# Patient Record
Sex: FEMALE | Race: WHITE | NOT HISPANIC OR LATINO | Employment: FULL TIME | URBAN - METROPOLITAN AREA
[De-identification: names, ages, dates, MRNs, and addresses within clinical notes are randomized per-mention and may not be internally consistent; named-entity substitution may affect disease eponyms.]

---

## 2017-01-16 ENCOUNTER — APPOINTMENT (OUTPATIENT)
Dept: GENERAL RADIOLOGY | Facility: CLINIC | Age: 47
End: 2017-01-16
Attending: NURSE PRACTITIONER
Payer: COMMERCIAL

## 2017-01-16 ENCOUNTER — HOSPITAL ENCOUNTER (EMERGENCY)
Facility: CLINIC | Age: 47
Discharge: HOME | End: 2017-01-16
Attending: NURSE PRACTITIONER | Admitting: NURSE PRACTITIONER
Payer: COMMERCIAL

## 2017-01-16 VITALS — DIASTOLIC BLOOD PRESSURE: 109 MMHG | SYSTOLIC BLOOD PRESSURE: 173 MMHG | TEMPERATURE: 98.7 F | OXYGEN SATURATION: 99 %

## 2017-01-16 DIAGNOSIS — J20.9 ACUTE BRONCHITIS WITH SYMPTOMS > 10 DAYS: ICD-10-CM

## 2017-01-16 PROCEDURE — 71020 XR CHEST 2 VW: CPT

## 2017-01-16 PROCEDURE — 99213 OFFICE O/P EST LOW 20 MIN: CPT | Performed by: NURSE PRACTITIONER

## 2017-01-16 PROCEDURE — 99213 OFFICE O/P EST LOW 20 MIN: CPT

## 2017-01-16 RX ORDER — PREDNISONE 20 MG/1
TABLET ORAL
Qty: 10 TABLET | Refills: 0 | Status: SHIPPED | OUTPATIENT
Start: 2017-01-16 | End: 2017-01-24

## 2017-01-16 RX ORDER — BENZONATATE 200 MG/1
200 CAPSULE ORAL 3 TIMES DAILY PRN
Qty: 21 CAPSULE | Refills: 0 | Status: SHIPPED | OUTPATIENT
Start: 2017-01-16 | End: 2017-03-24

## 2017-01-16 RX ORDER — AZITHROMYCIN 250 MG/1
TABLET, FILM COATED ORAL
Qty: 6 TABLET | Refills: 0 | Status: SHIPPED | OUTPATIENT
Start: 2017-01-16 | End: 2017-01-21

## 2017-01-16 RX ORDER — ALBUTEROL SULFATE 90 UG/1
2 AEROSOL, METERED RESPIRATORY (INHALATION) EVERY 6 HOURS PRN
Qty: 1 INHALER | Refills: 0 | Status: SHIPPED | OUTPATIENT
Start: 2017-01-16 | End: 2018-04-24

## 2017-01-16 NOTE — DISCHARGE INSTRUCTIONS
Bronchitis, Antibiotic Treatment (Adult)    Bronchitis is an infection of the air passages (bronchial tubes) in your lungs. It often occurs when you have a cold. This illness is contagious during the first few days and is spread through the air by coughing and sneezing, or by direct contact (touching the sick person and then touching your own eyes, nose, or mouth).  Symptoms of bronchitis include cough with mucus (phlegm) and low-grade fever. Bronchitis usually lasts 7 to 14 days. Mild cases can be treated with simple home remedies. More severe infection is treated with an antibiotic.  Home care  Follow these guidelines when caring for yourself at home:    If your symptoms are severe, rest at home for the first 2 to 3 days. When you go back to your usual activities, don't let yourself get too tired.    Do not smoke. Also avoid being exposed to secondhand smoke.    You may use over-the-counter medicines to control fever or pain, unless another medicine was prescribed. (Note: If you have chronic liver or kidney disease or have ever had a stomach ulcer or gastrointestinal bleeding, talk with your healthcare provider before using these medicines. Also talk to your provider if you are taking medicine to prevent blood clots.) Aspirin should never be given to anyone younger than 18 years of age who is ill with a viral infection or fever. It may cause severe liver or brain damage.    Your appetite may be poor, so a light diet is fine. Avoid dehydration by drinking 6 to 8 glasses of fluids per day (such as water, soft drinks, sports drinks, juices, tea, or soup). Extra fluids will help loosen secretions in the nose and lungs.    Over-the-counter cough, cold, and sore-throat medicines will not shorten the length of the illness, but they may be helpful to reduce symptoms. (Note: Do not use decongestants if you have high blood pressure.)    Finish all antibiotic medicine. Do this even if you are feeling better after only a  few days.  Follow-up care  Follow up with your healthcare provider, or as advised. If you had an X-ray or ECG (electrocardiogram), a specialist will review it. You will be notified of any new findings that may affect your care.  Note: If you are age 65 or older, or if you have a chronic lung disease or condition that affects your immune system, or you smoke, talk to your healthcare provider about having pneumococcal vaccinations and a yearly influenza vaccination (flu shot).  When to seek medical advice  Call your healthcare provider right away if any of these occur:    Fever of 100.4 F (38 C) or higher    Coughing up increased amounts of colored sputum    Weakness, drowsiness, headache, facial pain, ear pain, or a stiff neck   Call 911, or get immediate medical care  Contact emergency services right away if any of these occur.    Coughing up blood    Worsening weakness, drowsiness, headache, or stiff neck    Trouble breathing, wheezing, or pain with breathing    4891-4308 [employer]. [address] [place] [address], [place] [address]. All rights reserved. This information is not intended as a substitute for professional medical care. Always follow your healthcare professional's instructions.

## 2017-01-16 NOTE — ED PROVIDER NOTES
History     Chief Complaint   Patient presents with     Cough     has had for 2wks, not getting better, gettign worse today      HPI  Nilsa Vazquez is a 46 year old female with history of htn who presents to urgent care for evaluation of cough and shortness of breath. Cough is non-productive and dry. Symptoms started 2 weeks ago.  No improvement. She has been trying OTC medications. She is a non-smoker.   Patient Active Problem List   Diagnosis     GLOSSOPHAR NERVE DYSFUNCTION     Essential hypertension, benign     Persistent disorder of initiating or maintaining sleep     CARDIOVASCULAR SCREENING; LDL GOAL LESS THAN 160       No current facility-administered medications on file prior to encounter.  Current Outpatient Prescriptions on File Prior to Encounter:  lisinopril (PRINIVIL,ZESTRIL) 20 MG tablet Take 1 tablet (20 mg) by mouth daily   zolpidem (AMBIEN) 5 MG tablet Take 1 tablet (5 mg) by mouth nightly as needed for sleep at bedtime.   diphenhydrAMINE (BENADRYL) 25 MG capsule Take 25 mg by mouth every 6 hours as needed for itching or allergies   IBUPROFEN 200 MG OR TABS 2 tablets twice daily for back pain         I have reviewed the Medications, Allergies, Past Medical and Surgical History, and Social History in the Epic system.    Review of Systems  As mentioned above in the history present illness. All other systems were reviewed and are negative.    Physical Exam   BP: (!) 173/109 mmHg  Heart Rate: 74  Temp: 98.7  F (37.1  C)  SpO2: 99 %  Physical Exam    GENERAL APPEARANCE: healthy, alert and no distress  EYES: EOMI,  PERRL, conjunctiva clear  HENT: ear canals and TM's normal.  Nose and mouth without ulcers, erythema or lesions  NECK: supple, nontender, no lymphadenopathy  RESP: expiratory wheezing on the right, clear on left - no rales, rhonchi or wheezes. Frequent cough noted.   CV: regular rates and rhythm, normal S1 S2, no murmur noted    ED Course   Procedures           Results for orders placed  or performed during the hospital encounter of 01/16/17 (from the past 24 hour(s))   XR Chest 2 Views    Narrative    CHEST TWO VIEWS  1/16/2017 5:43 PM     HISTORY: Cough.    COMPARISON: 3/19/2007      Impression    IMPRESSION: Normal. No change.    TONA CONDON MD       Labs Ordered and Resulted from Time of ED Arrival Up to the Time of Departure from the ED - No data to display    Assessments & Plan (with Medical Decision Making)   DDx:  bronchitis, pneumonia, strep pharyngitis, viral pharyngitis, URI, influenza, sinusitis, otitis media      Nilsa Vazquez is a 46 year old female with history of htn who presents to urgent care for evaluation of cough and shortness of breath. Cough is non-productive and dry. Symptoms started 2 weeks ago.  No improvement. She has been trying OTC medications. She is a non-smoker. Elevated BP-taking meds as prescribed for htn. On exam she has expiratory wheezing on right. Chest xray negative for pneumonia. Patient likely has an acute bronchitis. She will be initiated on antibiotics and steroids. Prescription for prednisone, z-stephenie, albuterol and Tessalon sent to pharmacy.  Worrisome reasons to return discussed.    I have reviewed the nursing notes.    I have reviewed the findings, diagnosis, plan and need for follow up with the patient.    Discharge Medication List as of 1/16/2017  5:56 PM      START taking these medications    Details   predniSONE (DELTASONE) 20 MG tablet Take two tablets (= 40mg) each day for 5 (five) days, Disp-10 tablet, R-0, E-Prescribe      albuterol (PROAIR HFA/PROVENTIL HFA/VENTOLIN HFA) 108 (90 BASE) MCG/ACT Inhaler Inhale 2 puffs into the lungs every 6 hours as needed for shortness of breath / dyspnea or wheezing, Disp-1 Inhaler, R-0, E-Prescribe      azithromycin (ZITHROMAX Z-STEPHENIE) 250 MG tablet Two tablets on the first day, then one tablet daily for the next 4 days, Disp-6 tablet, R-0, E-Prescribe      benzonatate (TESSALON) 200 MG capsule Take 1  capsule (200 mg) by mouth 3 times daily as needed for cough, Disp-21 capsule, R-0, E-Prescribe             Final diagnoses:   Acute bronchitis with symptoms > 10 days       1/16/2017   [facility] EMERGENCY DEPARTMENT      Ivy Gifford APRN CNP  01/16/17 [id number]

## 2017-01-16 NOTE — ED AVS SNAPSHOT
[facility] Emergency Department    [address] [address]    Phone:  [phone]    Fax:  [phone]                                       Nilsa Vazquez   MRN: [id number]    Department:  [facility] Emergency Department   Date of Visit:  1/16/2017           Patient Information     Date Of Birth          1970        Your diagnoses for this visit were:     Acute bronchitis with symptoms > 10 days        You were seen by Ivy Gifford APRN CNP.      Follow-up Information     Follow up with Claudia Mitchell MD.    Specialty:  Family Practice    Why:  As needed    Contact information:    [facility]  [address] [facility] [address]  [phone]          Discharge Instructions         Bronchitis, Antibiotic Treatment (Adult)    Bronchitis is an infection of the air passages (bronchial tubes) in your lungs. It often occurs when you have a cold. This illness is contagious during the first few days and is spread through the air by coughing and sneezing, or by direct contact (touching the sick person and then touching your own eyes, nose, or mouth).  Symptoms of bronchitis include cough with mucus (phlegm) and low-grade fever. Bronchitis usually lasts 7 to 14 days. Mild cases can be treated with simple home remedies. More severe infection is treated with an antibiotic.  Home care  Follow these guidelines when caring for yourself at home:    If your symptoms are severe, rest at home for the first 2 to 3 days. When you go back to your usual activities, don't let yourself get too tired.    Do not smoke. Also avoid being exposed to secondhand smoke.    You may use over-the-counter medicines to control fever or pain, unless another medicine was prescribed. (Note: If you have chronic liver or kidney disease or have ever had a stomach ulcer or gastrointestinal bleeding, talk with your healthcare provider before using these medicines. Also talk to your  provider if you are taking medicine to prevent blood clots.) Aspirin should never be given to anyone younger than 18 years of age who is ill with a viral infection or fever. It may cause severe liver or brain damage.    Your appetite may be poor, so a light diet is fine. Avoid dehydration by drinking 6 to 8 glasses of fluids per day (such as water, soft drinks, sports drinks, juices, tea, or soup). Extra fluids will help loosen secretions in the nose and lungs.    Over-the-counter cough, cold, and sore-throat medicines will not shorten the length of the illness, but they may be helpful to reduce symptoms. (Note: Do not use decongestants if you have high blood pressure.)    Finish all antibiotic medicine. Do this even if you are feeling better after only a few days.  Follow-up care  Follow up with your healthcare provider, or as advised. If you had an X-ray or ECG (electrocardiogram), a specialist will review it. You will be notified of any new findings that may affect your care.  Note: If you are age 65 or older, or if you have a chronic lung disease or condition that affects your immune system, or you smoke, talk to your healthcare provider about having pneumococcal vaccinations and a yearly influenza vaccination (flu shot).  When to seek medical advice  Call your healthcare provider right away if any of these occur:    Fever of 100.4 F (38 C) or higher    Coughing up increased amounts of colored sputum    Weakness, drowsiness, headache, facial pain, ear pain, or a stiff neck   Call 911, or get immediate medical care  Contact emergency services right away if any of these occur.    Coughing up blood    Worsening weakness, drowsiness, headache, or stiff neck    Trouble breathing, wheezing, or pain with breathing    0265-5711 [employer]. [address] [place] [address], [place] [address]. All rights reserved. This information is not intended as a substitute for professional medical care. Always follow your  healthcare professional's instructions.          24 Hour Appointment Hotline       To make an appointment at any [facility] ([facility]3], call [phone] ([phone]). If you don't have a family doctor or clinic, we will help you find one. [facility] are conveniently located to serve the needs of you and your family.             Review of your medicines      START taking        Dose / Directions Last dose taken    albuterol 108 (90 BASE) MCG/ACT Inhaler   Commonly known as:  PROAIR HFA/PROVENTIL HFA/VENTOLIN HFA   Dose:  2 puff   Quantity:  1 Inhaler        Inhale 2 puffs into the lungs every 6 hours as needed for shortness of breath / dyspnea or wheezing   Refills:  0        azithromycin 250 MG tablet   Commonly known as:  ZITHROMAX Z-SONJA   Quantity:  6 tablet        Two tablets on the first day, then one tablet daily for the next 4 days   Refills:  0        benzonatate 200 MG capsule   Commonly known as:  TESSALON   Dose:  200 mg   Quantity:  21 capsule        Take 1 capsule (200 mg) by mouth 3 times daily as needed for cough   Refills:  0        predniSONE 20 MG tablet   Commonly known as:  DELTASONE   Quantity:  10 tablet        Take two tablets (= 40mg) each day for 5 (five) days   Refills:  0          Our records show that you are taking the medicines listed below. If these are incorrect, please call your family doctor or clinic.        Dose / Directions Last dose taken    BENADRYL 25 MG capsule   Dose:  25 mg   Generic drug:  diphenhydrAMINE        Take 25 mg by mouth every 6 hours as needed for itching or allergies   Refills:  0        ibuprofen 200 MG tablet   Commonly known as:  ADVIL/MOTRIN        2 tablets twice daily for back pain   Refills:  0        lisinopril 20 MG tablet   Commonly known as:  PRINIVIL/ZESTRIL   Dose:  20 mg   Quantity:  30 tablet        Take 1 tablet (20 mg) by mouth daily   Refills:  5        zolpidem 5 MG tablet   Commonly known as:  AMBIEN   Dose:  5 mg   Quantity:  30  "tablet        Take 1 tablet (5 mg) by mouth nightly as needed for sleep at bedtime.   Refills:  5                Prescriptions were sent or printed at these locations (4 Prescriptions)                   [facility] - [place], [place] - [address]   [address] [facility] [address]    Telephone:  [phone]   Fax:  [phone]   Hours:                  E-Prescribed (4 of 4)         predniSONE (DELTASONE) 20 MG tablet               albuterol (PROAIR HFA/PROVENTIL HFA/VENTOLIN HFA) 108 (90 BASE) MCG/ACT Inhaler               azithromycin (ZITHROMAX Z-SONJA) 250 MG tablet               benzonatate (TESSALON) 200 MG capsule                Procedures and tests performed during your visit     XR Chest 2 Views      Orders Needing Specimen Collection     None      Pending Results     No orders found from 1/15/2017 to 1/17/2017.            Pending Culture Results     No orders found from 1/15/2017 to 1/17/2017.       Test Results from your hospital stay           1/16/2017  5:48 PM - Interface, Radiant Ib      Narrative     CHEST TWO VIEWS  1/16/2017 5:43 PM     HISTORY: Cough.    COMPARISON: 3/19/2007        Impression     IMPRESSION: Normal. No change.    TONA CONDON MD                Thank you for choosing [facility]       Thank you for choosing [facility] for your care. Our goal is always to provide you with excellent care. Hearing back from our patients is one way we can continue to improve our services. Please take a few minutes to complete the written survey that you may receive in the mail after you visit with us. Thank you!        [employer] Information     [employer] lets you send messages to your doctor, view your test results, renew your prescriptions, schedule appointments and more. To sign up, go to [email or web address] . Click on \"Log in\" on the left side of the screen, which will take you to the Welcome page. Then click on \"Sign up Now\" on the right side of the page.     You will be asked " to enter the access code listed below, as well as some personal information. Please follow the directions to create your username and password.     Your access code is: [email or web address]  Expires: 2017  5:56 PM     Your access code will  in 90 days. If you need help or a new code, please call your [facility] clinic or [phone].        Care EveryWhere ID     This is your Care EveryWhere ID. This could be used by other organizations to access your [facility] medical records  [id number]        After Visit Summary       This is your record. Keep this with you and show to your community pharmacist(s) and doctor(s) at your next visit.

## 2017-01-16 NOTE — ED AVS SNAPSHOT
[facility] Emergency Department    [address] [facility] [address]    Phone:  [phone]    Fax:  [phone]                                       Nilsa Vazquez   MRN: [id number]    Department:  [facility] Emergency Department   Date of Visit:  1/16/2017           After Visit Summary Signature Page     I have received my discharge instructions, and my questions have been answered. I have discussed any challenges I see with this plan with the nurse or doctor.    ..........................................................................................................................................  Patient/Patient Representative Signature      ..........................................................................................................................................  Patient Representative Print Name and Relationship to Patient    ..................................................               ................................................  Date                                            Time    ..........................................................................................................................................  Reviewed by Signature/Title    ...................................................              ..............................................  Date                                                            Time

## 2017-01-24 ENCOUNTER — OFFICE VISIT (OUTPATIENT)
Dept: FAMILY MEDICINE | Facility: CLINIC | Age: 47
End: 2017-01-24
Payer: COMMERCIAL

## 2017-01-24 VITALS
HEART RATE: 100 BPM | SYSTOLIC BLOOD PRESSURE: 164 MMHG | BODY MASS INDEX: 38.41 KG/M2 | HEIGHT: 66 IN | DIASTOLIC BLOOD PRESSURE: 103 MMHG | OXYGEN SATURATION: 98 % | TEMPERATURE: 98.5 F | WEIGHT: 239 LBS

## 2017-01-24 DIAGNOSIS — J20.9 ACUTE BRONCHITIS WITH SYMPTOMS > 10 DAYS: Primary | ICD-10-CM

## 2017-01-24 PROCEDURE — 99213 OFFICE O/P EST LOW 20 MIN: CPT | Performed by: PHYSICIAN ASSISTANT

## 2017-01-24 RX ORDER — CODEINE PHOSPHATE AND GUAIFENESIN 10; 100 MG/5ML; MG/5ML
1-2 SOLUTION ORAL EVERY 4 HOURS PRN
Qty: 180 ML | Refills: 0 | Status: SHIPPED | OUTPATIENT
Start: 2017-01-24 | End: 2017-03-24

## 2017-01-24 ASSESSMENT — ENCOUNTER SYMPTOMS
BLOOD IN STOOL: 0
NAUSEA: 0
NEUROLOGICAL NEGATIVE: 1
NERVOUS/ANXIOUS: 0
DOUBLE VISION: 0
DEPRESSION: 0
PALPITATIONS: 0
SEIZURES: 0
COUGH: 1
DIARRHEA: 0
HALLUCINATIONS: 0
DIZZINESS: 0
SHORTNESS OF BREATH: 0
PHOTOPHOBIA: 0
NECK PAIN: 0
BACK PAIN: 0
WHEEZING: 0
LOSS OF CONSCIOUSNESS: 0
FOCAL WEAKNESS: 0
FEVER: 0
MYALGIAS: 0
SPUTUM PRODUCTION: 0
ORTHOPNEA: 0
HEMOPTYSIS: 0
SENSORY CHANGE: 0
INSOMNIA: 0
EYE PAIN: 0
WEAKNESS: 0
EYE REDNESS: 0
TINGLING: 0
ABDOMINAL PAIN: 0
DIAPHORESIS: 0
CONSTIPATION: 0
VOMITING: 0
DYSURIA: 0
WEIGHT LOSS: 0
SORE THROAT: 0
HEADACHES: 0
FREQUENCY: 0
BLURRED VISION: 0
HEARTBURN: 0
EYE DISCHARGE: 0

## 2017-01-24 ASSESSMENT — LIFESTYLE VARIABLES: SUBSTANCE_ABUSE: 0

## 2017-01-24 NOTE — NURSING NOTE
"Chief Complaint   Patient presents with     URI       Initial /103 mmHg  Pulse 100  Temp(Src) 98.5  F (36.9  C) (Tympanic)  Ht 5' 5.5\" (1.664 m)  Wt 239 lb (108.41 kg)  BMI 39.15 kg/m2  SpO2 98% Estimated body mass index is 39.15 kg/(m^2) as calculated from the following:    Height as of this encounter: 5' 5.5\" (1.664 m).    Weight as of this encounter: 239 lb (108.41 kg).  BP completed using cuff size: leila BENNETT MA    "

## 2017-01-24 NOTE — PROGRESS NOTES
HPI    SUBJECTIVE:                                                    Nilsa Vazquez is a 46 year old female who presents to clinic today for follow-up after being fitted for a bronchitis last week with Zithromax, prednisone, Tessalon Perles and an inhaler. She states her symptoms did not improve and her cough persists. It has been a total of 3+ weeks since her cough started. She has not had a fever.      ED/UC Followup:    Facility:  [facility]  Date of visit: 1/16/2017  Reason for visit: Acute bronchitis with symptoms > 10 days   Current Status: Was given an antibiotic and prednisone.  Didn't help  Is still coughing, inhaler doesn't help.  Cough seems to only happen on exhale.         Problem list and histories reviewed & adjusted, as indicated.  Additional history: as documented  *    Insulin. Right nowThis note consists of symbols derived from keyboarding, and/or voice recognition software. As a result, there may be errors in the diction that have gone undetected. Please consider this when interpreting information found in this chart.    Patient Active Problem List   Diagnosis     GLOSSOPHAR NERVE DYSFUNCTION     Essential hypertension, benign     Persistent disorder of initiating or maintaining sleep     CARDIOVASCULAR SCREENING; LDL GOAL LESS THAN 160     Past Surgical History   Procedure Laterality Date     Surgical history of -   1992 approx     Bilateral feet repair, with pins in place       Social History   Substance Use Topics     Smoking status: Former Smoker -- 1.00 packs/day for 25 years     Types: Cigarettes     Quit date: 08/01/2005     Smokeless tobacco: Never Used     Alcohol Use: Yes      Comment: occ     Family History   Problem Relation Age of Onset     DIABETES Mother      type 2     Hypertension Mother      Heart Failure Mother 75     DIABETES Father      type 2     Hypertension Father      Genitourinary Problems Father      kidney stones     CANCER Father 75     bladder cancer     Breast Cancer  Maternal Grandmother      DIABETES Maternal Grandmother      Hypertension Maternal Grandmother      CEREBROVASCULAR DISEASE Maternal Grandmother      Hypertension Brother      Blood Disease Brother      blood clot in leg         Current Outpatient Prescriptions   Medication Sig Dispense Refill     amoxicillin-clavulanate (AUGMENTIN) 875-125 MG per tablet Take 1 tablet by mouth 2 times daily 20 tablet 0     guaiFENesin-codeine (ROBITUSSIN AC) 100-10 MG/5ML SOLN solution Take 5-10 mLs by mouth every 4 hours as needed for cough 180 mL 0     albuterol (PROAIR HFA/PROVENTIL HFA/VENTOLIN HFA) 108 (90 BASE) MCG/ACT Inhaler Inhale 2 puffs into the lungs every 6 hours as needed for shortness of breath / dyspnea or wheezing 1 Inhaler 0     benzonatate (TESSALON) 200 MG capsule Take 1 capsule (200 mg) by mouth 3 times daily as needed for cough 21 capsule 0     lisinopril (PRINIVIL,ZESTRIL) 20 MG tablet Take 1 tablet (20 mg) by mouth daily 30 tablet 5     zolpidem (AMBIEN) 5 MG tablet Take 1 tablet (5 mg) by mouth nightly as needed for sleep at bedtime. 30 tablet 5     diphenhydrAMINE (BENADRYL) 25 MG capsule Take 25 mg by mouth every 6 hours as needed for itching or allergies       IBUPROFEN 200 MG OR TABS 2 tablets twice daily for back pain       No Known Allergies  Problem list, Medication list, Allergies, and Medical/Social/Surgical histories reviewed in [facility] and updated as appropriate.          Review of Systems   Constitutional: Negative for fever, weight loss, malaise/fatigue and diaphoresis.   HENT: Negative for congestion, ear discharge, ear pain, hearing loss, nosebleeds and sore throat.    Eyes: Negative for blurred vision, double vision, photophobia, pain, discharge and redness.   Respiratory: Positive for cough. Negative for hemoptysis, sputum production, shortness of breath and wheezing.    Cardiovascular: Negative for chest pain, palpitations, orthopnea and leg swelling.   Gastrointestinal: Negative for  heartburn, nausea, vomiting, abdominal pain, diarrhea, constipation, blood in stool and melena.   Genitourinary: Negative.  Negative for dysuria, urgency and frequency.   Musculoskeletal: Negative for myalgias, back pain, joint pain and neck pain.   Skin: Negative for itching and rash.   Neurological: Negative.  Negative for dizziness, tingling, sensory change, focal weakness, seizures, loss of consciousness, weakness and headaches.   Endo/Heme/Allergies: Negative.    Psychiatric/Behavioral: Negative for depression, suicidal ideas, hallucinations and substance abuse. The patient is not nervous/anxious and does not have insomnia.          Physical Exam   Constitutional: She is oriented to person, place, and time and well-developed, well-nourished, and in no distress. No distress.   HENT:   Head: Normocephalic and atraumatic.   Right Ear: External ear normal.   Left Ear: External ear normal.   Nose: Nose normal.   Eyes: Conjunctivae and EOM are normal. Pupils are equal, round, and reactive to light. Right eye exhibits no discharge. Left eye exhibits no discharge. No scleral icterus.   Neck: Normal range of motion. Neck supple. No JVD present. No tracheal deviation present. No thyromegaly present.   Cardiovascular: Normal rate, regular rhythm, normal heart sounds and intact distal pulses.  Exam reveals no gallop and no friction rub.    No murmur heard.  Pulmonary/Chest: Effort normal. No stridor. No respiratory distress. She has wheezes. She has no rales. She exhibits no tenderness.   Abdominal: Soft. Bowel sounds are normal. She exhibits no distension and no mass. There is no tenderness. There is no rebound and no guarding.   Musculoskeletal: Normal range of motion. She exhibits no edema or tenderness.   Lymphadenopathy:     She has no cervical adenopathy.   Neurological: She is alert and oriented to person, place, and time. She has normal reflexes. No cranial nerve deficit. She exhibits normal muscle tone. Gait  normal.   Skin: Skin is warm and dry. No rash noted. She is not diaphoretic. No erythema. No pallor.   Psychiatric: Mood, memory, affect and judgment normal.         (J20.9) Acute bronchitis with symptoms > 10 days  (primary encounter diagnosis)  Comment:   Plan: amoxicillin-clavulanate (AUGMENTIN) 875-125 MG         per tablet, guaiFENesin-codeine (ROBITUSSIN AC)        100-10 MG/5ML SOLN solution         Follow-up if this does not resolve symptoms.

## 2017-02-01 DIAGNOSIS — I10 ESSENTIAL HYPERTENSION, BENIGN: Primary | ICD-10-CM

## 2017-02-02 ENCOUNTER — RADIANT APPOINTMENT (OUTPATIENT)
Dept: GENERAL RADIOLOGY | Facility: CLINIC | Age: 47
End: 2017-02-02
Attending: NURSE PRACTITIONER
Payer: COMMERCIAL

## 2017-02-02 ENCOUNTER — OFFICE VISIT (OUTPATIENT)
Dept: FAMILY MEDICINE | Facility: CLINIC | Age: 47
End: 2017-02-02
Payer: COMMERCIAL

## 2017-02-02 VITALS
HEIGHT: 66 IN | BODY MASS INDEX: 38.41 KG/M2 | OXYGEN SATURATION: 98 % | WEIGHT: 239 LBS | RESPIRATION RATE: 16 BRPM | TEMPERATURE: 98.6 F | HEART RATE: 107 BPM | SYSTOLIC BLOOD PRESSURE: 158 MMHG | DIASTOLIC BLOOD PRESSURE: 82 MMHG

## 2017-02-02 DIAGNOSIS — R05.9 COUGH: ICD-10-CM

## 2017-02-02 DIAGNOSIS — R06.00 DYSPNEA, UNSPECIFIED TYPE: ICD-10-CM

## 2017-02-02 DIAGNOSIS — R05.3 CHRONIC COUGH: Primary | ICD-10-CM

## 2017-02-02 DIAGNOSIS — N89.8 VAGINAL ITCHING: ICD-10-CM

## 2017-02-02 PROCEDURE — 99213 OFFICE O/P EST LOW 20 MIN: CPT | Performed by: NURSE PRACTITIONER

## 2017-02-02 PROCEDURE — 71020 XR CHEST 2 VW: CPT

## 2017-02-02 RX ORDER — FLUTICASONE PROPIONATE 50 MCG
1-2 SPRAY, SUSPENSION (ML) NASAL DAILY
Qty: 1 BOTTLE | Refills: 11 | Status: SHIPPED | OUTPATIENT
Start: 2017-02-02 | End: 2018-04-24

## 2017-02-02 RX ORDER — FLUCONAZOLE 150 MG/1
150 TABLET ORAL ONCE
Qty: 1 TABLET | Refills: 0 | Status: SHIPPED | OUTPATIENT
Start: 2017-02-02 | End: 2017-02-02

## 2017-02-02 RX ORDER — LISINOPRIL 20 MG/1
20 TABLET ORAL DAILY
Qty: 30 TABLET | Refills: 0 | Status: SHIPPED | OUTPATIENT
Start: 2017-02-02 | End: 2017-03-06

## 2017-02-02 RX ORDER — FLUTICASONE PROPIONATE 220 UG/1
2 AEROSOL, METERED RESPIRATORY (INHALATION) 2 TIMES DAILY
Qty: 1 INHALER | Refills: 1 | Status: SHIPPED | OUTPATIENT
Start: 2017-02-02 | End: 2018-04-24

## 2017-02-02 NOTE — NURSING NOTE
"Chief Complaint   Patient presents with     URI       Initial /82 mmHg  Pulse 107  Temp(Src) 98.6  F (37  C) (Tympanic)  Resp 16  Ht 5' 5.5\" (1.664 m)  Wt 239 lb (108.41 kg)  BMI 39.15 kg/m2  SpO2 98% Estimated body mass index is 39.15 kg/(m^2) as calculated from the following:    Height as of this encounter: 5' 5.5\" (1.664 m).    Weight as of this encounter: 239 lb (108.41 kg).  BP completed using cuff size: large  "

## 2017-02-02 NOTE — TELEPHONE ENCOUNTER
lisinopril (PRINIVIL,ZESTRIL) 20 MG tablet      Last Written Prescription Date: 8/1/16  Last Fill Quantity: 30, # refills: 5  Last Office Visit with G, P or [facility] prescribing provider: 1/24/17(ANDRIA)       POTASSIUM   Date Value Ref Range Status   01/19/2016 4.2 3.4 - 5.3 mmol/L Final     CREATININE   Date Value Ref Range Status   01/19/2016 0.66 0.52 - 1.04 mg/dL Final     BP Readings from Last 3 Encounters:   01/24/17 164/103   01/16/17 173/109   01/19/16 132/80

## 2017-02-02 NOTE — PROGRESS NOTES
SUBJECTIVE:                                                    Nilsa Vazquez is a 46 year old female who presents to clinic today for the following health issues:      Acute Illness   Acute illness concerns: cough  Onset: 1 month     Fever: no     Chills/Sweats: YES- both- Sweats with coughing attacks and then chills afterward.     Headache (location?): no     Sinus Pressure:no    Conjunctivitis:  no    Ear Pain: no    Rhinorrhea: no     Congestion: no    Sore Throat: no     On Lisinopril, has been for years, no recent change in dose. Denies GERD/heartburn symptoms, no acid taste in mouth. Sx are not worse at night or with lying down.      Cough: YES-non-productive, shortness of breath, lightheadedness, after coughing feels like she can't breath, cough is worse with exertion.    Wheeze: no     Decreased Appetite: YES    Nausea: no     Vomiting: no     Diarrhea:  no     Dysuria/Freq.: YES- is having vaginal symptoms she believes to be due to the antibiotic she is currently taking. Having some itching and thick discharge, also on period right now.     Fatigue/Achiness: YES- fatigue    Sick/Strep Exposure: no     Therapies Tried and outcome: augmentin, tried a zpack, prednisone, inhaler, tessalon capsules-nothing seems to be working. Menthol cough drops suppress the tickle in her throat mildly.    Problem list and histories reviewed & adjusted, as indicated.  Additional history: as documented    Patient Active Problem List   Diagnosis     GLOSSOPHAR NERVE DYSFUNCTION     Essential hypertension, benign     Persistent disorder of initiating or maintaining sleep     CARDIOVASCULAR SCREENING; LDL GOAL LESS THAN 160     Past Surgical History   Procedure Laterality Date     Surgical history of -   1992 approx     Bilateral feet repair, with pins in place       Social History   Substance Use Topics     Smoking status: Former Smoker -- 1.00 packs/day for 25 years     Types: Cigarettes     Quit date: 08/01/2005      "Smokeless tobacco: Never Used     Alcohol Use: Yes      Comment: occ     Family History   Problem Relation Age of Onset     DIABETES Mother      type 2     Hypertension Mother      Heart Failure Mother 75     DIABETES Father      type 2     Hypertension Father      Genitourinary Problems Father      kidney stones     CANCER Father 75     bladder cancer     Breast Cancer Maternal Grandmother      DIABETES Maternal Grandmother      Hypertension Maternal Grandmother      CEREBROVASCULAR DISEASE Maternal Grandmother      Hypertension Brother      Blood Disease Brother      blood clot in leg           ROS:  Constitutional, HEENT, cardiovascular, pulmonary, gi and gu systems are negative, except as otherwise noted.    OBJECTIVE:                                                    /82 mmHg  Pulse 107  Temp(Src) 98.6  F (37  C) (Tympanic)  Resp 16  Ht 5' 5.5\" (1.664 m)  Wt 239 lb (108.41 kg)  BMI 39.15 kg/m2  SpO2 98%  Body mass index is 39.15 kg/(m^2).  GENERAL: alert, no distress and diaphoretic  EYES: Eyes grossly normal to inspection, PERRL and conjunctivae and sclerae normal  HENT: normal cephalic/atraumatic, ear canals and TM's normal, nose and mouth without ulcers or lesions, nasal mucosa edematous , rhinorrhea clear, oropharynx clear and oral mucous membranes moist, sinuses non-tender.  NECK: no adenopathy, no asymmetry, masses, or scars and thyroid normal to palpation  RESP: lungs clear to auscultation - no rales, rhonchi or wheezes  CV: regular rates and rhythm, normal S1 S2, no S3 or S4 and no murmur, click or rub  SKIN: no suspicious lesions or rashes  NEURO: Normal strength and tone, mentation intact and speech normal    Diagnostic Test Results:  CXR - unremarkable     ASSESSMENT/PLAN:                                                        ICD-10-CM    1. Chronic cough R05 XR Chest 2 Views     fluticasone (FLOVENT HFA) 220 MCG/ACT Inhaler     fluticasone (FLONASE) 50 MCG/ACT spray   2. Dyspnea, " unspecified type R06.00 fluticasone (FLOVENT HFA) 220 MCG/ACT Inhaler   3. Vaginal itching L29.8 fluconazole (DIFLUCAN) 150 MG tablet     Differential Dx: Asthma, Post nasal drainage, Allergies, tracheal irritation - hx of tumor affecting the glossopharyngeal nerve, was told at that time she also tested positive for scleroderma. Encouraged her to f/u with ENT as she is supposed to see them annually and is long over due.       Patient Instructions     Cough, Chronic, Uncertain Cause (Adult)    Everyone has had a cough as part of the common cold, flu, or bronchitis. This kind of cough occurs along with an achy feeling, low-grade fever, nasal and sinus congestion, and a scratchy or sore throat. This usually gets better in 2 to 3 weeks. A cough that lasts longer than 3 weeks may be due to other causes.  If your cough does not improve over the next 2 weeks, further testing may be needed. Follow up with your healthcare provider as advised. Cough suppressants may be recommended. Based on your exam today, the exact cause of your cough is not certain. Below are some common causes for persistent cough.  Smokers cough   Smoker s cough  doesn t go away. If you continue to smoke, it only gets worse. The cough is from irritation in the air passages. Talk to your healthcare provider about quitting. Medicines or nicotine-replacement products, like gum or the patch, may make quitting easier.  Postnasal drip  A cough that is worse at night may be due to postnasal drip. Excess mucus in the nose drains from the back of your nose to your throat. This triggers the cough reflex. Postnasal drip may be due to a sinus infection or allergy. Common allergens include dust, tobacco smoke (both inhaled and secondhand smoke), environmental pollutants, pollen, mold, pets, cleaning agents, room deodorizers, and chemical fumes. Over-the-counter antihistamines or decongestants may be helpful for allergies. A sinus infection may requires antibiotic  treatment. See your healthcare provider if symptoms continue.  Medicines  Certain prescribed medicines can cause a chronic cough in some people:    ACE inhibitors for high blood pressure. These include benazepril, captopril, enalapril, fosinopril, lisinopril, quinapril, ramipril, and others.    Beta-blockers for high blood pressure and other conditions. These include propranolol, atenolol, metoprolol, nadolol, and others.  Let your healthcare provider know if you are taking any of these.  Asthma  Cough may be the only sign of mild asthma. You may have tests to find out if asthma is causing your cough. You may also take asthma medicine on a trial basis.  Acid reflux (heartburn, GERD)   The esophagus is the tube that carries food from the mouth to the stomach. A valve at its lower end prevents stomach acids from flowing upward. If this valve does not work properly, acid from the stomach enters the esophagus. This may cause a burning pain in the upper abdomen or lower chest, belching, or cough. Symptoms are often worse when lying flat. Avoid eating or drinking before bedtime. Try using extra pillows to raise your upper body, or place 4-inch blocks under the head of your bed. You may try an over-the-counter antacid or an acid-blocking medicine such as famotidine, cimetidine, ranitidine, esomeprazole, lansoprazole, or omeprazole. Stronger medicines for this condition can be prescribed by your healthcare provider.  Follow-up care  Follow up with your healthcare provider, or as advised, if your cough does not improve. Further testing may be needed.  (Note: If an X-ray was taken, a specialist will review it. You will be notified of any new findings that may affect your care.)  When to seek medical advice  Call your healthcare provider right away if any of these occur:    Mild wheezing or difficulty breathing    Fever of 100.4 F (38 C) or higher, or as directed by your healthcare provider    Unexpected weight  loss    Coughing up large amounts of colored sputum    Night sweats (sheets and pajamas get soaking wet)  Call 911, or get immediate medical care  Contact emergency services right away if any of these occur:    Coughing up blood    Moderate to severe trouble breathing or wheezing    [id number] [employer]. [address] [place] [id number]. All rights reserved. This information is not intended as a substitute for professional medical care. Always follow your healthcare professional's instructions.              DIMAS Chen [facility]

## 2017-02-02 NOTE — PATIENT INSTRUCTIONS
Cough, Chronic, Uncertain Cause (Adult)    Everyone has had a cough as part of the common cold, flu, or bronchitis. This kind of cough occurs along with an achy feeling, low-grade fever, nasal and sinus congestion, and a scratchy or sore throat. This usually gets better in 2 to 3 weeks. A cough that lasts longer than 3 weeks may be due to other causes.  If your cough does not improve over the next 2 weeks, further testing may be needed. Follow up with your healthcare provider as advised. Cough suppressants may be recommended. Based on your exam today, the exact cause of your cough is not certain. Below are some common causes for persistent cough.  Smokers cough   Smoker s cough  doesn t go away. If you continue to smoke, it only gets worse. The cough is from irritation in the air passages. Talk to your healthcare provider about quitting. Medicines or nicotine-replacement products, like gum or the patch, may make quitting easier.  Postnasal drip  A cough that is worse at night may be due to postnasal drip. Excess mucus in the nose drains from the back of your nose to your throat. This triggers the cough reflex. Postnasal drip may be due to a sinus infection or allergy. Common allergens include dust, tobacco smoke (both inhaled and secondhand smoke), environmental pollutants, pollen, mold, pets, cleaning agents, room deodorizers, and chemical fumes. Over-the-counter antihistamines or decongestants may be helpful for allergies. A sinus infection may requires antibiotic treatment. See your healthcare provider if symptoms continue.  Medicines  Certain prescribed medicines can cause a chronic cough in some people:    ACE inhibitors for high blood pressure. These include benazepril, captopril, enalapril, fosinopril, lisinopril, quinapril, ramipril, and others.    Beta-blockers for high blood pressure and other conditions. These include propranolol, atenolol, metoprolol, nadolol, and others.  Let your healthcare provider  know if you are taking any of these.  Asthma  Cough may be the only sign of mild asthma. You may have tests to find out if asthma is causing your cough. You may also take asthma medicine on a trial basis.  Acid reflux (heartburn, GERD)   The esophagus is the tube that carries food from the mouth to the stomach. A valve at its lower end prevents stomach acids from flowing upward. If this valve does not work properly, acid from the stomach enters the esophagus. This may cause a burning pain in the upper abdomen or lower chest, belching, or cough. Symptoms are often worse when lying flat. Avoid eating or drinking before bedtime. Try using extra pillows to raise your upper body, or place 4-inch blocks under the head of your bed. You may try an over-the-counter antacid or an acid-blocking medicine such as famotidine, cimetidine, ranitidine, esomeprazole, lansoprazole, or omeprazole. Stronger medicines for this condition can be prescribed by your healthcare provider.  Follow-up care  Follow up with your healthcare provider, or as advised, if your cough does not improve. Further testing may be needed.  (Note: If an X-ray was taken, a specialist will review it. You will be notified of any new findings that may affect your care.)  When to seek medical advice  Call your healthcare provider right away if any of these occur:    Mild wheezing or difficulty breathing    Fever of 100.4 F (38 C) or higher, or as directed by your healthcare provider    Unexpected weight loss    Coughing up large amounts of colored sputum    Night sweats (sheets and pajamas get soaking wet)  Call 911, or get immediate medical care  Contact emergency services right away if any of these occur:    Coughing up blood    Moderate to severe trouble breathing or wheezing    [phone] [employer]. [address] [place] [address], [place] [address]. All rights reserved. This information is not intended as a substitute for professional medical care. Always  follow your healthcare professional's instructions.

## 2017-02-02 NOTE — MR AVS SNAPSHOT
After Visit Summary   2/2/2017    Nilsa Vazquez    MRN: [id number]           Patient Information     Date Of Birth          1970        Visit Information        Provider Department      2/2/2017 11:00 AM Meme Moreno APRN CNP [facility]        Today's Diagnoses     Cough    -  1     Dyspnea, unspecified type         Vaginal itching           Care Instructions      Cough, Chronic, Uncertain Cause (Adult)    Everyone has had a cough as part of the common cold, flu, or bronchitis. This kind of cough occurs along with an achy feeling, low-grade fever, nasal and sinus congestion, and a scratchy or sore throat. This usually gets better in 2 to 3 weeks. A cough that lasts longer than 3 weeks may be due to other causes.  If your cough does not improve over the next 2 weeks, further testing may be needed. Follow up with your healthcare provider as advised. Cough suppressants may be recommended. Based on your exam today, the exact cause of your cough is not certain. Below are some common causes for persistent cough.  Smokers cough   Smoker s cough  doesn t go away. If you continue to smoke, it only gets worse. The cough is from irritation in the air passages. Talk to your healthcare provider about quitting. Medicines or nicotine-replacement products, like gum or the patch, may make quitting easier.  Postnasal drip  A cough that is worse at night may be due to postnasal drip. Excess mucus in the nose drains from the back of your nose to your throat. This triggers the cough reflex. Postnasal drip may be due to a sinus infection or allergy. Common allergens include dust, tobacco smoke (both inhaled and secondhand smoke), environmental pollutants, pollen, mold, pets, cleaning agents, room deodorizers, and chemical fumes. Over-the-counter antihistamines or decongestants may be helpful for allergies. A sinus infection may requires antibiotic treatment. See your healthcare provider  if symptoms continue.  Medicines  Certain prescribed medicines can cause a chronic cough in some people:    ACE inhibitors for high blood pressure. These include benazepril, captopril, enalapril, fosinopril, lisinopril, quinapril, ramipril, and others.    Beta-blockers for high blood pressure and other conditions. These include propranolol, atenolol, metoprolol, nadolol, and others.  Let your healthcare provider know if you are taking any of these.  Asthma  Cough may be the only sign of mild asthma. You may have tests to find out if asthma is causing your cough. You may also take asthma medicine on a trial basis.  Acid reflux (heartburn, GERD)   The esophagus is the tube that carries food from the mouth to the stomach. A valve at its lower end prevents stomach acids from flowing upward. If this valve does not work properly, acid from the stomach enters the esophagus. This may cause a burning pain in the upper abdomen or lower chest, belching, or cough. Symptoms are often worse when lying flat. Avoid eating or drinking before bedtime. Try using extra pillows to raise your upper body, or place 4-inch blocks under the head of your bed. You may try an over-the-counter antacid or an acid-blocking medicine such as famotidine, cimetidine, ranitidine, esomeprazole, lansoprazole, or omeprazole. Stronger medicines for this condition can be prescribed by your healthcare provider.  Follow-up care  Follow up with your healthcare provider, or as advised, if your cough does not improve. Further testing may be needed.  (Note: If an X-ray was taken, a specialist will review it. You will be notified of any new findings that may affect your care.)  When to seek medical advice  Call your healthcare provider right away if any of these occur:    Mild wheezing or difficulty breathing    Fever of 100.4 F (38 C) or higher, or as directed by your healthcare provider    Unexpected weight loss    Coughing up large amounts of colored  "sputum    Night sweats (sheets and pajamas get soaking wet)  Call 911, or get immediate medical care  Contact emergency services right away if any of these occur:    Coughing up blood    Moderate to severe trouble breathing or wheezing    2908-0989 [employer]. [address] [place] [address]. All rights reserved. This information is not intended as a substitute for professional medical care. Always follow your healthcare professional's instructions.              Follow-ups after your visit        Who to contact     If you have questions or need follow up information about today's clinic visit or your schedule please contact [facility] directly at [phone].  Normal or non-critical lab and imaging results will be communicated to you by [email or web address], letter or phone within 4 business days after the clinic has received the results. If you do not hear from us within 7 days, please contact the clinic through [email or web address] or phone. If you have a critical or abnormal lab result, we will notify you by phone as soon as possible.  Submit refill requests through [employer] or call your pharmacy and they will forward the refill request to us. Please allow 3 business days for your refill to be completed.          Additional Information About Your Visit        MyChart Information     [employer] lets you send messages to your doctor, view your test results, renew your prescriptions, schedule appointments and more. To sign up, go to [email or web address] . Click on \"Log in\" on the left side of the screen, which will take you to the Welcome page. Then click on \"Sign up Now\" on the right side of the page.     You will be asked to enter the access code listed below, as well as some personal information. Please follow the directions to create your username and password.     Your access code is: [email or web address]  Expires: 2017  5:56 PM     Your access code will  in 90 days. If you need help or a " "new code, please call your [facility] or [phone].        Care EveryWhere ID     This is your Care EveryWhere ID. This could be used by other organizations to access your [place] medical records  [id number]        Your Vitals Were     Pulse Temperature Respirations Height BMI (Body Mass Index) Pulse Oximetry    107 98.6  F (37  C) (Tympanic) 16 5' 5.5\" (1.664 m) 39.15 kg/m2 98%       Blood Pressure from Last 3 Encounters:   02/02/17 158/82   01/24/17 164/103   01/16/17 173/109    Weight from Last 3 Encounters:   02/02/17 239 lb (108.41 kg)   01/24/17 239 lb (108.41 kg)   01/19/16 237 lb (107.502 kg)                 Today's Medication Changes          These changes are accurate as of: 2/2/17 11:51 AM.  If you have any questions, ask your nurse or doctor.               Start taking these medicines.        Dose/Directions    fluconazole 150 MG tablet   Commonly known as:  DIFLUCAN   Used for:  Vaginal itching   Started by:  Meme Moreno APRN CNP        Dose:  150 mg   Take 1 tablet (150 mg) by mouth once for 1 dose   Quantity:  1 tablet   Refills:  0       fluticasone 220 MCG/ACT Inhaler   Commonly known as:  FLOVENT HFA   Used for:  Dyspnea, unspecified type   Started by:  Meme Moreno APRN CNP        Dose:  2 puff   Inhale 2 puffs into the lungs 2 times daily   Quantity:  1 Inhaler   Refills:  1       fluticasone 50 MCG/ACT spray   Commonly known as:  FLONASE   Used for:  Cough   Started by:  Meme Moreno APRN CNP        Dose:  1-2 spray   Spray 1-2 sprays into both nostrils daily   Quantity:  1 Bottle   Refills:  11            Where to get your medicines      These medications were sent to [facility] - [place], [place] - [address]  [address] [facility] [address]     Phone:  [phone]    - fluconazole 150 MG tablet  - fluticasone 220 MCG/ACT Inhaler  - fluticasone 50 MCG/ACT spray             Primary Care Provider Office Phone # Fax #    Claudia " Dani Mitchell [phone]       [facility] [address] [facility] [address]        Thank you!     Thank you for choosing [facility]  for your care. Our goal is always to provide you with excellent care. Hearing back from our patients is one way we can continue to improve our services. Please take a few minutes to complete the written survey that you may receive in the mail after your visit with us. Thank you!             Your Updated Medication List - Protect others around you: Learn how to safely use, store and throw away your medicines at www.disposemymeds.org.          This list is accurate as of: 2/2/17 11:51 AM.  Always use your most recent med list.                   Brand Name Dispense Instructions for use    albuterol 108 (90 BASE) MCG/ACT Inhaler    PROAIR HFA/PROVENTIL HFA/VENTOLIN HFA    1 Inhaler    Inhale 2 puffs into the lungs every 6 hours as needed for shortness of breath / dyspnea or wheezing       amoxicillin-clavulanate 875-125 MG per tablet    AUGMENTIN    20 tablet    Take 1 tablet by mouth 2 times daily       BENADRYL 25 MG capsule   Generic drug:  diphenhydrAMINE      Take 25 mg by mouth every 6 hours as needed for itching or allergies       benzonatate 200 MG capsule    TESSALON    21 capsule    Take 1 capsule (200 mg) by mouth 3 times daily as needed for cough       fluconazole 150 MG tablet    DIFLUCAN    1 tablet    Take 1 tablet (150 mg) by mouth once for 1 dose       fluticasone 220 MCG/ACT Inhaler    FLOVENT HFA    1 Inhaler    Inhale 2 puffs into the lungs 2 times daily       fluticasone 50 MCG/ACT spray    FLONASE    1 Bottle    Spray 1-2 sprays into both nostrils daily       guaiFENesin-codeine 100-10 MG/5ML Soln solution    ROBITUSSIN AC    180 mL    Take 5-10 mLs by mouth every 4 hours as needed for cough       ibuprofen 200 MG tablet    ADVIL/MOTRIN     2 tablets twice daily for back pain       lisinopril 20 MG tablet     PRINIVIL/ZESTRIL    30 tablet    Take 1 tablet (20 mg) by mouth daily       zolpidem 5 MG tablet    AMBIEN    30 tablet    Take 1 tablet (5 mg) by mouth nightly as needed for sleep at bedtime.

## 2017-02-07 DIAGNOSIS — G47.00 PERSISTENT DISORDER OF INITIATING OR MAINTAINING SLEEP: Primary | ICD-10-CM

## 2017-02-07 RX ORDER — ZOLPIDEM TARTRATE 5 MG/1
5 TABLET ORAL
Qty: 30 TABLET | Refills: 0 | Status: SHIPPED | OUTPATIENT
Start: 2017-02-07 | End: 2017-03-06

## 2017-02-07 NOTE — TELEPHONE ENCOUNTER
Ambien 5 mg tab      Last Written Prescription Date:  8/1/16  Last Fill Quantity: 30,   # refills: 5  Last Office Visit with [facility] or  Health prescribing provider: 2/2/17  Future Office visit:       Routing refill request to provider for review/approval because:  Drug not on the [facility], [facility] or  "[employer] refill protocol or controlled substance

## 2017-03-06 DIAGNOSIS — I10 ESSENTIAL HYPERTENSION, BENIGN: ICD-10-CM

## 2017-03-06 DIAGNOSIS — G47.00 PERSISTENT DISORDER OF INITIATING OR MAINTAINING SLEEP: ICD-10-CM

## 2017-03-06 RX ORDER — LISINOPRIL 20 MG/1
20 TABLET ORAL DAILY
Qty: 30 TABLET | Refills: 0 | Status: SHIPPED | OUTPATIENT
Start: 2017-03-06 | End: 2017-04-07

## 2017-03-06 RX ORDER — ZOLPIDEM TARTRATE 5 MG/1
5 TABLET ORAL
Qty: 30 TABLET | Refills: 0 | Status: SHIPPED | OUTPATIENT
Start: 2017-03-06 | End: 2017-03-24

## 2017-03-06 NOTE — TELEPHONE ENCOUNTER
Ambien 5 mg tab      Last Written Prescription Date:  2/7/17  Last Fill Quantity: 30,   # refills: 0  Last Office Visit with [facility] prescribing provider: 2/2/17  Future Office visit:    Next 5 appointments (look out 90 days)     Mar 24, 2017  1:40 PM CDT   [facility]    [address] [place] [address]   [phone]                   Routing refill request to provider for review/approval because:  Drug not on the [facility], [facility] or [facility] refill protocol or controlled substance    Prinivil 20 mg tab      Last Written Prescription Date: 2/2/17  Last Fill Quantity: 30, # refills: 0  Last Office Visit with [facility] or [facility] prescribing provider: 2/2/17  Next 5 appointments (look out 90 days)     Mar 24, 2017  1:40 PM CDT   [facility]    [address] [place] [id number]   [phone]                   Potassium   Date Value Ref Range Status   01/19/2016 4.2 3.4 - 5.3 mmol/L Final     Creatinine   Date Value Ref Range Status   01/19/2016 0.66 0.52 - 1.04 mg/dL Final     BP Readings from Last 3 Encounters:   02/02/17 158/82   01/24/17 (!) 164/103   01/16/17 (!) 173/109

## 2017-03-24 ENCOUNTER — OFFICE VISIT (OUTPATIENT)
Dept: FAMILY MEDICINE | Facility: CLINIC | Age: 47
End: 2017-03-24
Payer: COMMERCIAL

## 2017-03-24 VITALS
HEIGHT: 66 IN | SYSTOLIC BLOOD PRESSURE: 134 MMHG | WEIGHT: 242 LBS | OXYGEN SATURATION: 97 % | HEART RATE: 87 BPM | TEMPERATURE: 98.9 F | BODY MASS INDEX: 38.89 KG/M2 | DIASTOLIC BLOOD PRESSURE: 80 MMHG

## 2017-03-24 DIAGNOSIS — G47.00 PERSISTENT DISORDER OF INITIATING OR MAINTAINING SLEEP: ICD-10-CM

## 2017-03-24 DIAGNOSIS — R05.3 CHRONIC COUGH: ICD-10-CM

## 2017-03-24 DIAGNOSIS — I10 ESSENTIAL HYPERTENSION, BENIGN: ICD-10-CM

## 2017-03-24 LAB
BASOPHILS # BLD AUTO: 0 10E9/L (ref 0–0.2)
BASOPHILS NFR BLD AUTO: 0.3 %
DIFFERENTIAL METHOD BLD: ABNORMAL
EOSINOPHIL # BLD AUTO: 0.2 10E9/L (ref 0–0.7)
EOSINOPHIL NFR BLD AUTO: 1.1 %
ERYTHROCYTE [DISTWIDTH] IN BLOOD BY AUTOMATED COUNT: 12.8 % (ref 10–15)
HCT VFR BLD AUTO: 42.2 % (ref 35–47)
HGB BLD-MCNC: 14.4 G/DL (ref 11.7–15.7)
LYMPHOCYTES # BLD AUTO: 2.5 10E9/L (ref 0.8–5.3)
LYMPHOCYTES NFR BLD AUTO: 19.1 %
MCH RBC QN AUTO: 29.3 PG (ref 26.5–33)
MCHC RBC AUTO-ENTMCNC: 34.1 G/DL (ref 31.5–36.5)
MCV RBC AUTO: 86 FL (ref 78–100)
MONOCYTES # BLD AUTO: 0.9 10E9/L (ref 0–1.3)
MONOCYTES NFR BLD AUTO: 6.7 %
NEUTROPHILS # BLD AUTO: 9.7 10E9/L (ref 1.6–8.3)
NEUTROPHILS NFR BLD AUTO: 72.8 %
PLATELET # BLD AUTO: 366 10E9/L (ref 150–450)
RBC # BLD AUTO: 4.91 10E12/L (ref 3.8–5.2)
WBC # BLD AUTO: 13.3 10E9/L (ref 4–11)

## 2017-03-24 PROCEDURE — 80061 LIPID PANEL: CPT | Performed by: FAMILY MEDICINE

## 2017-03-24 PROCEDURE — 99214 OFFICE O/P EST MOD 30 MIN: CPT | Performed by: FAMILY MEDICINE

## 2017-03-24 PROCEDURE — 36415 COLL VENOUS BLD VENIPUNCTURE: CPT | Performed by: FAMILY MEDICINE

## 2017-03-24 PROCEDURE — 84443 ASSAY THYROID STIM HORMONE: CPT | Performed by: FAMILY MEDICINE

## 2017-03-24 PROCEDURE — 80048 BASIC METABOLIC PNL TOTAL CA: CPT | Performed by: FAMILY MEDICINE

## 2017-03-24 PROCEDURE — 85025 COMPLETE CBC W/AUTO DIFF WBC: CPT | Performed by: FAMILY MEDICINE

## 2017-03-24 PROCEDURE — 80076 HEPATIC FUNCTION PANEL: CPT | Performed by: FAMILY MEDICINE

## 2017-03-24 RX ORDER — LISINOPRIL 20 MG/1
20 TABLET ORAL DAILY
Qty: 30 TABLET | Refills: 0 | Status: CANCELLED | OUTPATIENT
Start: 2017-03-24

## 2017-03-24 RX ORDER — FLUTICASONE PROPIONATE 220 UG/1
2 AEROSOL, METERED RESPIRATORY (INHALATION) 2 TIMES DAILY
Qty: 1 INHALER | Refills: 1 | Status: CANCELLED | OUTPATIENT
Start: 2017-03-24

## 2017-03-24 RX ORDER — ALBUTEROL SULFATE 90 UG/1
2 AEROSOL, METERED RESPIRATORY (INHALATION) EVERY 6 HOURS PRN
Qty: 1 INHALER | Refills: 0 | Status: CANCELLED | OUTPATIENT
Start: 2017-03-24

## 2017-03-24 RX ORDER — ZOLPIDEM TARTRATE 5 MG/1
5 TABLET ORAL
Qty: 30 TABLET | Refills: 5 | Status: SHIPPED | OUTPATIENT
Start: 2017-03-24 | End: 2017-09-28

## 2017-03-24 NOTE — MR AVS SNAPSHOT
"              After Visit Summary   3/24/2017    Nilsa Vazquez    MRN: [id number]           Patient Information     Date Of Birth          1970        Visit Information        Provider Department      3/24/2017 1:40 PM Claudia Mitchell MD [facility]        Today's Diagnoses     Persistent disorder of initiating or maintaining sleep        BENIGN HYPERTENSION        Chronic cough          Care Instructions    Stop lisinopril     Labs today     ambien refilled     See me back in 2 weeks         Follow-ups after your visit        Who to contact     If you have questions or need follow up information about today's clinic visit or your schedule please contact [facility] directly at [phone].  Normal or non-critical lab and imaging results will be communicated to you by MyChart, letter or phone within 4 business days after the clinic has received the results. If you do not hear from us within 7 days, please contact the clinic through MyChart or phone. If you have a critical or abnormal lab result, we will notify you by phone as soon as possible.  Submit refill requests through [employer] or call your pharmacy and they will forward the refill request to us. Please allow 3 business days for your refill to be completed.          Additional Information About Your Visit        MyChart Information     [employer] lets you send messages to your doctor, view your test results, renew your prescriptions, schedule appointments and more. To sign up, go to [email or web address] . Click on \"Log in\" on the left side of the screen, which will take you to the Welcome page. Then click on \"Sign up Now\" on the right side of the page.     You will be asked to enter the access code listed below, as well as some personal information. Please follow the directions to create your username and password.     Your access code is: [email or web address]  Expires: 4/16/2017  6:56 PM     Your access code will " " in 90 days. If you need help or a new code, please call your [place] clinic or [phone].        Care EveryWhere ID     This is your Care EveryWhere ID. This could be used by other organizations to access your [place] medical records  [id number]        Your Vitals Were     Pulse Temperature Height Pulse Oximetry Breastfeeding? BMI (Body Mass Index)    87 98.9  F (37.2  C) (Tympanic) 5' 5.5\" (1.664 m) 97% No 39.66 kg/m2       Blood Pressure from Last 3 Encounters:   17 134/80   17 158/82   17 (!) 164/103    Weight from Last 3 Encounters:   17 242 lb (109.8 kg)   17 239 lb (108.4 kg)   17 239 lb (108.4 kg)              We Performed the Following     Basic metabolic panel     CBC with platelets differential     Hepatic panel     Lipid panel reflex to direct LDL     TSH with free T4 reflex          Where to get your medicines      Some of these will need a paper prescription and others can be bought over the counter.  Ask your nurse if you have questions.     Bring a paper prescription for each of these medications     zolpidem 5 MG tablet          Primary Care Provider Office Phone # Fax #    Claudia Mitchell [phone]       [facility] [address] [place] [id number]        Thank you!     Thank you for choosing [facility]  for your care. Our goal is always to provide you with excellent care. Hearing back from our patients is one way we can continue to improve our services. Please take a few minutes to complete the written survey that you may receive in the mail after your visit with us. Thank you!             Your Updated Medication List - Protect others around you: Learn how to safely use, store and throw away your medicines at www.disposemymeds.org.          This list is accurate as of: 3/24/17  2:12 PM.  Always use your most recent med list.                   Brand Name Dispense Instructions for use    " albuterol 108 (90 BASE) MCG/ACT Inhaler    PROAIR HFA/PROVENTIL HFA/VENTOLIN HFA    1 Inhaler    Inhale 2 puffs into the lungs every 6 hours as needed for shortness of breath / dyspnea or wheezing       BENADRYL 25 MG capsule   Generic drug:  diphenhydrAMINE      Take 25 mg by mouth every 6 hours as needed for itching or allergies       fluticasone 220 MCG/ACT Inhaler    FLOVENT HFA    1 Inhaler    Inhale 2 puffs into the lungs 2 times daily       fluticasone 50 MCG/ACT spray    FLONASE    1 Bottle    Spray 1-2 sprays into both nostrils daily       ibuprofen 200 MG tablet    ADVIL/MOTRIN     2 tablets twice daily for back pain       lisinopril 20 MG tablet    PRINIVIL/ZESTRIL    30 tablet    Take 1 tablet (20 mg) by mouth daily       zolpidem 5 MG tablet    AMBIEN    30 tablet    Take 1 tablet (5 mg) by mouth nightly as needed for sleep at bedtime.

## 2017-03-24 NOTE — NURSING NOTE
"Chief Complaint   Patient presents with     Hypertension     Sleep Problem       Initial /80 (BP Location: Right arm, Patient Position: Chair, Cuff Size: Adult Large)  Pulse 87  Temp 98.9  F (37.2  C) (Tympanic)  Ht 5' 5.5\" (1.664 m)  Wt 242 lb (109.8 kg)  SpO2 97%  Breastfeeding? No  BMI 39.66 kg/m2 Estimated body mass index is 39.66 kg/(m^2) as calculated from the following:    Height as of this encounter: 5' 5.5\" (1.664 m).    Weight as of this encounter: 242 lb (109.8 kg).  Medication Reconciliation: complete        "

## 2017-03-24 NOTE — LETTER
[facility]  [address] [place] [id number]  Phone: [phone]  Fax: [phone]    March 27, 2017      Nilsa Vazquez  [address] [place] [id number]          Dear Ms. Vazquez,    The results of your recent lab tests were within normal limits. Enclosed is a copy of these results.  If you have any further questions or problems, please contact our office.    Sincerely,      Claudia Mitchell MD/ isabell

## 2017-03-24 NOTE — PROGRESS NOTES
"  SUBJECTIVE:                                                    Nilsa Vazquez is a 46 year old female who presents to clinic today for the following health issues:      Hypertension Follow-up      Outpatient blood pressures are not being checked.    Low Salt Diet: not monitoring salt    Patient presents for evaluation of hypertension.  She indicates that she is feeling well and denies any symptoms referable to her elevated blood pressure. Specifically denies chest pain, palpitations, dyspnea, orthopnea, PND or peripheral edema. Current medication regimen is as listed below. Patient denies any side effects of medication.      Pt has had a cough for the past 3-4 months that is a tickle and then gets going and she coughs at times so hard she loses her breath  She has no toher sxs with this and it seemed to start with a URI and then the cough just never went away    She has no GERD sxs no PND sxs she is feeling run down and tired but otherwise is ok just tired of coughing          Amount of exercise or physical activity: 2-3 days/week for an average of 15-30 minutes    Problems taking medications regularly: No    Medication side effects: cough???    Diet: regular (no restrictions)    Sleep Issues    medication refill        Problem list and histories reviewed & adjusted, as indicated.  Additional history: as documented    Labs reviewed in EPIC    Reviewed and updated as needed this visit by clinical staff  Tobacco  Allergies  Meds  Problems  Med Hx  Surg Hx  Fam Hx  Soc Hx        Reviewed and updated as needed this visit by Provider  Allergies  Meds  Problems         ROS:  Constitutional, HEENT, cardiovascular, pulmonary, gi and gu systems are negative, except as otherwise noted.    OBJECTIVE:                                                    /80 (BP Location: Right arm, Patient Position: Chair, Cuff Size: Adult Large)  Pulse 87  Temp 98.9  F (37.2  C) (Tympanic)  Ht 5' 5.5\" (1.664 m)  Wt 242 " lb (109.8 kg)  SpO2 97%  Breastfeeding? No  BMI 39.66 kg/m2  Body mass index is 39.66 kg/(m^2).  GENERAL APPEARANCE: healthy, alert and no distress  RESP: lungs clear to auscultation - no rales, rhonchi or wheezes  CV: regular rates and rhythm, normal S1 S2, no S3 or S4 and no murmur, click or rub  ABDOMEN: soft, nontender, without hepatosplenomegaly or masses and bowel sounds normal  MS: extremities normal- no gross deformities noted  PSYCH: mentation appears normal and affect normal/bright         ASSESSMENT/PLAN:                                                    1. Persistent disorder of initiating or maintaining sleep  Stable no change in treatment plan.   - zolpidem (AMBIEN) 5 MG tablet; Take 1 tablet (5 mg) by mouth nightly as needed for sleep at bedtime.  Dispense: 30 tablet; Refill: 5    2. BENIGN HYPERTENSION  BP well controlled but ? Cough from ace  - TSH with free T4 reflex  - Lipid panel reflex to direct LDL  - CBC with platelets differential  - Basic metabolic panel  - Hepatic panel    3. Chronic cough  ?due to lisinopril       Patient Instructions   Stop lisinopril     Labs today     ambien refilled     See me back in 2 weeks     Risks, benefits, side effects and rationale for treatment plan fully discussed with the patient and understanding expressed.   Claudia Mitchell MD  [facility]

## 2017-03-25 LAB
ALBUMIN SERPL-MCNC: 3.4 G/DL (ref 3.4–5)
ALP SERPL-CCNC: 113 U/L (ref 40–150)
ALT SERPL W P-5'-P-CCNC: 23 U/L (ref 0–50)
ANION GAP SERPL CALCULATED.3IONS-SCNC: 9 MMOL/L (ref 3–14)
AST SERPL W P-5'-P-CCNC: 12 U/L (ref 0–45)
BILIRUB DIRECT SERPL-MCNC: <0.1 MG/DL (ref 0–0.2)
BILIRUB SERPL-MCNC: 0.4 MG/DL (ref 0.2–1.3)
BUN SERPL-MCNC: 11 MG/DL (ref 7–30)
CALCIUM SERPL-MCNC: 8.4 MG/DL (ref 8.5–10.1)
CHLORIDE SERPL-SCNC: 107 MMOL/L (ref 94–109)
CHOLEST SERPL-MCNC: 173 MG/DL
CO2 SERPL-SCNC: 23 MMOL/L (ref 20–32)
CREAT SERPL-MCNC: 0.7 MG/DL (ref 0.52–1.04)
GFR SERPL CREATININE-BSD FRML MDRD: 90 ML/MIN/1.7M2
GLUCOSE SERPL-MCNC: 113 MG/DL (ref 70–99)
HDLC SERPL-MCNC: 36 MG/DL
LDLC SERPL CALC-MCNC: 104 MG/DL
NONHDLC SERPL-MCNC: 137 MG/DL
POTASSIUM SERPL-SCNC: 4 MMOL/L (ref 3.4–5.3)
PROT SERPL-MCNC: 7.4 G/DL (ref 6.8–8.8)
SODIUM SERPL-SCNC: 139 MMOL/L (ref 133–144)
TRIGL SERPL-MCNC: 165 MG/DL
TSH SERPL DL<=0.005 MIU/L-ACNC: 1.38 MU/L (ref 0.4–4)

## 2017-04-07 ENCOUNTER — OFFICE VISIT (OUTPATIENT)
Dept: FAMILY MEDICINE | Facility: CLINIC | Age: 47
End: 2017-04-07
Payer: COMMERCIAL

## 2017-04-07 VITALS
DIASTOLIC BLOOD PRESSURE: 92 MMHG | SYSTOLIC BLOOD PRESSURE: 158 MMHG | TEMPERATURE: 99.1 F | BODY MASS INDEX: 40.48 KG/M2 | WEIGHT: 247 LBS | HEART RATE: 80 BPM

## 2017-04-07 DIAGNOSIS — D44.7 GLOMUS JUGULARE TUMOR (H): ICD-10-CM

## 2017-04-07 DIAGNOSIS — I10 ESSENTIAL HYPERTENSION, BENIGN: Primary | ICD-10-CM

## 2017-04-07 PROCEDURE — 99214 OFFICE O/P EST MOD 30 MIN: CPT | Performed by: FAMILY MEDICINE

## 2017-04-07 RX ORDER — LOSARTAN POTASSIUM 50 MG/1
50 TABLET ORAL DAILY
Qty: 90 TABLET | Refills: 1 | Status: SHIPPED | OUTPATIENT
Start: 2017-04-07 | End: 2017-09-28

## 2017-04-07 NOTE — MR AVS SNAPSHOT
After Visit Summary   4/7/2017    Nilsa Vazquez    MRN: [id number]           Patient Information     Date Of Birth          1970        Visit Information        Provider Department      4/7/2017 9:20 AM Claudia Mitchell MD [facility]        Today's Diagnoses     Essential hypertension, benign    -  1    Glomus jugulare tumor (H)          Care Instructions    Set up ENT appt for follow up     New BP med losartan start this daily     Come in for nurse only visit for BP recheck in 1-2 weeks         Follow-ups after your visit        Additional Services     OTOLARYNGOLOGY REFERRAL       Your provider has referred you to: [facility]: Adult [facility] (Otolaryngology) - [place] [phone]  [email or web address]    Please be aware that coverage of these services is subject to the terms and limitations of your health insurance plan.  Call member services at your health plan with any benefit or coverage questions.      Please bring the following with you to your appointment:    (1) Any X-Rays, CTs or MRIs which have been performed.  Contact the facility where they were done to arrange for  prior to your scheduled appointment.   (2) List of current medications  (3) This referral request   (4) Any documents/labs given to you for this referral                  Who to contact     If you have questions or need follow up information about today's clinic visit or your schedule please contact [facility] directly at [phone].  Normal or non-critical lab and imaging results will be communicated to you by MyChart, letter or phone within 4 business days after the clinic has received the results. If you do not hear from us within 7 days, please contact the clinic through MyChart or phone. If you have a critical or abnormal lab result, we will notify you by phone as soon as possible.  Submit refill  "requests through FX Bridge or call your pharmacy and they will forward the refill request to us. Please allow 3 business days for your refill to be completed.          Additional Information About Your Visit        FX Bridge Information     FX Bridge lets you send messages to your doctor, view your test results, renew your prescriptions, schedule appointments and more. To sign up, go to [email or web address] . Click on \"Log in\" on the left side of the screen, which will take you to the Welcome page. Then click on \"Sign up Now\" on the right side of the page.     You will be asked to enter the access code listed below, as well as some personal information. Please follow the directions to create your username and password.     Your access code is: [email or web address]  Expires: 2017  6:56 PM     Your access code will  in 90 days. If you need help or a new code, please call your [place] clinic or [phone].        Care EveryWhere ID     This is your Care EveryWhere ID. This could be used by other organizations to access your [place] medical records  [id number]        Your Vitals Were     Pulse Temperature BMI (Body Mass Index)             80 99.1  F (37.3  C) (Tympanic) 40.48 kg/m2          Blood Pressure from Last 3 Encounters:   17 (!) 158/92   17 134/80   17 158/82    Weight from Last 3 Encounters:   17 247 lb (112 kg)   17 242 lb (109.8 kg)   17 239 lb (108.4 kg)              We Performed the Following     OTOLARYNGOLOGY REFERRAL          Today's Medication Changes          These changes are accurate as of: 17  9:43 AM.  If you have any questions, ask your nurse or doctor.               Start taking these medicines.        Dose/Directions    losartan 50 MG tablet   Commonly known as:  COZAAR   Used for:  Essential hypertension, benign   Started by:  Claudia Mitchell MD        Dose:  50 mg   Take 1 tablet (50 mg) by mouth daily   Quantity:  90 tablet   Refills:  1 "            Where to get your medicines      These medications were sent to [id number] - [place], [place] - [address]  [address], [facility] [address]     Phone:  [phone]     losartan 50 MG tablet                Primary Care Provider Office Phone # Fax #    Claudia Mitchell [phone]       [facility] [address] [facility] [address]        Thank you!     Thank you for choosing [facility]  for your care. Our goal is always to provide you with excellent care. Hearing back from our patients is one way we can continue to improve our services. Please take a few minutes to complete the written survey that you may receive in the mail after your visit with us. Thank you!             Your Updated Medication List - Protect others around you: Learn how to safely use, store and throw away your medicines at www.disposemymeds.org.          This list is accurate as of: 4/7/17  9:43 AM.  Always use your most recent med list.                   Brand Name Dispense Instructions for use    albuterol 108 (90 BASE) MCG/ACT Inhaler    PROAIR HFA/PROVENTIL HFA/VENTOLIN HFA    1 Inhaler    Inhale 2 puffs into the lungs every 6 hours as needed for shortness of breath / dyspnea or wheezing       BENADRYL 25 MG capsule   Generic drug:  diphenhydrAMINE      Take 25 mg by mouth every 6 hours as needed for itching or allergies       fluticasone 220 MCG/ACT Inhaler    FLOVENT HFA    1 Inhaler    Inhale 2 puffs into the lungs 2 times daily       fluticasone 50 MCG/ACT spray    FLONASE    1 Bottle    Spray 1-2 sprays into both nostrils daily       ibuprofen 200 MG tablet    ADVIL/MOTRIN     2 tablets twice daily for back pain       losartan 50 MG tablet    COZAAR    90 tablet    Take 1 tablet (50 mg) by mouth daily       zolpidem 5 MG tablet    AMBIEN    30 tablet    Take 1 tablet (5 mg) by mouth nightly as needed for sleep at bedtime.

## 2017-04-07 NOTE — PROGRESS NOTES
SUBJECTIVE:                                                    Nilsa Vazquez is a 46 year old female who presents to clinic today for the following health issues:      Hypertension Follow-up      Outpatient blood pressures are not being checked.    Low Salt Diet: not monitoring salt  Patient presents for evaluation of hypertension.  She indicates that she is feeling well and denies any symptoms referable to her elevated blood pressure. Specifically denies chest pain, palpitations, dyspnea, orthopnea, PND or peripheral edema. Current medication regimen is as listed below. Patient denies any side effects of medication.      She did stop the lisinopril and the cough is 70% better but her BP is not well controlled            Amount of exercise or physical activity: 4-5 days/week for an average of 30-45 minutes    Problems taking medications regularly: No    Medication side effects: none    Diet: regular (no restrictions)      GLomus Tumor   She has not been back for follow up in years due to insurance issues  She would like to recheck with ENT/neuro     Problem list and histories reviewed & adjusted, as indicated.  Additional history: as documented    Labs reviewed in EPIC    Reviewed and updated as needed this visit by clinical staff  Tobacco  Allergies  Meds  Problems  Med Hx  Surg Hx  Fam Hx  Soc Hx        Reviewed and updated as needed this visit by Provider  Allergies  Meds  Problems         ROS:  Constitutional, HEENT, cardiovascular, pulmonary, gi and gu systems are negative, except as otherwise noted.    OBJECTIVE:                                                    BP (!) 158/92 (BP Location: Right arm, Patient Position: Chair, Cuff Size: Adult Large)  Pulse 80  Temp 99.1  F (37.3  C) (Tympanic)  Wt 247 lb (112 kg)  BMI 40.48 kg/m2  Body mass index is 40.48 kg/(m^2).  GENERAL APPEARANCE: healthy, alert and no distress  CV: regular rates and rhythm, normal S1 S2, no S3 or S4 and no murmur,  click or rub  PSYCH: mentation appears normal and affect normal/bright         ASSESSMENT/PLAN:                                                    1. Essential hypertension, benign  Not well controlled off meds but cough better off the lisinopril   - losartan (COZAAR) 50 MG tablet; Take 1 tablet (50 mg) by mouth daily  Dispense: 90 tablet; Refill: 1    2. Glomus jugulare tumor (H)  Needs routine follow up   - OTOLARYNGOLOGY REFERRAL      Patient Instructions   Set up ENT appt for follow up     New BP med losartan start this daily     Come in for nurse only visit for BP recheck in 1-2 weeks     Risks, benefits, side effects and rationale for treatment plan fully discussed with the patient and understanding expressed.   Claudia Mitchell MD  [facility]

## 2017-04-07 NOTE — PATIENT INSTRUCTIONS
Set up ENT appt for follow up     New BP med losartan start this daily     Come in for nurse only visit for BP recheck in 1-2 weeks

## 2017-04-07 NOTE — NURSING NOTE
"Chief Complaint   Patient presents with     Hypertension       Initial BP (!) 158/92 (BP Location: Right arm, Patient Position: Chair, Cuff Size: Adult Large)  Pulse 80  Temp 99.1  F (37.3  C) (Tympanic)  Wt 247 lb (112 kg)  BMI 40.48 kg/m2 Estimated body mass index is 40.48 kg/(m^2) as calculated from the following:    Height as of 3/24/17: 5' 5.5\" (1.664 m).    Weight as of this encounter: 247 lb (112 kg).  Medication Reconciliation: complete        "

## 2017-05-04 ENCOUNTER — ALLIED HEALTH/NURSE VISIT (OUTPATIENT)
Dept: FAMILY MEDICINE | Facility: CLINIC | Age: 47
End: 2017-05-04
Payer: COMMERCIAL

## 2017-05-04 VITALS — HEART RATE: 80 BPM | SYSTOLIC BLOOD PRESSURE: 136 MMHG | DIASTOLIC BLOOD PRESSURE: 88 MMHG

## 2017-05-04 DIAGNOSIS — Z01.30 BLOOD PRESSURE CHECK: Primary | ICD-10-CM

## 2017-05-04 PROCEDURE — 99207 ZZC NO CHARGE NURSE ONLY: CPT

## 2017-05-04 NOTE — MR AVS SNAPSHOT
"              After Visit Summary   2017    Nilsa Vazquez    MRN: [id number]           Patient Information     Date Of Birth          1970        Visit Information        Provider Department      2017 1:45 PM FL NB RN [facility]        Today's Diagnoses     Blood pressure check    -  1       Follow-ups after your visit        Who to contact     If you have questions or need follow up information about today's clinic visit or your schedule please contact [facility] directly at [phone].  Normal or non-critical lab and imaging results will be communicated to you by MyChart, letter or phone within 4 business days after the clinic has received the results. If you do not hear from us within 7 days, please contact the clinic through Efieldhart or phone. If you have a critical or abnormal lab result, we will notify you by phone as soon as possible.  Submit refill requests through Arachnys or call your pharmacy and they will forward the refill request to us. Please allow 3 business days for your refill to be completed.          Additional Information About Your Visit        MyChart Information     Arachnys lets you send messages to your doctor, view your test results, renew your prescriptions, schedule appointments and more. To sign up, go to [email or web address] . Click on \"Log in\" on the left side of the screen, which will take you to the Welcome page. Then click on \"Sign up Now\" on the right side of the page.     You will be asked to enter the access code listed below, as well as some personal information. Please follow the directions to create your username and password.     Your access code is: [email or web address]  Expires: 2017  9:54 AM     Your access code will  in 90 days. If you need help or a new code, please call your [facility] or [phone].        Care EveryWhere ID     This is your Care EveryWhere ID. This could be used by other organizations " to access your [place] medical records  [id number]        Your Vitals Were     Pulse                   80            Blood Pressure from Last 3 Encounters:   05/04/17 136/88   04/07/17 (!) 158/92   03/24/17 134/80    Weight from Last 3 Encounters:   04/07/17 247 lb (112 kg)   03/24/17 242 lb (109.8 kg)   02/02/17 239 lb (108.4 kg)              Today, you had the following     No orders found for display       Primary Care Provider Office Phone # Fax #    Claudia Mitchell [phone]       [facility] [address] [facility] [address]        Thank you!     Thank you for choosing [facility]  for your care. Our goal is always to provide you with excellent care. Hearing back from our patients is one way we can continue to improve our services. Please take a few minutes to complete the written survey that you may receive in the mail after your visit with us. Thank you!             Your Updated Medication List - Protect others around you: Learn how to safely use, store and throw away your medicines at www.disposemymeds.org.          This list is accurate as of: 5/4/17 11:59 PM.  Always use your most recent med list.                   Brand Name Dispense Instructions for use    albuterol 108 (90 BASE) MCG/ACT Inhaler    PROAIR HFA/PROVENTIL HFA/VENTOLIN HFA    1 Inhaler    Inhale 2 puffs into the lungs every 6 hours as needed for shortness of breath / dyspnea or wheezing       BENADRYL 25 MG capsule   Generic drug:  diphenhydrAMINE      Take 25 mg by mouth every 6 hours as needed for itching or allergies       fluticasone 220 MCG/ACT Inhaler    FLOVENT HFA    1 Inhaler    Inhale 2 puffs into the lungs 2 times daily       fluticasone 50 MCG/ACT spray    FLONASE    1 Bottle    Spray 1-2 sprays into both nostrils daily       ibuprofen 200 MG tablet    ADVIL/MOTRIN     2 tablets twice daily for back pain       losartan 50 MG tablet    COZAAR    90 tablet    Take 1  tablet (50 mg) by mouth daily       zolpidem 5 MG tablet    AMBIEN    30 tablet    Take 1 tablet (5 mg) by mouth nightly as needed for sleep at bedtime.

## 2017-05-08 NOTE — PROGRESS NOTES
S-(situation): Nilsa is here for a blood pressure check    B-(background): started new med losartan in April 2017.      A-(assessment): BP today is 150/88 and second reading 136/88    R-(recommendations): continue to monitor blood pressure.  Kelsea Torres RN

## 2017-09-28 DIAGNOSIS — G47.00 PERSISTENT DISORDER OF INITIATING OR MAINTAINING SLEEP: ICD-10-CM

## 2017-09-28 DIAGNOSIS — I10 ESSENTIAL HYPERTENSION, BENIGN: ICD-10-CM

## 2017-09-28 RX ORDER — LOSARTAN POTASSIUM 50 MG/1
TABLET ORAL
Qty: 90 TABLET | Refills: 0 | Status: SHIPPED | OUTPATIENT
Start: 2017-09-28 | End: 2017-12-27

## 2017-09-28 NOTE — TELEPHONE ENCOUNTER
Losartan 50 mg       Last Written Prescription Date: 4/7/17  Last Fill Quantity: 90, # refills: 1  Last Office Visit with [facility], [facility] or [facility] prescribing provider: 4/7/17       Potassium   Date Value Ref Range Status   03/24/2017 4.0 3.4 - 5.3 mmol/L Final     Creatinine   Date Value Ref Range Status   03/24/2017 0.70 0.52 - 1.04 mg/dL Final     BP Readings from Last 3 Encounters:   05/04/17 136/88   04/07/17 (!) 158/92   03/24/17 134/80     Losartan 50 mg       Last Written Prescription Date:  4/7/17  Last Fill Quantity: 90,   # refills: 1  Last Office Visit with [facility], [facility] or [facility] prescribing provider: 4/7/17  Future Office visit:       Routing refill request to provider for review/approval because:  Drug not on the [facility], [facility] or [facility] refill protocol or controlled substance

## 2017-09-29 RX ORDER — ZOLPIDEM TARTRATE 5 MG/1
TABLET ORAL
Qty: 30 TABLET | Refills: 5 | Status: SHIPPED | OUTPATIENT
Start: 2017-09-29 | End: 2018-03-27

## 2017-10-02 ENCOUNTER — ALLIED HEALTH/NURSE VISIT (OUTPATIENT)
Dept: FAMILY MEDICINE | Facility: CLINIC | Age: 47
End: 2017-10-02
Payer: COMMERCIAL

## 2017-10-02 DIAGNOSIS — Z23 NEED FOR PROPHYLACTIC VACCINATION AND INOCULATION AGAINST INFLUENZA: Primary | ICD-10-CM

## 2017-10-02 PROCEDURE — 90471 IMMUNIZATION ADMIN: CPT

## 2017-10-02 PROCEDURE — 99207 ZZC NO CHARGE NURSE ONLY: CPT

## 2017-10-02 PROCEDURE — 90686 IIV4 VACC NO PRSV 0.5 ML IM: CPT

## 2017-10-02 NOTE — MR AVS SNAPSHOT
"              After Visit Summary   10/2/2017    Nilsa Vazquez    MRN: [id number]           Patient Information     Date Of Birth          1970        Visit Information        Provider Department      10/2/2017 9:45 AM FL FERNANDA BERGMAN/LPN [facility]        Today's Diagnoses     Need for prophylactic vaccination and inoculation against influenza    -  1       Follow-ups after your visit        Who to contact     If you have questions or need follow up information about today's clinic visit or your schedule please contact [facility] directly at [phone].  Normal or non-critical lab and imaging results will be communicated to you by Clearpath Immigrationhart, letter or phone within 4 business days after the clinic has received the results. If you do not hear from us within 7 days, please contact the clinic through Brandmail Solutionst or phone. If you have a critical or abnormal lab result, we will notify you by phone as soon as possible.  Submit refill requests through Edxact or call your pharmacy and they will forward the refill request to us. Please allow 3 business days for your refill to be completed.          Additional Information About Your Visit        MyChart Information     Edxact lets you send messages to your doctor, view your test results, renew your prescriptions, schedule appointments and more. To sign up, go to [email or web address] . Click on \"Log in\" on the left side of the screen, which will take you to the Welcome page. Then click on \"Sign up Now\" on the right side of the page.     You will be asked to enter the access code listed below, as well as some personal information. Please follow the directions to create your username and password.     Your access code is: [email or web address]  Expires: 2017 12:13 PM     Your access code will  in 90 days. If you need help or a new code, please call your [facility] or [phone].        Care EveryWhere ID     This is your Care " EveryWhere ID. This could be used by other organizations to access your [place] medical records  [id number]         Blood Pressure from Last 3 Encounters:   05/04/17 136/88   04/07/17 (!) 158/92   03/24/17 134/80    Weight from Last 3 Encounters:   04/07/17 247 lb (112 kg)   03/24/17 242 lb (109.8 kg)   02/02/17 239 lb (108.4 kg)              We Performed the Following     FLU VAC, SPLIT VIRUS IM > 3 YO (QUADRIVALENT) [25268]     Vaccine Administration, Initial [34189]        Primary Care Provider Office Phone # Fax #    Claudia Mitchell [phone] [address] [place] [address]        Equal Access to Services     SAMUEL MAY : Kyle Dill, waaxda luqadaha, qaybta kaalmada adeleigh, christopher espinal . So [facility] [phone].    ATENCIÓN: Si habla español, tiene a ross disposición servicios gratuitos de asistencia lingüística. Llame al [phone].    We comply with applicable federal civil rights laws and [place] laws. We do not discriminate on the basis of race, color, national origin, age, disability, sex, sexual orientation, or gender identity.            Thank you!     Thank you for choosing [facility]  for your care. Our goal is always to provide you with excellent care. Hearing back from our patients is one way we can continue to improve our services. Please take a few minutes to complete the written survey that you may receive in the mail after your visit with us. Thank you!             Your Updated Medication List - Protect others around you: Learn how to safely use, store and throw away your medicines at www.disposemymeds.org.          This list is accurate as of: 10/2/17 12:13 PM.  Always use your most recent med list.                   Brand Name Dispense Instructions for use Diagnosis    albuterol 108 (90 BASE) MCG/ACT Inhaler    PROAIR HFA/PROVENTIL HFA/VENTOLIN HFA    1 Inhaler    Inhale 2 puffs into the  lungs every 6 hours as needed for shortness of breath / dyspnea or wheezing        BENADRYL 25 MG capsule   Generic drug:  diphenhydrAMINE      Take 25 mg by mouth every 6 hours as needed for itching or allergies        fluticasone 220 MCG/ACT Inhaler    FLOVENT HFA    1 Inhaler    Inhale 2 puffs into the lungs 2 times daily    Dyspnea, unspecified type, Chronic cough       fluticasone 50 MCG/ACT spray    FLONASE    1 Bottle    Spray 1-2 sprays into both nostrils daily    Chronic cough       ibuprofen 200 MG tablet    ADVIL/MOTRIN     2 tablets twice daily for back pain        losartan 50 MG tablet    COZAAR    90 tablet    TAKE ONE TABLET BY MOUTH ONCE DAILY    Essential hypertension, benign       zolpidem 5 MG tablet    AMBIEN    30 tablet    TAKE ONE TABLET BY MOUTH NIGHTLY AS NEEDED FOR SLEEP AT BEDTIME    Persistent disorder of initiating or maintaining sleep

## 2018-03-27 DIAGNOSIS — I10 ESSENTIAL HYPERTENSION, BENIGN: ICD-10-CM

## 2018-03-27 DIAGNOSIS — G47.00 PERSISTENT DISORDER OF INITIATING OR MAINTAINING SLEEP: ICD-10-CM

## 2018-03-27 RX ORDER — LOSARTAN POTASSIUM 50 MG/1
TABLET ORAL
Qty: 90 TABLET | Refills: 0 | Status: SHIPPED | OUTPATIENT
Start: 2018-03-27 | End: 2018-04-24

## 2018-03-27 RX ORDER — ZOLPIDEM TARTRATE 5 MG/1
TABLET ORAL
Qty: 30 TABLET | Refills: 3 | Status: SHIPPED | OUTPATIENT
Start: 2018-03-27 | End: 2018-04-24

## 2018-03-27 NOTE — TELEPHONE ENCOUNTER
"Requested Prescriptions   Pending Prescriptions Disp Refills     losartan (COZAAR) 50 MG tablet [Pharmacy Med Name: LOSARTAN 50MG TAB] 90 tablet 0     Sig: TAKE ONE TABLET BY MOUTH ONCE DAILY    Angiotensin-II Receptors Failed    3/27/2018  5:30 AM       Failed - Normal serum creatinine on file in past 12 months    Recent Labs   Lab Test  03/24/17   1413   CR  0.70            Failed - Normal serum potassium on file in past 12 months    Recent Labs   Lab Test  03/24/17   1413   POTASSIUM  4.0                   Passed - Blood pressure under 140/90 in past 12 months    BP Readings from Last 3 Encounters:   05/04/17 136/88   04/07/17 (!) 158/92   03/24/17 134/80                Passed - Recent (12 mo) or future (30 days) visit within the authorizing provider's specialty    Patient had office visit in the last 12 months or has a visit in the next 30 days with authorizing provider or within the authorizing provider's specialty.  See \"Patient Info\" tab in inbasket, or \"Choose Columns\" in Meds & Orders section of the refill encounter.           Passed - Patient is age 18 or older       Passed - No active pregnancy on record       Passed - No positive pregnancy test in past 12 months        zolpidem (AMBIEN) 5 MG tablet [Pharmacy Med Name: ZOLPIDEM 5MG        TAB] 30 tablet 5     Sig: TAKE ONE TABLET BY MOUTH ONCE DAILY AS NEEDED FOR SLEEP AT BEDTIME    There is no refill protocol information for this order        Last Written Prescription Date:  12/28/2017  Last Fill Quantity: 90,  # refills: 0   Last office visit: 10/2/2017 with prescribing provider:  Paula   Future Office Visit:    Controlled Substance Refill Request for zolpidem (AMBIEN) 5 MG tablet  Problem List Complete:  No     PROVIDER TO CONSIDER COMPLETION OF PROBLEM LIST AND OVERVIEW/CONTROLLED SUBSTANCE AGREEMENT      Controlled Substance Refill Request for zolpidem (AMBIEN) 5 MG tablet  Problem List Complete:  Yes   checked in past 6 months?  No, route to " RN

## 2018-04-24 ENCOUNTER — OFFICE VISIT (OUTPATIENT)
Dept: FAMILY MEDICINE | Facility: CLINIC | Age: 48
End: 2018-04-24
Payer: COMMERCIAL

## 2018-04-24 VITALS
SYSTOLIC BLOOD PRESSURE: 132 MMHG | TEMPERATURE: 99.3 F | WEIGHT: 246 LBS | HEART RATE: 60 BPM | HEIGHT: 65 IN | DIASTOLIC BLOOD PRESSURE: 84 MMHG | BODY MASS INDEX: 40.98 KG/M2

## 2018-04-24 DIAGNOSIS — G47.00 PERSISTENT DISORDER OF INITIATING OR MAINTAINING SLEEP: ICD-10-CM

## 2018-04-24 DIAGNOSIS — I10 ESSENTIAL HYPERTENSION, BENIGN: Primary | ICD-10-CM

## 2018-04-24 DIAGNOSIS — E66.01 MORBID OBESITY (H): ICD-10-CM

## 2018-04-24 DIAGNOSIS — D44.7 GLOMUS JUGULARE TUMOR (H): ICD-10-CM

## 2018-04-24 LAB
ALBUMIN SERPL-MCNC: 3.4 G/DL (ref 3.4–5)
ALP SERPL-CCNC: 128 U/L (ref 40–150)
ALT SERPL W P-5'-P-CCNC: 21 U/L (ref 0–50)
ANION GAP SERPL CALCULATED.3IONS-SCNC: 8 MMOL/L (ref 3–14)
AST SERPL W P-5'-P-CCNC: 14 U/L (ref 0–45)
BASOPHILS # BLD AUTO: 0 10E9/L (ref 0–0.2)
BASOPHILS NFR BLD AUTO: 0.3 %
BILIRUB DIRECT SERPL-MCNC: <0.1 MG/DL (ref 0–0.2)
BILIRUB SERPL-MCNC: 0.3 MG/DL (ref 0.2–1.3)
BUN SERPL-MCNC: 16 MG/DL (ref 7–30)
CALCIUM SERPL-MCNC: 8.9 MG/DL (ref 8.5–10.1)
CHLORIDE SERPL-SCNC: 107 MMOL/L (ref 94–109)
CHOLEST SERPL-MCNC: 165 MG/DL
CO2 SERPL-SCNC: 20 MMOL/L (ref 20–32)
CREAT SERPL-MCNC: 0.63 MG/DL (ref 0.52–1.04)
DIFFERENTIAL METHOD BLD: ABNORMAL
EOSINOPHIL # BLD AUTO: 0.1 10E9/L (ref 0–0.7)
EOSINOPHIL NFR BLD AUTO: 0.6 %
ERYTHROCYTE [DISTWIDTH] IN BLOOD BY AUTOMATED COUNT: 12.9 % (ref 10–15)
GFR SERPL CREATININE-BSD FRML MDRD: >90 ML/MIN/1.7M2
GLUCOSE SERPL-MCNC: 98 MG/DL (ref 70–99)
HCT VFR BLD AUTO: 42.3 % (ref 35–47)
HDLC SERPL-MCNC: 38 MG/DL
HGB BLD-MCNC: 14.4 G/DL (ref 11.7–15.7)
LDLC SERPL CALC-MCNC: 97 MG/DL
LYMPHOCYTES # BLD AUTO: 3.5 10E9/L (ref 0.8–5.3)
LYMPHOCYTES NFR BLD AUTO: 24.7 %
MCH RBC QN AUTO: 29 PG (ref 26.5–33)
MCHC RBC AUTO-ENTMCNC: 34 G/DL (ref 31.5–36.5)
MCV RBC AUTO: 85 FL (ref 78–100)
MONOCYTES # BLD AUTO: 1 10E9/L (ref 0–1.3)
MONOCYTES NFR BLD AUTO: 6.9 %
NEUTROPHILS # BLD AUTO: 9.6 10E9/L (ref 1.6–8.3)
NEUTROPHILS NFR BLD AUTO: 67.5 %
NONHDLC SERPL-MCNC: 127 MG/DL
PLATELET # BLD AUTO: 359 10E9/L (ref 150–450)
POTASSIUM SERPL-SCNC: 3.6 MMOL/L (ref 3.4–5.3)
PROT SERPL-MCNC: 7.6 G/DL (ref 6.8–8.8)
RBC # BLD AUTO: 4.96 10E12/L (ref 3.8–5.2)
SODIUM SERPL-SCNC: 135 MMOL/L (ref 133–144)
TRIGL SERPL-MCNC: 150 MG/DL
TSH SERPL DL<=0.005 MIU/L-ACNC: 2.02 MU/L (ref 0.4–4)
WBC # BLD AUTO: 14.2 10E9/L (ref 4–11)

## 2018-04-24 PROCEDURE — 80061 LIPID PANEL: CPT | Performed by: FAMILY MEDICINE

## 2018-04-24 PROCEDURE — 84443 ASSAY THYROID STIM HORMONE: CPT | Performed by: FAMILY MEDICINE

## 2018-04-24 PROCEDURE — 80076 HEPATIC FUNCTION PANEL: CPT | Performed by: FAMILY MEDICINE

## 2018-04-24 PROCEDURE — 36415 COLL VENOUS BLD VENIPUNCTURE: CPT | Performed by: FAMILY MEDICINE

## 2018-04-24 PROCEDURE — 85025 COMPLETE CBC W/AUTO DIFF WBC: CPT | Performed by: FAMILY MEDICINE

## 2018-04-24 PROCEDURE — 99214 OFFICE O/P EST MOD 30 MIN: CPT | Performed by: FAMILY MEDICINE

## 2018-04-24 PROCEDURE — 80048 BASIC METABOLIC PNL TOTAL CA: CPT | Performed by: FAMILY MEDICINE

## 2018-04-24 RX ORDER — ZOLPIDEM TARTRATE 5 MG/1
TABLET ORAL
Qty: 30 TABLET | Refills: 5 | Status: SHIPPED | OUTPATIENT
Start: 2018-04-24 | End: 2018-10-24

## 2018-04-24 RX ORDER — LOSARTAN POTASSIUM 50 MG/1
50 TABLET ORAL DAILY
Qty: 90 TABLET | Refills: 3 | Status: SHIPPED | OUTPATIENT
Start: 2018-04-24 | End: 2019-06-28

## 2018-04-24 NOTE — MR AVS SNAPSHOT
"              After Visit Summary   4/24/2018    Nilsa Vazquez    MRN: [id number]           Patient Information     Date Of Birth          1970        Visit Information        Provider Department      4/24/2018 2:00 PM Claudia Mitchell MD [facility]        Today's Diagnoses     Glomus jugulare tumor (H)    -  1    Essential hypertension, benign        Persistent disorder of initiating or maintaining sleep          Care Instructions    Schedule a pap and physical.    meds refilled     Labs today           Follow-ups after your visit        Who to contact     If you have questions or need follow up information about today's clinic visit or your schedule please contact [facility] directly at [phone].  Normal or non-critical lab and imaging results will be communicated to you by MyChart, letter or phone within 4 business days after the clinic has received the results. If you do not hear from us within 7 days, please contact the clinic through MyChart or phone. If you have a critical or abnormal lab result, we will notify you by phone as soon as possible.  Submit refill requests through [employer] or call your pharmacy and they will forward the refill request to us. Please allow 3 business days for your refill to be completed.          Additional Information About Your Visit        MyChart Information     [employer] lets you send messages to your doctor, view your test results, renew your prescriptions, schedule appointments and more. To sign up, go to [email or web address] . Click on \"Log in\" on the left side of the screen, which will take you to the Welcome page. Then click on \"Sign up Now\" on the right side of the page.     You will be asked to enter the access code listed below, as well as some personal information. Please follow the directions to create your username and password.     Your access code is: [email or web address]  Expires: 7/23/2018  2:28 PM     Your access " "code will  in 90 days. If you need help or a new code, please call your [facility] clinic or [phone].        Care EveryWhere ID     This is your Care EveryWhere ID. This could be used by other organizations to access your [facility] medical records  [id number]        Your Vitals Were     Pulse Temperature Height Breastfeeding? BMI (Body Mass Index)       60 99.3  F (37.4  C) (Tympanic) 5' 5\" (1.651 m) No 40.94 kg/m2        Blood Pressure from Last 3 Encounters:   18 132/84   17 136/88   17 (!) 158/92    Weight from Last 3 Encounters:   18 246 lb (111.6 kg)   17 247 lb (112 kg)   17 242 lb (109.8 kg)              We Performed the Following     Basic metabolic panel     CBC with platelets differential     Hepatic panel     Lipid panel reflex to direct LDL Fasting     TSH with free T4 reflex          Today's Medication Changes          These changes are accurate as of 18  2:28 PM.  If you have any questions, ask your nurse or doctor.               These medicines have changed or have updated prescriptions.        Dose/Directions    losartan 50 MG tablet   Commonly known as:  COZAAR   This may have changed:  See the new instructions.   Used for:  Essential hypertension, benign   Changed by:  Claudia Mitchell MD        Dose:  50 mg   Take 1 tablet (50 mg) by mouth daily   Quantity:  90 tablet   Refills:  3       zolpidem 5 MG tablet   Commonly known as:  AMBIEN   This may have changed:  See the new instructions.   Used for:  Persistent disorder of initiating or maintaining sleep   Changed by:  Claudia Mitchell MD        TAKE ONE TABLET BY MOUTH ONCE DAILY AS NEEDED FOR SLEEP AT BEDTIME   Quantity:  30 tablet   Refills:  5            Where to get your medicines      These medications were sent to [facility] [address] [address]     Phone:  [phone]     losartan 50 MG tablet    "      Some of these will need a paper prescription and others can be bought over the counter.  Ask your nurse if you have questions.     Bring a paper prescription for each of these medications     zolpidem 5 MG tablet                Primary Care Provider Office Phone # Fax #    Claudia Mitchell [phone] [address] [place] [id number]        Equal Access to Services     [facility] CenterPRISCILLA : Hadii aad ku hadasho Soomaali, waaxda luqadaha, qaybta kaalmada adeegyada, waxay idiin hayaan adeeg kharash la'melanien ah. So [facility] [phone].    ATENCIÓN: Si habla espbenjamin, tiene a ross disposición servicios gratuitos de asistencia lingüística. Llame al [phone].    We comply with applicable federal civil rights laws and [place] laws. We do not discriminate on the basis of race, color, national origin, age, disability, sex, sexual orientation, or gender identity.            Thank you!     Thank you for choosing [facility]  for your care. Our goal is always to provide you with excellent care. Hearing back from our patients is one way we can continue to improve our services. Please take a few minutes to complete the written survey that you may receive in the mail after your visit with us. Thank you!             Your Updated Medication List - Protect others around you: Learn how to safely use, store and throw away your medicines at www.disposemymeds.org.          This list is accurate as of 4/24/18  2:28 PM.  Always use your most recent med list.                   Brand Name Dispense Instructions for use Diagnosis    BENADRYL 25 MG capsule   Generic drug:  diphenhydrAMINE      Take 25 mg by mouth every 6 hours as needed for itching or allergies        ibuprofen 200 MG tablet    ADVIL/MOTRIN     2 tablets twice daily for back pain        losartan 50 MG tablet    COZAAR    90 tablet    Take 1 tablet (50 mg) by mouth daily    Essential hypertension, benign       zolpidem 5 MG tablet     AMBIEN    30 tablet    TAKE ONE TABLET BY MOUTH ONCE DAILY AS NEEDED FOR SLEEP AT BEDTIME    Persistent disorder of initiating or maintaining sleep

## 2018-04-24 NOTE — PROGRESS NOTES
"  SUBJECTIVE:   Nilsa Vazquez is a 47 year old female who presents to clinic today for the following health issues:      Hypertension Follow-up      Outpatient blood pressures are not being checked.    Low Salt Diet: not monitoring salt  Patient presents for evaluation of hypertension.  She indicates that she is feeling well and denies any symptoms referable to her elevated blood pressure. Specifically denies chest pain, palpitations, dyspnea, orthopnea, PND or peripheral edema. Current medication regimen is as listed below. Patient denies any side effects of medication.          Amount of exercise or physical activity: 4-5 days/week for an average of 15-30 minutes    Problems taking medications regularly: No    Medication side effects: none    Diet: regular (no restrictions)      Insomnia  ambien is working well   No concerns at all     Obesity   Working on diet but lots of stress and stuff going on   Will commit to this     Glomus tumor   Needs to have ent recheck           Problem list and histories reviewed & adjusted, as indicated.  Additional history: as documented    Labs reviewed in EPIC    Reviewed and updated as needed this visit by clinical staff  Tobacco  Allergies  Meds  Problems  Med Hx  Surg Hx  Fam Hx  Soc Hx        Reviewed and updated as needed this visit by Provider  Allergies  Meds  Problems         ROS:  Constitutional, HEENT, cardiovascular, pulmonary, gi and gu systems are negative, except as otherwise noted.    OBJECTIVE:                                                    /84 (BP Location: Right arm, Patient Position: Chair, Cuff Size: Adult Large)  Pulse 60  Temp 99.3  F (37.4  C) (Tympanic)  Ht 5' 5\" (1.651 m)  Wt 246 lb (111.6 kg)  Breastfeeding? No  BMI 40.94 kg/m2  Body mass index is 40.94 kg/(m^2).  GENERAL APPEARANCE: healthy, alert and no distress  RESP: lungs clear to auscultation - no rales, rhonchi or wheezes  CV: regular rates and rhythm, normal S1 S2, no " S3 or S4 and no murmur, click or rub  MS: extremities normal- no gross deformities noted  PSYCH: mentation appears normal and affect normal/bright         ASSESSMENT/PLAN:                                                    1. Essential hypertension, benign  Stable no change in treatment plan.   - losartan (COZAAR) 50 MG tablet; Take 1 tablet (50 mg) by mouth daily  Dispense: 90 tablet; Refill: 3  - Hepatic panel  - Basic metabolic panel  - CBC with platelets differential  - TSH with free T4 reflex  - Lipid panel reflex to direct LDL Fasting    2. Persistent disorder of initiating or maintaining sleep  Stable no change in treatment plan.   - zolpidem (AMBIEN) 5 MG tablet; TAKE ONE TABLET BY MOUTH ONCE DAILY AS NEEDED FOR SLEEP AT BEDTIME  Dispense: 30 tablet; Refill: 5    3. Glomus jugulare tumor (H)  Needs ENT recheck and she will set this up     4. Morbid obesity (H)  Will work on diet and exercise       Patient Instructions   Schedule a pap and physical.    meds refilled     Labs today     Risks, benefits, side effects and rationale for treatment plan fully discussed with the patient and understanding expressed.   Claudia Mitchell MD  [facility]

## 2018-04-24 NOTE — NURSING NOTE
"Chief Complaint   Patient presents with     Hypertension     Sleep Problem       Initial /84 (BP Location: Right arm, Patient Position: Chair, Cuff Size: Adult Large)  Pulse 60  Temp 99.3  F (37.4  C) (Tympanic)  Ht 5' 5\" (1.651 m)  Wt 246 lb (111.6 kg)  Breastfeeding? No  BMI 40.94 kg/m2 Estimated body mass index is 40.94 kg/(m^2) as calculated from the following:    Height as of this encounter: 5' 5\" (1.651 m).    Weight as of this encounter: 246 lb (111.6 kg).      Health Maintenance that is potentially due pending provider review:  NONE    n/a    Is there anyone who you would like to be able to receive your results? No  If yes have patient fill out CARLOTA    "

## 2018-04-29 ENCOUNTER — HEALTH MAINTENANCE LETTER (OUTPATIENT)
Age: 48
End: 2018-04-29

## 2018-10-24 DIAGNOSIS — G47.00 PERSISTENT DISORDER OF INITIATING OR MAINTAINING SLEEP: ICD-10-CM

## 2018-10-24 RX ORDER — ZOLPIDEM TARTRATE 5 MG/1
TABLET ORAL
Qty: 30 TABLET | Refills: 5 | Status: SHIPPED | OUTPATIENT
Start: 2018-10-24 | End: 2019-04-22

## 2018-10-24 NOTE — TELEPHONE ENCOUNTER
Ambien 5 mg      Last Written Prescription Date:  4/24/18  Last Fill Quantity: 30,   # refills: 5  Last Office Visit: 4/24/18  [place]  Future Office visit:       Routing refill request to provider for review/approval because:  Drug not on the [facility], [facility] or [facility] refill protocol or controlled substance

## 2018-10-24 NOTE — TELEPHONE ENCOUNTER
RX [place] reviewed:  reviewed- no concerns    [place]:  [email or web address]

## 2019-04-22 DIAGNOSIS — G47.00 PERSISTENT DISORDER OF INITIATING OR MAINTAINING SLEEP: ICD-10-CM

## 2019-04-22 RX ORDER — ZOLPIDEM TARTRATE 5 MG/1
TABLET ORAL
Qty: 30 TABLET | Refills: 0 | Status: SHIPPED | OUTPATIENT
Start: 2019-04-22 | End: 2019-05-24

## 2019-04-22 NOTE — TELEPHONE ENCOUNTER
Requested Prescriptions   Pending Prescriptions Disp Refills     zolpidem (AMBIEN) 5 MG tablet 30 tablet 5     Sig: TAKE ONE TABLET BY MOUTH ONCE DAILY AS NEEDED FOR SLEEP AT BEDTIME       There is no refill protocol information for this order        Zolpidem 5 mg      Last Written Prescription Date:  10/24/18  Last Fill Quantity: 30,   # refills: 5  Last Office Visit: 4/24/18  Future Office visit:       Routing refill request to provider for review/approval because:  Drug not on the [facility], P or [facility] refill protocol or controlled substance

## 2019-06-28 ENCOUNTER — OFFICE VISIT (OUTPATIENT)
Dept: FAMILY MEDICINE | Facility: CLINIC | Age: 49
End: 2019-06-28
Payer: COMMERCIAL

## 2019-06-28 VITALS
RESPIRATION RATE: 14 BRPM | HEIGHT: 65 IN | TEMPERATURE: 97.7 F | HEART RATE: 87 BPM | DIASTOLIC BLOOD PRESSURE: 86 MMHG | BODY MASS INDEX: 34.82 KG/M2 | WEIGHT: 209 LBS | SYSTOLIC BLOOD PRESSURE: 158 MMHG

## 2019-06-28 DIAGNOSIS — D44.7 GLOMUS JUGULARE TUMOR (H): ICD-10-CM

## 2019-06-28 DIAGNOSIS — E66.01 MORBID OBESITY (H): ICD-10-CM

## 2019-06-28 DIAGNOSIS — G47.00 PERSISTENT DISORDER OF INITIATING OR MAINTAINING SLEEP: ICD-10-CM

## 2019-06-28 DIAGNOSIS — Z01.419 ENCOUNTER FOR GYNECOLOGICAL EXAMINATION WITHOUT ABNORMAL FINDING: Primary | ICD-10-CM

## 2019-06-28 DIAGNOSIS — I10 ESSENTIAL HYPERTENSION, BENIGN: ICD-10-CM

## 2019-06-28 PROCEDURE — G0124 SCREEN C/V THIN LAYER BY MD: HCPCS | Performed by: FAMILY MEDICINE

## 2019-06-28 PROCEDURE — G0145 SCR C/V CYTO,THINLAYER,RESCR: HCPCS | Performed by: FAMILY MEDICINE

## 2019-06-28 PROCEDURE — 87624 HPV HI-RISK TYP POOLED RSLT: CPT | Performed by: FAMILY MEDICINE

## 2019-06-28 PROCEDURE — 99396 PREV VISIT EST AGE 40-64: CPT | Performed by: FAMILY MEDICINE

## 2019-06-28 RX ORDER — ZOLPIDEM TARTRATE 5 MG/1
TABLET ORAL
Qty: 30 TABLET | Refills: 5 | Status: SHIPPED | OUTPATIENT
Start: 2019-06-28 | End: 2019-12-20

## 2019-06-28 RX ORDER — LOSARTAN POTASSIUM 50 MG/1
50 TABLET ORAL DAILY
Qty: 90 TABLET | Refills: 3 | Status: SHIPPED | OUTPATIENT
Start: 2019-06-28 | End: 2020-06-16

## 2019-06-28 ASSESSMENT — ENCOUNTER SYMPTOMS
PALPITATIONS: 0
WEAKNESS: 0
FREQUENCY: 0
JOINT SWELLING: 0
PARESTHESIAS: 0
DYSURIA: 0
BREAST MASS: 0
CONSTIPATION: 0
NERVOUS/ANXIOUS: 0
HEADACHES: 0
SHORTNESS OF BREATH: 0
DIARRHEA: 0
HEMATOCHEZIA: 0
FEVER: 0
NAUSEA: 0
MYALGIAS: 0
HEMATURIA: 0
EYE PAIN: 0
DIZZINESS: 0
ARTHRALGIAS: 0
CHILLS: 0
COUGH: 0
SORE THROAT: 0
HEARTBURN: 0
ABDOMINAL PAIN: 0

## 2019-06-28 ASSESSMENT — MIFFLIN-ST. JEOR: SCORE: 1573.9

## 2019-06-28 NOTE — NURSING NOTE
"Chief Complaint   Patient presents with     Physical       Initial /86 (BP Location: Right arm, Patient Position: Chair, Cuff Size: Adult Large)   Pulse 87   Temp 97.7  F (36.5  C) (Tympanic)   Resp 14   Ht 1.651 m (5' 5\")   Wt 94.8 kg (209 lb)   LMP 06/12/2019   BMI 34.78 kg/m   Estimated body mass index is 34.78 kg/m  as calculated from the following:    Height as of this encounter: 1.651 m (5' 5\").    Weight as of this encounter: 94.8 kg (209 lb).    Patient presents to the clinic using No DME    Health Maintenance that is potentially due pending provider review:  NONE        Is there anyone who you would like to be able to receive your results? No  If yes have patient fill out CARLOTA      "

## 2019-06-28 NOTE — PROGRESS NOTES
SUBJECTIVE:   CC: Nilsa Vazquez is an 48 year old woman who presents for preventive health visit.     Healthy Habits:     Getting at least 3 servings of Calcium per day:  NO    Bi-annual eye exam:  NO    Dental care twice a year:  NO    Sleep apnea or symptoms of sleep apnea:  None    Diet:  Carbohydrate counting    Frequency of exercise:  4-5 days/week    Duration of exercise:  30-45 minutes    Taking medications regularly:  Yes    Medication side effects:  None    PHQ-2 Total Score: 0    Additional concerns today:  Yes          * Medication refills    * Mole she would like looked at, located on her left breast    Today's PHQ-2 Score:   PHQ-2 (  [employer]) 2019   Q1: Little interest or pleasure in doing things 0   Q2: Feeling down, depressed or hopeless 0   PHQ-2 Score 0   Q1: Little interest or pleasure in doing things Not at all   Q2: Feeling down, depressed or hopeless Not at all   PHQ-2 Score 0       Abuse: Current or Past(Physical, Sexual or Emotional)- No  Do you feel safe in your environment? Yes    Social History     Tobacco Use     Smoking status: Former Smoker     Packs/day: 1.00     Years: 25.00     Pack years: 25.00     Types: Cigarettes     Last attempt to quit: 2005     Years since quittin.9     Smokeless tobacco: Never Used   Substance Use Topics     Alcohol use: Yes     Comment: occ         Alcohol Use 2019   Prescreen: >3 drinks/day or >7 drinks/week? No   Prescreen: >3 drinks/day or >7 drinks/week? -       Reviewed orders with patient.  Reviewed health maintenance and updated orders accordingly - Yes  Labs reviewed in EPIC    Mammogram Screening: Patient under age 50, mutual decision reflected in health maintenance.      Pertinent mammograms are reviewed under the imaging tab.  History of abnormal Pap smear: NO - age 30- 65 PAP every 3 years recommended  PAP / HPV 2008   PAP NIL NIL     Reviewed and updated as needed this visit by clinical staff  Tobacco  " Allergies  Meds  Problems  Med Hx  Surg Hx  Fam Hx  Soc Hx          Reviewed and updated as needed this visit by Provider  Tobacco  Allergies  Meds  Problems  Med Hx  Surg Hx  Fam Hx            Review of Systems   Constitutional: Negative for chills and fever.   HENT: Negative for congestion, ear pain, hearing loss and sore throat.    Eyes: Negative for pain and visual disturbance.   Respiratory: Negative for cough and shortness of breath.    Cardiovascular: Negative for chest pain, palpitations and peripheral edema.   Gastrointestinal: Negative for abdominal pain, constipation, diarrhea, heartburn, hematochezia and nausea.   Breasts:  Positive for tenderness. Negative for breast mass and discharge.   Genitourinary: Negative for dysuria, frequency, genital sores, hematuria, pelvic pain, urgency, vaginal bleeding and vaginal discharge.   Musculoskeletal: Negative for arthralgias, joint swelling and myalgias.   Skin: Negative for rash.   Neurological: Negative for dizziness, weakness, headaches and paresthesias.   Psychiatric/Behavioral: Negative for mood changes. The patient is not nervous/anxious.           OBJECTIVE:   /86 (BP Location: Right arm, Patient Position: Chair, Cuff Size: Adult Large)   Pulse 87   Temp 97.7  F (36.5  C) (Tympanic)   Resp 14   Ht 1.651 m (5' 5\")   Wt 94.8 kg (209 lb)   LMP 06/12/2019   BMI 34.78 kg/m    Physical Exam  GENERAL: healthy, alert and no distress  EYES: Eyes grossly normal to inspection, PERRL and conjunctivae and sclerae normal  HENT: ear canals and TM's normal, nose and mouth without ulcers or lesions  NECK: no adenopathy, no asymmetry, masses, or scars and thyroid normal to palpation  RESP: lungs clear to auscultation - no rales, rhonchi or wheezes  BREAST: normal without masses, tenderness or nipple discharge and no palpable axillary masses or adenopathy  CV: regular rate and rhythm, normal S1 S2, no S3 or S4, no murmur, click or rub, no " "peripheral edema and peripheral pulses strong  ABDOMEN: soft, nontender, no hepatosplenomegaly, no masses and bowel sounds normal  MS: no gross musculoskeletal defects noted, no edema  SKIN: no suspicious lesions or rashes  NEURO: Normal strength and tone, mentation intact and speech normal  PSYCH: mentation appears normal, affect normal/bright    Diagnostic Test Results:  Labs reviewed in Epic    ASSESSMENT/PLAN:   1. Encounter for gynecological examination without abnormal finding    - Pap imaged thin layer screen with HPV - recommended age 30 - 65  - HPV High Risk Types DNA Cervical    2. Persistent disorder of initiating or maintaining sleep  Stable no change in treatment plan.   - zolpidem (AMBIEN) 5 MG tablet; TAKE ONE TABLET BY MOUTH ONCE DAILY AS NEEDED FOR SLEEP AT BEDTIME  Dispense: 30 tablet; Refill: 5    3. Essential hypertension, benign  Stable no change in treatment plan.   - losartan (COZAAR) 50 MG tablet; Take 1 tablet (50 mg) by mouth daily  Dispense: 90 tablet; Refill: 3  - Hepatic panel; Future  - Basic metabolic panel; Future  - CBC with platelets differential; Future  - TSH with free T4 reflex; Future  - Lipid panel reflex to direct LDL Fasting; Future    4. 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    Weight as of this encounter: 94.8 kg (209 lb).    Weight management plan: Discussed healthy diet and exercise guidelines     reports that she quit smoking about 13 years ago. Her smoking use included cigarettes. She has a 25.00 pack-year smoking history. She has never used smokeless tobacco.    Patient Instructions     Preventive Health Recommendations  Female Ages 40 to 49    Yearly exam:     See your health care " provider every year in order to  1. Review health changes.   2. Discuss preventive care.    3. Review your medicines if your doctor prescribed any.      Get a Pap test every three years (unless you have an abnormal result and your provider advises testing more often).      If you get Pap tests with HPV test, you only need to test every 5 years, unless you have an abnormal result. You do not need a Pap test if your uterus was removed (hysterectomy) and you have not had cancer.      You should be tested each year for STDs (sexually transmitted diseases), if you're at risk.     Ask your doctor if you should have a mammogram.      Have a colonoscopy (test for colon cancer) if someone in your family has had colon cancer or polyps before age 50.       Have a cholesterol test every 5 years.       Have a diabetes test (fasting glucose) after age 45. If you are at risk for diabetes, you should have this test every 3 years.    Shots: Get a flu shot each year. Get a tetanus shot every 10 years.     Nutrition:     Eat at least 5 servings of fruits and vegetables each day.    Eat whole-grain bread, whole-wheat pasta and brown rice instead of white grains and rice.    Get adequate Calcium and Vitamin D.      Lifestyle    Exercise at least 150 minutes a week (an average of 30 minutes a day, 5 days a week). This will help you control your weight and prevent disease.    Limit alcohol to one drink per day.    No smoking.     Wear sunscreen to prevent skin cancer.    See your dentist every six months for an exam and cleaning.      Counseling Resources:  ATP IV Guidelines  Pooled Cohorts Equation Calculator  Breast Cancer Risk Calculator  FRAX Risk Assessment  ICSI Preventive Guidelines  Dietary Guidelines for Americans, 2010  USDA's MyPlate  ASA Prophylaxis  Lung CA Screening    Claudia Mitchell MD  [facility]

## 2019-06-28 NOTE — LETTER
July 8, 2019    Nilsa Vazquez  [address] [place] [id number]      Dear ,      This letter is in regards to your recent cervical cancer screening (Pap smear and HPV test).    Your Pap smear result was reported as ASCUS or Atypical Squamous Cells of Undetermined Significance. This means that there were mildly abnormal cells found in the sample that we collected from your cervix. The vast majority of patients with this result do not have significant cervical abnormalities.     Your cervical sample was also tested for the presence of Human Papillomavirus (HPV). Your HPV test is NEGATIVE for high risk HPV, meaning that no HPV was found at this time.     Over time, your body can get rid of these abnormal cells, so it is recommended that you repeat your Pap smear and HPV test in 3 years.    If you have additional questions regarding this result, please call our registered nurse, Gabriela at [phone].    Please continue to be seen every year for an annual physical exam and other preventative tests.         Sincerely,    Claudia Mitchell MD/charlene

## 2019-07-05 LAB
COPATH REPORT: ABNORMAL
PAP: ABNORMAL

## 2019-07-08 PROBLEM — R87.610 ASCUS OF CERVIX WITH NEGATIVE HIGH RISK HPV: Status: ACTIVE | Noted: 2019-06-28

## 2019-07-08 LAB
FINAL DIAGNOSIS: NORMAL
HPV HR 12 DNA CVX QL NAA+PROBE: NEGATIVE
HPV16 DNA SPEC QL NAA+PROBE: NEGATIVE
HPV18 DNA SPEC QL NAA+PROBE: NEGATIVE
SPECIMEN DESCRIPTION: NORMAL
SPECIMEN SOURCE CVX/VAG CYTO: NORMAL

## 2019-07-11 ENCOUNTER — ALLIED HEALTH/NURSE VISIT (OUTPATIENT)
Dept: FAMILY MEDICINE | Facility: CLINIC | Age: 49
End: 2019-07-11
Payer: COMMERCIAL

## 2019-07-11 VITALS — DIASTOLIC BLOOD PRESSURE: 82 MMHG | SYSTOLIC BLOOD PRESSURE: 128 MMHG | RESPIRATION RATE: 16 BRPM | HEART RATE: 80 BPM

## 2019-07-11 DIAGNOSIS — I10 ESSENTIAL HYPERTENSION, BENIGN: ICD-10-CM

## 2019-07-11 DIAGNOSIS — Z01.30 BLOOD PRESSURE CHECK: Primary | ICD-10-CM

## 2019-07-11 LAB
ALBUMIN SERPL-MCNC: 3.4 G/DL (ref 3.4–5)
ALP SERPL-CCNC: 97 U/L (ref 40–150)
ALT SERPL W P-5'-P-CCNC: 27 U/L (ref 0–50)
ANION GAP SERPL CALCULATED.3IONS-SCNC: 8 MMOL/L (ref 3–14)
AST SERPL W P-5'-P-CCNC: 13 U/L (ref 0–45)
BASOPHILS # BLD AUTO: 0.1 10E9/L (ref 0–0.2)
BASOPHILS NFR BLD AUTO: 0.5 %
BILIRUB DIRECT SERPL-MCNC: 0.2 MG/DL (ref 0–0.2)
BILIRUB SERPL-MCNC: 0.6 MG/DL (ref 0.2–1.3)
BUN SERPL-MCNC: 15 MG/DL (ref 7–30)
CALCIUM SERPL-MCNC: 8.8 MG/DL (ref 8.5–10.1)
CHLORIDE SERPL-SCNC: 105 MMOL/L (ref 94–109)
CHOLEST SERPL-MCNC: 175 MG/DL
CO2 SERPL-SCNC: 25 MMOL/L (ref 20–32)
CREAT SERPL-MCNC: 0.66 MG/DL (ref 0.52–1.04)
DIFFERENTIAL METHOD BLD: NORMAL
EOSINOPHIL # BLD AUTO: 0.2 10E9/L (ref 0–0.7)
EOSINOPHIL NFR BLD AUTO: 1.7 %
ERYTHROCYTE [DISTWIDTH] IN BLOOD BY AUTOMATED COUNT: 13 % (ref 10–15)
GFR SERPL CREATININE-BSD FRML MDRD: >90 ML/MIN/{1.73_M2}
GLUCOSE SERPL-MCNC: 91 MG/DL (ref 70–99)
HCT VFR BLD AUTO: 44 % (ref 35–47)
HDLC SERPL-MCNC: 44 MG/DL
HGB BLD-MCNC: 14.5 G/DL (ref 11.7–15.7)
LDLC SERPL CALC-MCNC: 116 MG/DL
LYMPHOCYTES # BLD AUTO: 2.3 10E9/L (ref 0.8–5.3)
LYMPHOCYTES NFR BLD AUTO: 24.4 %
MCH RBC QN AUTO: 29.2 PG (ref 26.5–33)
MCHC RBC AUTO-ENTMCNC: 33 G/DL (ref 31.5–36.5)
MCV RBC AUTO: 89 FL (ref 78–100)
MONOCYTES # BLD AUTO: 0.7 10E9/L (ref 0–1.3)
MONOCYTES NFR BLD AUTO: 6.9 %
NEUTROPHILS # BLD AUTO: 6.3 10E9/L (ref 1.6–8.3)
NEUTROPHILS NFR BLD AUTO: 66.5 %
NONHDLC SERPL-MCNC: 131 MG/DL
PLATELET # BLD AUTO: 304 10E9/L (ref 150–450)
POTASSIUM SERPL-SCNC: 3.9 MMOL/L (ref 3.4–5.3)
PROT SERPL-MCNC: 7.1 G/DL (ref 6.8–8.8)
RBC # BLD AUTO: 4.96 10E12/L (ref 3.8–5.2)
SODIUM SERPL-SCNC: 138 MMOL/L (ref 133–144)
TRIGL SERPL-MCNC: 74 MG/DL
TSH SERPL DL<=0.005 MIU/L-ACNC: 2.15 MU/L (ref 0.4–4)
WBC # BLD AUTO: 9.4 10E9/L (ref 4–11)

## 2019-07-11 PROCEDURE — 80048 BASIC METABOLIC PNL TOTAL CA: CPT | Performed by: FAMILY MEDICINE

## 2019-07-11 PROCEDURE — 80076 HEPATIC FUNCTION PANEL: CPT | Performed by: FAMILY MEDICINE

## 2019-07-11 PROCEDURE — 85025 COMPLETE CBC W/AUTO DIFF WBC: CPT | Performed by: FAMILY MEDICINE

## 2019-07-11 PROCEDURE — 80061 LIPID PANEL: CPT | Performed by: FAMILY MEDICINE

## 2019-07-11 PROCEDURE — 84443 ASSAY THYROID STIM HORMONE: CPT | Performed by: FAMILY MEDICINE

## 2019-07-11 PROCEDURE — 36415 COLL VENOUS BLD VENIPUNCTURE: CPT | Performed by: FAMILY MEDICINE

## 2019-07-11 PROCEDURE — 99207 ZZC NO CHARGE NURSE ONLY: CPT

## 2019-12-19 DIAGNOSIS — G47.00 PERSISTENT DISORDER OF INITIATING OR MAINTAINING SLEEP: ICD-10-CM

## 2019-12-20 RX ORDER — ZOLPIDEM TARTRATE 5 MG/1
TABLET ORAL
Qty: 30 TABLET | Refills: 5 | Status: SHIPPED | OUTPATIENT
Start: 2019-12-20 | End: 2020-06-16

## 2019-12-20 NOTE — TELEPHONE ENCOUNTER
RX [place] ([place]) reviewed:  reviewed- no concerns    [place] profile:  [email or web address]

## 2019-12-20 NOTE — TELEPHONE ENCOUNTER
Requested Prescriptions   Pending Prescriptions Disp Refills     zolpidem (AMBIEN) 5 MG tablet [Pharmacy Med Name: ZOLPIDEM TARTRATE 5MG TABS] 30 tablet 5     Sig: TAKE ONE TABLET BY MOUTH ONCE DAILY AS NEEDED FOR SLEEP AT BEDTIME   Last Written Prescription Date:  6/28/19  Last Fill Quantity: 30 tab,  # refills: 5   Last office visit: 7/11/2019 with prescribing provider:  Nurse   Future Office Visit:        There is no refill protocol information for this order

## 2020-01-10 ENCOUNTER — TELEPHONE (OUTPATIENT)
Dept: FAMILY MEDICINE | Facility: CLINIC | Age: 50
End: 2020-01-10

## 2020-01-10 ENCOUNTER — DOCUMENTATION ONLY (OUTPATIENT)
Dept: LAB | Facility: CLINIC | Age: 50
End: 2020-01-10

## 2020-01-10 DIAGNOSIS — Z23 ENCOUNTER FOR IMMUNIZATION: Primary | ICD-10-CM

## 2020-01-10 DIAGNOSIS — Z23 ENCOUNTER FOR IMMUNIZATION: ICD-10-CM

## 2020-01-10 PROCEDURE — 86762 RUBELLA ANTIBODY: CPT | Performed by: FAMILY MEDICINE

## 2020-01-10 PROCEDURE — 36415 COLL VENOUS BLD VENIPUNCTURE: CPT | Performed by: FAMILY MEDICINE

## 2020-01-10 NOTE — TELEPHONE ENCOUNTER
Pt works for [employer] and because of the measles outbreak, they are asking employees for proof of a Measles titer showing immunity.  There are no results from previous tests in her chart. She made apt for 4:30 today for lab draw and is asking if Dr Mitchell could put in orders. .

## 2020-01-10 NOTE — PROGRESS NOTES
Patient is coming for labs per Dr. Mitchell, please place future orders or contact patient.  Thanks!

## 2020-01-11 LAB — RUBV IGM SER QL: <0.2 AI (ref 0–0.8)

## 2020-01-14 ENCOUNTER — TELEPHONE (OUTPATIENT)
Dept: FAMILY MEDICINE | Facility: CLINIC | Age: 50
End: 2020-01-14

## 2020-01-14 NOTE — TELEPHONE ENCOUNTER
Panel Management Review      Patient has the following on her problem list:     Hypertension   Last three blood pressure readings:  BP Readings from Last 3 Encounters:   07/11/19 128/82   06/28/19 158/86   04/24/18 132/84     Blood pressure: Passed    HTN Guidelines:  Less than 140/90      Composite cancer screening  Chart review shows that this patient is due/due soon for the following Mammogram and Colonoscopy  Summary:    Patient is due/failing the following:   COLONOSCOPY and MAMMOGRAM    Action needed:   Needs to schedule a mammogram and colonoscopy    Type of outreach:    Phone, spoke to patient.  and gave her the phone number to schedule mammogram    Questions for provider review:    None                                                                                                                                    Deana Louise CMA

## 2020-01-20 ENCOUNTER — ALLIED HEALTH/NURSE VISIT (OUTPATIENT)
Dept: FAMILY MEDICINE | Facility: CLINIC | Age: 50
End: 2020-01-20
Payer: COMMERCIAL

## 2020-01-20 DIAGNOSIS — Z23 NEED FOR VACCINATION: Primary | ICD-10-CM

## 2020-01-20 PROCEDURE — 90707 MMR VACCINE SC: CPT

## 2020-01-20 PROCEDURE — 99207 ZZC NO CHARGE NURSE ONLY: CPT

## 2020-01-20 PROCEDURE — 90472 IMMUNIZATION ADMIN EACH ADD: CPT

## 2020-01-20 PROCEDURE — 90714 TD VACC NO PRESV 7 YRS+ IM: CPT

## 2020-01-20 PROCEDURE — 90471 IMMUNIZATION ADMIN: CPT

## 2020-01-20 NOTE — PROGRESS NOTES
Prior to immunization administration, verified patients identity using patient s name and date of birth. Please see Immunization Activity for additional information.     Screening Questionnaire for Adult Immunization    Are you sick today?   No   Do you have allergies to medications, food, a vaccine component or latex?   No   Have you ever had a serious reaction after receiving a vaccination?   No   Do you have a long-term health problem with heart, lung, kidney, or metabolic disease (e.g., diabetes), asthma, a blood disorder, no spleen, complement component deficiency, a cochlear implant, or a spinal fluid leak?  Are you on long-term aspirin therapy?   No   Do you have cancer, leukemia, HIV/AIDS, or any other immune system problem?   No   Do you have a parent, brother, or sister with an immune system problem?   No   In the past 3 months, have you taken medications that affect  your immune system, such as prednisone, other steroids, or anticancer drugs; drugs for the treatment of rheumatoid arthritis, Crohn s disease, or psoriasis; or have you had radiation treatments?   No   Have you had a seizure, or a brain or other nervous system problem?   No   During the past year, have you received a transfusion of blood or blood    products, or been given immune (gamma) globulin or antiviral drug?   No   For women: Are you pregnant or is there a chance you could become       pregnant during the next month?   No   Have you received any vaccinations in the past 4 weeks?   No     Immunization questionnaire answers were all negative.        Per orders of Dr. Mitchell, injection of Td and MMR given by Andressa Gallardo CMA. Patient instructed to remain in clinic for 15 minutes afterwards, and to report any adverse reaction to me immediately.       Screening performed by Andressa Gallardo CMA on 1/20/2020 at 4:51 PM.

## 2020-02-10 ENCOUNTER — HEALTH MAINTENANCE LETTER (OUTPATIENT)
Age: 50
End: 2020-02-10

## 2020-06-15 DIAGNOSIS — I10 ESSENTIAL HYPERTENSION, BENIGN: ICD-10-CM

## 2020-06-15 DIAGNOSIS — G47.00 PERSISTENT DISORDER OF INITIATING OR MAINTAINING SLEEP: ICD-10-CM

## 2020-06-16 RX ORDER — ZOLPIDEM TARTRATE 5 MG/1
TABLET ORAL
Qty: 30 TABLET | Refills: 5 | Status: SHIPPED | OUTPATIENT
Start: 2020-06-16 | End: 2020-12-08

## 2020-06-16 RX ORDER — LOSARTAN POTASSIUM 50 MG/1
TABLET ORAL
Qty: 90 TABLET | Refills: 0 | Status: SHIPPED | OUTPATIENT
Start: 2020-06-16 | End: 2020-09-18

## 2020-06-16 NOTE — TELEPHONE ENCOUNTER
"Requested Prescriptions   Pending Prescriptions Disp Refills     zolpidem (AMBIEN) 5 MG tablet [Pharmacy Med Name: ZOLPIDEM TARTRATE 5MG TABS] 30 tablet 5     Sig: TAKE ONE TABLET BY MOUTH ONCE DAILY AS NEEDED FOR SLEEP AT BEDTIME       There is no refill protocol information for this order      Signed Prescriptions Disp Refills    losartan (COZAAR) 50 MG tablet 90 tablet 0     Sig: TAKE ONE TABLET BY MOUTH ONCE DAILY       Angiotensin-II Receptors Passed - 6/15/2020  3:55 PM        Passed - Last blood pressure under 140/90 in past 12 months     BP Readings from Last 3 Encounters:   07/11/19 128/82   06/28/19 158/86   04/24/18 132/84                 Passed - Recent (12 mo) or future (30 days) visit within the authorizing provider's specialty     Patient has had an office visit with the authorizing provider or a provider within the authorizing providers department within the previous 12 mos or has a future within next 30 days. See \"Patient Info\" tab in inbasket, or \"Choose Columns\" in Meds & Orders section of the refill encounter.              Passed - Medication is active on med list        Passed - Patient is age 18 or older        Passed - No active pregnancy on record        Passed - Normal serum creatinine on file in past 12 months     Recent Labs   Lab Test 07/11/19  0900   CR 0.66       Ok to refill medication if creatinine is low          Passed - Normal serum potassium on file in past 12 months     Recent Labs   Lab Test 07/11/19  0900   POTASSIUM 3.9                    Passed - No positive pregnancy test in past 12 months           Last Written Prescription Date:  12/20/19  Last Fill Quantity: 30,  # refills: 5   Last office visit: 6/28/2019 with prescribing provider:     Future Office Visit:            "

## 2020-06-23 ENCOUNTER — VIRTUAL VISIT (OUTPATIENT)
Dept: FAMILY MEDICINE | Facility: CLINIC | Age: 50
End: 2020-06-23
Payer: COMMERCIAL

## 2020-06-23 DIAGNOSIS — I10 ESSENTIAL HYPERTENSION, BENIGN: ICD-10-CM

## 2020-06-23 DIAGNOSIS — Z12.31 ENCOUNTER FOR SCREENING MAMMOGRAM FOR BREAST CANCER: ICD-10-CM

## 2020-06-23 DIAGNOSIS — D44.7 GLOMUS JUGULARE TUMOR (H): ICD-10-CM

## 2020-06-23 DIAGNOSIS — E66.01 MORBID OBESITY (H): ICD-10-CM

## 2020-06-23 DIAGNOSIS — G47.00 PERSISTENT DISORDER OF INITIATING OR MAINTAINING SLEEP: Primary | ICD-10-CM

## 2020-06-23 DIAGNOSIS — Z12.11 COLON CANCER SCREENING: ICD-10-CM

## 2020-06-23 PROCEDURE — 99214 OFFICE O/P EST MOD 30 MIN: CPT | Mod: GT | Performed by: FAMILY MEDICINE

## 2020-06-23 NOTE — PROGRESS NOTES
"Nilsa Vazquez is a 49 year old female who is being evaluated via a billable video visit.      The patient has been notified of following:     \"This video visit will be conducted via a call between you and your physician/provider. We have found that certain health care needs can be provided without the need for an in-person physical exam.  This service lets us provide the care you need with a video conversation.  If a prescription is necessary we can send it directly to your pharmacy.  If lab work is needed we can place an order for that and you can then stop by our lab to have the test done at a later time.    Video visits are billed at different rates depending on your insurance coverage.  Please reach out to your insurance provider with any questions.    If during the course of the call the physician/provider feels a video visit is not appropriate, you will not be charged for this service.\"    Patient has given verbal consent for Video visit? Yes    Will anyone else be joining your video visit? No    Subjective     Nilsa Vazquez is a 49 year old female who presents today via video visit for the following health issues:    HPI  Insomnia  ambien is working well   No concerns at all   Has been on this for 10 years       Video Start Time: 740am    Hypertension Follow-up      Do you check your blood pressure regularly outside of the clinic? No     Are you following a low salt diet? Yes    Are your blood pressures ever more than 140 on the top number (systolic) OR more   than 90 on the bottom number (diastolic), for example 140/90? Unsure      GLobus tumor   Doing well still has the neuro deficit in the face   Will see ent this year         Reviewed and updated as needed this visit by Provider  Tobacco  Allergies  Meds  Problems  Med Hx  Surg Hx  Fam Hx         Review of Systems   Constitutional, HEENT, cardiovascular, pulmonary, gi and gu systems are negative, except as otherwise noted.      Objective  " "  There were no vitals taken for this visit.  Estimated body mass index is 34.78 kg/m  as calculated from the following:    Height as of 6/28/19: 1.651 m (5' 5\").    Weight as of 6/28/19: 94.8 kg (209 lb).  Physical Exam     GENERAL: Healthy, alert and no distress  EYES: Eyes grossly normal to inspection.  No discharge or erythema, or obvious scleral/conjunctival abnormalities.  RESP: No audible wheeze, cough, or visible cyanosis.  No visible retractions or increased work of breathing.    SKIN: Visible skin clear. No significant rash, abnormal pigmentation or lesions.  NEURO: facial droop un changed  Mentation and speech appropriate for age.  PSYCH: Mentation appears normal, affect normal/bright, judgement and insight intact, normal speech and appearance well-groomed.      Diagnostic Test Results:  Labs reviewed in Epic        Assessment & Plan     1. Persistent disorder of initiating or maintaining sleep  Stable no change in treatment plan.     2. Morbid obesity (H)  Working on this     3. Glomus jugulare tumor (H)  Will recheck with Surgeon this year     4. Essential hypertension, benign  Stable no change in treatment plan.   - Hepatic panel; Future  - Basic metabolic panel; Future  - CBC with platelets differential; Future  - TSH with free T4 reflex; Future  - Lipid panel reflex to direct LDL Fasting; Future    5. Colon cancer screening    - Fecal colorectal cancer screen (FIT); Future    6. Encounter for screening mammogram for breast cancer    - MA Screen Bilateral w/Jamie; Future     BMI:   Estimated body mass index is 34.78 kg/m  as calculated from the following:    Height as of 6/28/19: 1.651 m (5' 5\").    Weight as of 6/28/19: 94.8 kg (209 lb).   Weight management plan: Discussed healthy diet and exercise guidelines        Patient Instructions   Nayeli   Per our visit     Set up Lab only appt  for fasting blood work     Set up mammogram     Let me know in 6 months when ready for refill " of ambien via [employer] message       Return in about 6 months (around 12/23/2020) for ambien refill .    Claudia Mitchell MD  [facility]      Video-Visit Details    Type of service:  Video Visit    Video End Time:8:04 AM    Originating Location (pt. Location): Home    Distant Location (provider location):  [facility]     Platform used for Video Visit: [employer]    Return in about 6 months (around 12/23/2020) for ambien refill .       Claudia Mitchell MD

## 2020-06-23 NOTE — PATIENT INSTRUCTIONS
Nayeli   Per our visit     Set up Lab only appt  for fasting blood work     Set up mammogram     Let me know in 6 months when ready for refill of ambien via [employer] message

## 2020-09-16 DIAGNOSIS — I10 ESSENTIAL HYPERTENSION, BENIGN: ICD-10-CM

## 2020-09-18 RX ORDER — LOSARTAN POTASSIUM 50 MG/1
TABLET ORAL
Qty: 30 TABLET | Refills: 0 | Status: SHIPPED | OUTPATIENT
Start: 2020-09-18 | End: 2020-10-20

## 2020-09-18 NOTE — TELEPHONE ENCOUNTER
Medication is being filled for 1 time refill only due to:  Patient needs labs Potassium and Creatine. Reminder placed on pharmacy notes. Has orders in place.    BECCA Cartagena

## 2020-10-16 DIAGNOSIS — I10 ESSENTIAL HYPERTENSION, BENIGN: ICD-10-CM

## 2020-10-16 LAB
ALBUMIN SERPL-MCNC: 3.7 G/DL (ref 3.4–5)
ALP SERPL-CCNC: 104 U/L (ref 40–150)
ALT SERPL W P-5'-P-CCNC: 30 U/L (ref 0–50)
ANION GAP SERPL CALCULATED.3IONS-SCNC: 6 MMOL/L (ref 3–14)
AST SERPL W P-5'-P-CCNC: 20 U/L (ref 0–45)
BASOPHILS # BLD AUTO: 0 10E9/L (ref 0–0.2)
BASOPHILS NFR BLD AUTO: 0.2 %
BILIRUB DIRECT SERPL-MCNC: <0.1 MG/DL (ref 0–0.2)
BILIRUB SERPL-MCNC: 0.4 MG/DL (ref 0.2–1.3)
BUN SERPL-MCNC: 12 MG/DL (ref 7–30)
CALCIUM SERPL-MCNC: 9.6 MG/DL (ref 8.5–10.1)
CHLORIDE SERPL-SCNC: 107 MMOL/L (ref 94–109)
CHOLEST SERPL-MCNC: 186 MG/DL
CO2 SERPL-SCNC: 24 MMOL/L (ref 20–32)
CREAT SERPL-MCNC: 0.83 MG/DL (ref 0.52–1.04)
DIFFERENTIAL METHOD BLD: ABNORMAL
EOSINOPHIL # BLD AUTO: 0.2 10E9/L (ref 0–0.7)
EOSINOPHIL NFR BLD AUTO: 0.9 %
ERYTHROCYTE [DISTWIDTH] IN BLOOD BY AUTOMATED COUNT: 12.6 % (ref 10–15)
GFR SERPL CREATININE-BSD FRML MDRD: 81 ML/MIN/{1.73_M2}
GLUCOSE SERPL-MCNC: 92 MG/DL (ref 70–99)
HCT VFR BLD AUTO: 42.3 % (ref 35–47)
HDLC SERPL-MCNC: 45 MG/DL
HGB BLD-MCNC: 14.4 G/DL (ref 11.7–15.7)
LDLC SERPL CALC-MCNC: 115 MG/DL
LYMPHOCYTES # BLD AUTO: 4.1 10E9/L (ref 0.8–5.3)
LYMPHOCYTES NFR BLD AUTO: 23.5 %
MCH RBC QN AUTO: 29.2 PG (ref 26.5–33)
MCHC RBC AUTO-ENTMCNC: 34 G/DL (ref 31.5–36.5)
MCV RBC AUTO: 86 FL (ref 78–100)
MONOCYTES # BLD AUTO: 1.3 10E9/L (ref 0–1.3)
MONOCYTES NFR BLD AUTO: 7.2 %
NEUTROPHILS # BLD AUTO: 11.9 10E9/L (ref 1.6–8.3)
NEUTROPHILS NFR BLD AUTO: 68.2 %
NONHDLC SERPL-MCNC: 141 MG/DL
PLATELET # BLD AUTO: 352 10E9/L (ref 150–450)
POTASSIUM SERPL-SCNC: 3.8 MMOL/L (ref 3.4–5.3)
PROT SERPL-MCNC: 7.8 G/DL (ref 6.8–8.8)
RBC # BLD AUTO: 4.93 10E12/L (ref 3.8–5.2)
SODIUM SERPL-SCNC: 137 MMOL/L (ref 133–144)
TRIGL SERPL-MCNC: 132 MG/DL
TSH SERPL DL<=0.005 MIU/L-ACNC: 1.22 MU/L (ref 0.4–4)
WBC # BLD AUTO: 17.4 10E9/L (ref 4–11)

## 2020-10-16 PROCEDURE — 80048 BASIC METABOLIC PNL TOTAL CA: CPT | Performed by: FAMILY MEDICINE

## 2020-10-16 PROCEDURE — 85025 COMPLETE CBC W/AUTO DIFF WBC: CPT | Performed by: FAMILY MEDICINE

## 2020-10-16 PROCEDURE — 80061 LIPID PANEL: CPT | Performed by: FAMILY MEDICINE

## 2020-10-16 PROCEDURE — 80076 HEPATIC FUNCTION PANEL: CPT | Performed by: FAMILY MEDICINE

## 2020-10-16 PROCEDURE — 84443 ASSAY THYROID STIM HORMONE: CPT | Performed by: FAMILY MEDICINE

## 2020-10-16 PROCEDURE — 36415 COLL VENOUS BLD VENIPUNCTURE: CPT | Performed by: FAMILY MEDICINE

## 2020-10-19 DIAGNOSIS — I10 ESSENTIAL HYPERTENSION, BENIGN: ICD-10-CM

## 2020-10-20 RX ORDER — LOSARTAN POTASSIUM 50 MG/1
50 TABLET ORAL DAILY
Qty: 30 TABLET | Refills: 0 | Status: SHIPPED | OUTPATIENT
Start: 2020-10-20 | End: 2020-11-17

## 2020-11-16 ENCOUNTER — HEALTH MAINTENANCE LETTER (OUTPATIENT)
Age: 50
End: 2020-11-16

## 2020-11-16 DIAGNOSIS — I10 ESSENTIAL HYPERTENSION, BENIGN: ICD-10-CM

## 2020-11-16 NOTE — TELEPHONE ENCOUNTER
Pt was told she was to call in BP readings before next refill of BP med since her last visit was Virtual. She just took it at  Home.   143/87.

## 2020-11-17 RX ORDER — LOSARTAN POTASSIUM 50 MG/1
100 TABLET ORAL DAILY
Qty: 60 TABLET | Refills: 0 | Status: SHIPPED | OUTPATIENT
Start: 2020-11-17 | End: 2020-12-15

## 2020-11-17 NOTE — TELEPHONE ENCOUNTER
Call patient and let her know I changed the dose on her med to 2 tabs daily to better control her BP   She should call with BP in 1-2 weeks after start of new dose to be sure it is working

## 2020-12-08 ENCOUNTER — ALLIED HEALTH/NURSE VISIT (OUTPATIENT)
Dept: FAMILY MEDICINE | Facility: CLINIC | Age: 50
End: 2020-12-08
Payer: COMMERCIAL

## 2020-12-08 VITALS — DIASTOLIC BLOOD PRESSURE: 85 MMHG | SYSTOLIC BLOOD PRESSURE: 137 MMHG

## 2020-12-08 DIAGNOSIS — I10 HYPERTENSION, UNSPECIFIED TYPE: Primary | ICD-10-CM

## 2020-12-08 DIAGNOSIS — G47.00 PERSISTENT DISORDER OF INITIATING OR MAINTAINING SLEEP: ICD-10-CM

## 2020-12-08 PROCEDURE — 99207 PR NO CHARGE LOS: CPT | Performed by: FAMILY MEDICINE

## 2020-12-08 RX ORDER — ZOLPIDEM TARTRATE 5 MG/1
TABLET ORAL
Qty: 30 TABLET | Refills: 5 | Status: SHIPPED | OUTPATIENT
Start: 2020-12-08 | End: 2021-06-11

## 2020-12-14 DIAGNOSIS — I10 ESSENTIAL HYPERTENSION, BENIGN: ICD-10-CM

## 2020-12-15 RX ORDER — LOSARTAN POTASSIUM 50 MG/1
100 TABLET ORAL DAILY
Qty: 180 TABLET | Refills: 1 | Status: SHIPPED | OUTPATIENT
Start: 2020-12-15 | End: 2021-06-11

## 2021-04-03 ENCOUNTER — HEALTH MAINTENANCE LETTER (OUTPATIENT)
Age: 51
End: 2021-04-03

## 2021-06-10 DIAGNOSIS — G47.00 PERSISTENT DISORDER OF INITIATING OR MAINTAINING SLEEP: ICD-10-CM

## 2021-06-10 DIAGNOSIS — I10 ESSENTIAL HYPERTENSION, BENIGN: ICD-10-CM

## 2021-06-11 RX ORDER — LOSARTAN POTASSIUM 50 MG/1
100 TABLET ORAL DAILY
Qty: 30 TABLET | Refills: 0 | Status: SHIPPED | OUTPATIENT
Start: 2021-06-11 | End: 2021-06-23

## 2021-06-11 RX ORDER — ZOLPIDEM TARTRATE 5 MG/1
TABLET ORAL
Qty: 30 TABLET | Refills: 0 | Status: SHIPPED | OUTPATIENT
Start: 2021-06-11 | End: 2021-06-23

## 2021-06-16 ASSESSMENT — ENCOUNTER SYMPTOMS
DIZZINESS: 1
HEADACHES: 1
BREAST MASS: 0

## 2021-06-23 ENCOUNTER — OFFICE VISIT (OUTPATIENT)
Dept: FAMILY MEDICINE | Facility: CLINIC | Age: 51
End: 2021-06-23
Payer: COMMERCIAL

## 2021-06-23 VITALS
DIASTOLIC BLOOD PRESSURE: 82 MMHG | HEART RATE: 76 BPM | WEIGHT: 250 LBS | BODY MASS INDEX: 40.18 KG/M2 | HEIGHT: 66 IN | SYSTOLIC BLOOD PRESSURE: 132 MMHG | TEMPERATURE: 97.8 F | RESPIRATION RATE: 17 BRPM

## 2021-06-23 DIAGNOSIS — Z12.11 COLON CANCER SCREENING: ICD-10-CM

## 2021-06-23 DIAGNOSIS — G47.00 PERSISTENT DISORDER OF INITIATING OR MAINTAINING SLEEP: ICD-10-CM

## 2021-06-23 DIAGNOSIS — Z00.00 ROUTINE GENERAL MEDICAL EXAMINATION AT A HEALTH CARE FACILITY: Primary | ICD-10-CM

## 2021-06-23 DIAGNOSIS — D44.7 GLOMUS JUGULARE TUMOR (H): ICD-10-CM

## 2021-06-23 DIAGNOSIS — E66.01 MORBID OBESITY (H): ICD-10-CM

## 2021-06-23 DIAGNOSIS — Z12.31 ENCOUNTER FOR SCREENING MAMMOGRAM FOR BREAST CANCER: ICD-10-CM

## 2021-06-23 DIAGNOSIS — I10 ESSENTIAL HYPERTENSION, BENIGN: ICD-10-CM

## 2021-06-23 LAB
ALBUMIN SERPL-MCNC: 3.4 G/DL (ref 3.4–5)
ALP SERPL-CCNC: 110 U/L (ref 40–150)
ALT SERPL W P-5'-P-CCNC: 39 U/L (ref 0–50)
ANION GAP SERPL CALCULATED.3IONS-SCNC: 10 MMOL/L (ref 3–14)
AST SERPL W P-5'-P-CCNC: 19 U/L (ref 0–45)
BASOPHILS # BLD AUTO: 0 10E9/L (ref 0–0.2)
BASOPHILS NFR BLD AUTO: 0.2 %
BILIRUB SERPL-MCNC: 0.5 MG/DL (ref 0.2–1.3)
BUN SERPL-MCNC: 13 MG/DL (ref 7–30)
CALCIUM SERPL-MCNC: 8.6 MG/DL (ref 8.5–10.1)
CHLORIDE SERPL-SCNC: 104 MMOL/L (ref 94–109)
CHOLEST SERPL-MCNC: 180 MG/DL
CO2 SERPL-SCNC: 23 MMOL/L (ref 20–32)
CREAT SERPL-MCNC: 0.75 MG/DL (ref 0.52–1.04)
DIFFERENTIAL METHOD BLD: NORMAL
EOSINOPHIL # BLD AUTO: 0.2 10E9/L (ref 0–0.7)
EOSINOPHIL NFR BLD AUTO: 1.6 %
ERYTHROCYTE [DISTWIDTH] IN BLOOD BY AUTOMATED COUNT: 13 % (ref 10–15)
GFR SERPL CREATININE-BSD FRML MDRD: >90 ML/MIN/{1.73_M2}
GLUCOSE SERPL-MCNC: 109 MG/DL (ref 70–99)
HCT VFR BLD AUTO: 41.2 % (ref 35–47)
HDLC SERPL-MCNC: 42 MG/DL
HGB BLD-MCNC: 14.2 G/DL (ref 11.7–15.7)
LDLC SERPL CALC-MCNC: 113 MG/DL
LYMPHOCYTES # BLD AUTO: 2.6 10E9/L (ref 0.8–5.3)
LYMPHOCYTES NFR BLD AUTO: 23.9 %
MCH RBC QN AUTO: 29.2 PG (ref 26.5–33)
MCHC RBC AUTO-ENTMCNC: 34.5 G/DL (ref 31.5–36.5)
MCV RBC AUTO: 85 FL (ref 78–100)
MONOCYTES # BLD AUTO: 0.8 10E9/L (ref 0–1.3)
MONOCYTES NFR BLD AUTO: 7.4 %
NEUTROPHILS # BLD AUTO: 7.2 10E9/L (ref 1.6–8.3)
NEUTROPHILS NFR BLD AUTO: 66.9 %
NONHDLC SERPL-MCNC: 138 MG/DL
PLATELET # BLD AUTO: 313 10E9/L (ref 150–450)
POTASSIUM SERPL-SCNC: 4.1 MMOL/L (ref 3.4–5.3)
PROT SERPL-MCNC: 7.5 G/DL (ref 6.8–8.8)
RBC # BLD AUTO: 4.86 10E12/L (ref 3.8–5.2)
SODIUM SERPL-SCNC: 137 MMOL/L (ref 133–144)
TRIGL SERPL-MCNC: 124 MG/DL
TSH SERPL DL<=0.005 MIU/L-ACNC: 1.65 MU/L (ref 0.4–4)
WBC # BLD AUTO: 10.8 10E9/L (ref 4–11)

## 2021-06-23 PROCEDURE — 99214 OFFICE O/P EST MOD 30 MIN: CPT | Mod: 25 | Performed by: FAMILY MEDICINE

## 2021-06-23 PROCEDURE — 80050 GENERAL HEALTH PANEL: CPT | Performed by: FAMILY MEDICINE

## 2021-06-23 PROCEDURE — 80061 LIPID PANEL: CPT | Performed by: FAMILY MEDICINE

## 2021-06-23 PROCEDURE — 99396 PREV VISIT EST AGE 40-64: CPT | Performed by: FAMILY MEDICINE

## 2021-06-23 PROCEDURE — 83036 HEMOGLOBIN GLYCOSYLATED A1C: CPT | Performed by: FAMILY MEDICINE

## 2021-06-23 PROCEDURE — 36415 COLL VENOUS BLD VENIPUNCTURE: CPT | Performed by: FAMILY MEDICINE

## 2021-06-23 RX ORDER — LOSARTAN POTASSIUM 50 MG/1
100 TABLET ORAL DAILY
Qty: 90 TABLET | Refills: 3 | Status: SHIPPED | OUTPATIENT
Start: 2021-06-23 | End: 2021-12-28

## 2021-06-23 RX ORDER — ZOLPIDEM TARTRATE 5 MG/1
TABLET ORAL
Qty: 30 TABLET | Refills: 5 | Status: SHIPPED | OUTPATIENT
Start: 2021-06-23 | End: 2022-01-06

## 2021-06-23 RX ORDER — ZOLPIDEM TARTRATE 5 MG/1
TABLET ORAL
Qty: 180 TABLET | Refills: 3 | Status: CANCELLED | OUTPATIENT
Start: 2021-06-23

## 2021-06-23 ASSESSMENT — ENCOUNTER SYMPTOMS
DIZZINESS: 1
BREAST MASS: 0
HEADACHES: 1

## 2021-06-23 ASSESSMENT — MIFFLIN-ST. JEOR: SCORE: 1757.8

## 2021-06-23 ASSESSMENT — PAIN SCALES - GENERAL: PAINLEVEL: NO PAIN (0)

## 2021-06-23 NOTE — LETTER
June 25, 2021      Nilsa Vazquez  [address] [place] [id number]        Dear ,    We are writing to inform you of your test results.    Labs look good   Let me know if questions     Resulted Orders   Comprehensive metabolic panel   Result Value Ref Range    Sodium 137 133 - 144 mmol/L    Potassium 4.1 3.4 - 5.3 mmol/L    Chloride 104 94 - 109 mmol/L    Carbon Dioxide 23 20 - 32 mmol/L    Anion Gap 10 3 - 14 mmol/L    Glucose 109 (H) 70 - 99 mg/dL      Comment:      Fasting specimen    Urea Nitrogen 13 7 - 30 mg/dL    Creatinine 0.75 0.52 - 1.04 mg/dL    GFR Estimate >90 >60 mL/min/[1.73_m2]      Comment:      Non  GFR Calc  Starting 12/18/2018, serum creatinine based estimated GFR (eGFR) will be   calculated using the Chronic Kidney Disease Epidemiology Collaboration   (CKD-EPI) equation.      GFR Estimate If Black >90 >60 mL/min/[1.73_m2]      Comment:       GFR Calc  Starting 12/18/2018, serum creatinine based estimated GFR (eGFR) will be   calculated using the Chronic Kidney Disease Epidemiology Collaboration   (CKD-EPI) equation.      Calcium 8.6 8.5 - 10.1 mg/dL    Bilirubin Total 0.5 0.2 - 1.3 mg/dL    Albumin 3.4 3.4 - 5.0 g/dL    Protein Total 7.5 6.8 - 8.8 g/dL    Alkaline Phosphatase 110 40 - 150 U/L    ALT 39 0 - 50 U/L    AST 19 0 - 45 U/L   CBC with platelets differential   Result Value Ref Range    WBC 10.8 4.0 - 11.0 10e9/L    RBC Count 4.86 3.8 - 5.2 10e12/L    Hemoglobin 14.2 11.7 - 15.7 g/dL    Hematocrit 41.2 35.0 - 47.0 %    MCV 85 78 - 100 fl    MCH 29.2 26.5 - 33.0 pg    MCHC 34.5 31.5 - 36.5 g/dL    RDW 13.0 10.0 - 15.0 %    Platelet Count 313 150 - 450 10e9/L    % Neutrophils 66.9 %    % Lymphocytes 23.9 %    % Monocytes 7.4 %    % Eosinophils 1.6 %    % Basophils 0.2 %    Absolute Neutrophil 7.2 1.6 - 8.3 10e9/L    Absolute Lymphocytes 2.6 0.8 - 5.3 10e9/L    Absolute Monocytes 0.8 0.0 - 1.3 10e9/L    Absolute Eosinophils 0.2 0.0 -  0.7 10e9/L    Absolute Basophils 0.0 0.0 - 0.2 10e9/L    Diff Method Automated Method    TSH with free T4 reflex   Result Value Ref Range    TSH 1.65 0.40 - 4.00 mU/L   Lipid panel reflex to direct LDL Fasting   Result Value Ref Range    Cholesterol 180 <200 mg/dL    Triglycerides 124 <150 mg/dL      Comment:      Fasting specimen    HDL Cholesterol 42 (L) >49 mg/dL    LDL Cholesterol Calculated 113 (H) <100 mg/dL      Comment:      Above desirable:  100-129 mg/dl  Borderline High:  130-159 mg/dL  High:             160-189 mg/dL  Very high:       >189 mg/dl      Non HDL Cholesterol 138 (H) <130 mg/dL      Comment:      Above Desirable:  130-159 mg/dl  Borderline high:  160-189 mg/dl  High:             190-219 mg/dl  Very high:       >219 mg/dl     Hemoglobin A1c   Result Value Ref Range    Hemoglobin A1C 5.3 0 - 5.6 %      Comment:      Normal <5.7% Prediabetes 5.7-6.4%  Diabetes 6.5% or higher - adopted from ADA   consensus guidelines.         If you have any questions or concerns, please call the clinic at the number listed above.       Sincerely,      Claudia Mitchell MD/ ss

## 2021-06-23 NOTE — NURSING NOTE
"Chief Complaint   Patient presents with     Physical       Initial BP (!) 144/88 (BP Location: Right arm, Patient Position: Chair, Cuff Size: Adult Large)   Pulse 76   Temp 97.8  F (36.6  C) (Tympanic)   Resp 17   Ht 1.664 m (5' 5.5\")   Wt 113.4 kg (250 lb)   LMP 05/23/2021 (Within Days)   Breastfeeding No   BMI 40.97 kg/m   Estimated body mass index is 40.97 kg/m  as calculated from the following:    Height as of this encounter: 1.664 m (5' 5.5\").    Weight as of this encounter: 113.4 kg (250 lb).    Patient presents to the clinic using No DME    Health Maintenance that is potentially due pending provider review:  Mammogram and covid 19  colonoscopy  Gave pt phone number/pended order to schedule mammo and/or colonoscopy(or FIT)    Is there anyone who you would like to be able to receive your results? No  If yes have patient fill out [facility]  Kelsea Moore CMA    "

## 2021-06-23 NOTE — PROGRESS NOTES
SUBJECTIVE:   CC: Nilsa Vazquez is an 50 year old woman who presents for preventive health visit.       Patient has been advised of split billing requirements and indicates understanding: Yes  Healthy Habits:     Getting at least 3 servings of Calcium per day:  NO    Bi-annual eye exam:  NO    Dental care twice a year:  NO    Sleep apnea or symptoms of sleep apnea:  None    Diet:  Regular (no restrictions)    Frequency of exercise:  2-3 days/week    Duration of exercise:  15-30 minutes    Taking medications regularly:  Yes    Medication side effects:  None    PHQ-2 Total Score: 0    Additional concerns today:  Yes      Hypertension Follow-up      Do you check your blood pressure regularly outside of the clinic? No     Are you following a low salt diet? No    Are your blood pressures ever more than 140 on the top number (systolic) OR more   than 90 on the bottom number (diastolic), for example 140/90? 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 Yes    Have you ever done Advance Care Planning? (For example, a Health Directive, POLST, or a discussion with a medical provider or your loved ones about your wishes): No, advance care planning information given to patient to review.  Patient plans to discuss their wishes with loved ones or provider.      Social History     Tobacco Use     Smoking status: Former    [phone] (home)                 Smoker     Packs/day: 1.00     Years: 25.00     Pack years: 25.00     Types: Cigarettes     Quit date: 8/1/2005     Years since quitting: 15.9     Smokeless tobacco: Never Used   Substance Use Topics     Alcohol use: Yes     Comment: occ     If you drink alcohol do you typically have >3 drinks per day or >7 drinks per week? No    Alcohol Use 6/23/2021   Prescreen: >3 drinks/day or >7 drinks/week? -   Prescreen: >3 drinks/day or >7 drinks/week? No       Reviewed orders with patient.  Reviewed health maintenance and updated orders accordingly - Yes  Labs reviewed in EPIC    Breast Cancer Screening:    FSH-7:   Breast CA Risk Assessment (FHS-7) 6/16/2021   Did any of your first-degree relatives have breast or ovarian cancer? No   Did any of your relatives have bilateral breast cancer? No   Did any man in your family have breast cancer? No   Did any woman in your family have breast and ovarian cancer? No   Did any woman in your family have breast cancer before age 50 y? No   Do you have 2 or more relatives with breast and/or ovarian cancer? No   Do you have 2 or more relatives with breast and/or bowel cancer? No       Mammogram Screening: Recommended annual mammography  Pertinent mammograms are reviewed under the imaging tab.    History of abnormal Pap smear: NO - age 30-65 PAP every 5 years with negative HPV co-testing recommended  PAP / HPV Latest Ref Rng & Units 6/28/2019 12/2/2014 11/11/2008   PAP - ASC-US(A) NIL NIL   HPV 16 DNA NEG:Negative Negative - -   HPV 18 DNA NEG:Negative Negative - -   OTHER HR HPV NEG:Negative Negative - -     Reviewed and updated as needed this visit by clinical staff  Tobacco  Allergies  Meds  Problems  Med Hx  Surg Hx  Fam Hx  Soc Hx          Reviewed and updated as needed this visit by Provider  Tobacco  Allergies  Meds  Problems  Med Hx  Surg Hx  Fam Hx             Review of Systems   Breasts:  Positive for tenderness. Negative for breast mass and discharge.  "  Genitourinary: Negative for pelvic pain, vaginal bleeding and vaginal discharge.   Neurological: Positive for dizziness and headaches.          OBJECTIVE:   BP (!) 144/88 (BP Location: Right arm, Patient Position: Chair, Cuff Size: Adult Large)   Pulse 76   Temp 97.8  F (36.6  C) (Tympanic)   Resp 17   Ht 1.664 m (5' 5.5\")   Wt 113.4 kg (250 lb)   LMP 05/23/2021 (Within Days)   Breastfeeding No   BMI 40.97 kg/m    Physical Exam  GENERAL APPEARANCE: healthy, alert and no distress  EYES: Eyes grossly normal to inspection, PERRL and conjunctivae and sclerae normal  HENT: ear canals and TM's normal, nose and mouth without ulcers or lesions, oropharynx clear and oral mucous membranes moist  NECK: no adenopathy, no asymmetry, masses, or scars and thyroid normal to palpation  RESP: lungs clear to auscultation - no rales, rhonchi or wheezes  BREAST: normal without masses, tenderness or nipple discharge and no palpable axillary masses or adenopathy  CV: regular rate and rhythm, normal S1 S2, no S3 or S4, no murmur, click or rub, no peripheral edema and peripheral pulses strong  ABDOMEN: soft, nontender, no hepatosplenomegaly, no masses and bowel sounds normal  MS: no musculoskeletal defects are noted and gait is age appropriate without ataxia  SKIN: no suspicious lesions or rashes  NEURO: Normal strength and tone, sensory exam grossly normal, mentation intact, speech normal and facial droop chronic unchanged  PSYCH: mentation appears normal and affect normal/bright    Diagnostic Test Results:  Labs reviewed in Epic    ASSESSMENT/PLAN:   1. Routine general medical examination at a health care facility      2. Essential hypertension, benign  Stable no change in treatment plan.   - losartan (COZAAR) 50 MG tablet; Take 2 tablets (100 mg) by mouth daily  Dispense: 90 tablet; Refill: 3  - Comprehensive metabolic panel  - CBC with platelets differential  - TSH with free T4 reflex  - Lipid panel reflex to direct LDL " "Fasting    3. Persistent disorder of initiating or maintaining sleep  Stable no change in treatment plan.   - zolpidem (AMBIEN) 5 MG tablet; TAKE ONE TABLET BY MOUTH ONCE DAILY AS NEEDED FOR SLEEP AT BEDTIME  Dispense: 30 tablet; Refill: 5    4. Morbid obesity (H)  Working on this     5. Glomus jugulare tumor (H)  Needs to have follow up   - NEUROLOGY ADULT REFERRAL    6. Colon cancer screening    - COLSONYA([employer])    7. Encounter for screening mammogram for breast cancer    - MA Screen Bilateral w/Jamie; Future    Patient has been advised of split billing requirements and indicates understanding: Yes  COUNSELING:  Reviewed preventive health counseling, as reflected in patient instructions    Estimated body mass index is 40.97 kg/m  as calculated from the following:    Height as of this encounter: 1.664 m (5' 5.5\").    Weight as of this encounter: 113.4 kg (250 lb).    Weight management plan: Discussed healthy diet and exercise guidelines    She reports that she quit smoking about 15 years ago. Her smoking use included cigarettes. She has a 25.00 pack-year smoking history. She has never used smokeless tobacco.      Counseling Resources:  ATP IV Guidelines  Pooled Cohorts Equation Calculator  Breast Cancer Risk Calculator  BRCA-Related Cancer Risk Assessment: FHS-7 Tool  FRAX Risk Assessment  ICSI Preventive Guidelines  Dietary Guidelines for Americans, 2010  USDA's MyPlate  ASA Prophylaxis  Lung CA Screening    Claudia Mitchell MD  [facility]  "

## 2021-06-25 LAB — HBA1C MFR BLD: 5.3 % (ref 0–5.6)

## 2021-07-09 LAB — COLOGUARD-ABSTRACT: POSITIVE

## 2021-07-10 ENCOUNTER — ANCILLARY PROCEDURE (OUTPATIENT)
Dept: MAMMOGRAPHY | Facility: CLINIC | Age: 51
End: 2021-07-10
Attending: FAMILY MEDICINE
Payer: COMMERCIAL

## 2021-07-10 DIAGNOSIS — Z12.31 ENCOUNTER FOR SCREENING MAMMOGRAM FOR BREAST CANCER: ICD-10-CM

## 2021-07-10 PROCEDURE — 77063 BREAST TOMOSYNTHESIS BI: CPT | Mod: TC | Performed by: RADIOLOGY

## 2021-07-10 PROCEDURE — 77067 SCR MAMMO BI INCL CAD: CPT | Mod: TC | Performed by: RADIOLOGY

## 2021-07-22 ENCOUNTER — HOSPITAL ENCOUNTER (OUTPATIENT)
Dept: ULTRASOUND IMAGING | Facility: CLINIC | Age: 51
Discharge: HOME | End: 2021-07-22
Attending: FAMILY MEDICINE | Admitting: FAMILY MEDICINE
Payer: COMMERCIAL

## 2021-07-22 DIAGNOSIS — R92.8 ABNORMAL MAMMOGRAM: ICD-10-CM

## 2021-07-22 PROCEDURE — 76642 ULTRASOUND BREAST LIMITED: CPT | Mod: LT

## 2021-07-27 DIAGNOSIS — Z12.11 COLON CANCER SCREENING: Primary | ICD-10-CM

## 2021-08-14 ENCOUNTER — PRE VISIT (OUTPATIENT)
Dept: NEUROLOGY | Facility: CLINIC | Age: 51
End: 2021-08-14

## 2021-08-14 NOTE — TELEPHONE ENCOUNTER
FUTURE VISIT INFORMATION      FUTURE VISIT INFORMATION:    Date: 9/8/2021    Time: 830am    [place] [facility] [place]  REFERRAL INFORMATION:    Referring provider:  Dr. Mitchell    Referring providers clinic:  [facility]    Reason for visit/diagnosis  Glomus Jugulare tumor    RECORDS REQUESTED FROM:       Clinic name Comments Records Status Imaging Status   Internal Dr. Motley-6/23/2021 Epic N/A

## 2021-08-22 DIAGNOSIS — Z11.59 ENCOUNTER FOR SCREENING FOR OTHER VIRAL DISEASES: ICD-10-CM

## 2021-09-05 ASSESSMENT — ENCOUNTER SYMPTOMS
EYE PAIN: 0
SINUS PAIN: 0
NECK PAIN: 1
JOINT SWELLING: 0
DECREASED CONCENTRATION: 1
DEPRESSION: 0
BACK PAIN: 1
HOARSE VOICE: 0
SINUS CONGESTION: 0
INSOMNIA: 1
NERVOUS/ANXIOUS: 0
TROUBLE SWALLOWING: 0
SMELL DISTURBANCE: 0
EYE WATERING: 0
PANIC: 0
ARTHRALGIAS: 1
MYALGIAS: 1
EYE IRRITATION: 0
TASTE DISTURBANCE: 0
EYE REDNESS: 0
STIFFNESS: 0
DOUBLE VISION: 0
MUSCLE WEAKNESS: 0
NECK MASS: 0
SORE THROAT: 0
MUSCLE CRAMPS: 1

## 2021-09-08 ENCOUNTER — OFFICE VISIT (OUTPATIENT)
Dept: NEUROLOGY | Facility: CLINIC | Age: 51
End: 2021-09-08
Payer: COMMERCIAL

## 2021-09-08 VITALS
RESPIRATION RATE: 16 BRPM | DIASTOLIC BLOOD PRESSURE: 97 MMHG | OXYGEN SATURATION: 96 % | SYSTOLIC BLOOD PRESSURE: 172 MMHG | WEIGHT: 252 LBS | HEART RATE: 93 BPM | BODY MASS INDEX: 41.3 KG/M2

## 2021-09-08 DIAGNOSIS — D44.7 GLOMUS JUGULARE TUMOR (H): Primary | ICD-10-CM

## 2021-09-08 PROCEDURE — 99204 OFFICE O/P NEW MOD 45 MIN: CPT | Performed by: PSYCHIATRY & NEUROLOGY

## 2021-09-08 ASSESSMENT — PAIN SCALES - GENERAL: PAINLEVEL: NO PAIN (0)

## 2021-09-08 NOTE — LETTER
9/8/2021       RE: Nilsa Vazquez  [address] [facility] [address]     Dear Colleague,    Thank you for referring your patient, Nilsa Vazquez, to the [facility] NEUROLOGY CLINIC [place] at [facility]. Please see a copy of my visit note below.    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  The patient was followed with Dr. Bueno in 10/3/2006 for ongoing weakness in her tongue on the left side.  MRI revealed an enhancing soft tissue mass in the left jugular foramen region 1.8 x 1.4 x 2.2 cm that displaced the carotid anteriorly and appeared between the ICA and Jugular vein with a small amount extending behind the clivus but no mass effect on the brain noted.  She was evaluated with neurosurgery and had her tumor resected with most recent follow up on 2/10/2009 indicating she had gaudencio sensory complaints and incomplete return of facial nerve function.  Repeat imaging annually was recommended.    The patient reports that she is having a whooshing sound in her head on the left side, headaches, and dizziness.  Most of her symptoms started in the last year.  Prior to her surgery, she did have a similar left ear whooshing sound.  This sound now happens occasionally and sporidially (not with certain movements or positions).  She will wake up with a posterior occipital headache, or have this headache when laying down flat on her back.    Regarding dizziness, she describes prior history of vertigo like sensations (room spinning if her head turned quickly to one side or another).  Now, her dizziness is described as  occurring spontaneously, she overall feels like the room is spinning even when she closes her eyes, which can occur for an hour maximum.      She has not noted changes in her speech, facial paralysis, or spasms of the face.  Many of her facial symptoms are chronic and she doesn't feel these have changed.      Medications:   Benadryl   Losartan  Zolpidem     Physical Exam:   Vitals: BP (!) 172/97   Pulse 93   Resp 16   Wt 114.3 kg (252 lb)   SpO2 96%   BMI 41.30 kg/m     General: Seated comfortably in no acute distress.  HEENT: Neck supple with normal range of motion, old scar is without swelling or redness or pain to palpation. No paracervical muscle tenderness or tightness in trapezius/SCM/anterior scalenes.  HINTS negative today.  Skin: No rashes  Neurologic:     Mental Status: Fully alert, attentive and oriented. Speech clear and fluent, no paraphasic errors, no cerebellar dysarthria.      Cranial Nerves: Visual fields intact to threat in all quadrants. PERRL with afferent defect on either side. EOMI with normal smooth pursuit (no REANNA, no saccadic intrusions, no nystagmus) . Facial asymmetry (limited left eyebrow raise, complete closure of left eye but able to overcome with force easily, tightened facial musculature on left with asymmetric smile and inability to hold air in cheek on left), some degree of synkinesis with left cheek twitching and eyelid twitchign during other facial movements (both left and right), no stimulus induced myoclonus on face. Left facial sensation absent along V3 distribution until chin (no into neck, not posterior auricular, not V2 or V1). Hearing not formally tested but intact to conversation. Palate elevation symmetric, uvula midline. No dysarthria. Shoulder shrug strong bilaterally. Tongue protrusion deviates left, no fasciculations noted.     Motor: No tremors or other abnormal movements observed. Muscle tone normal throughout. No pronator drift. Normal/symmetric rapid finger  tapping. Strength 5/5 throughout upper and lower extremities.     Deep Tendon Reflexes: 2+/symmetric throughout upper and lower extremities. No clonus. Toes downgoing bilaterally.     Sensory: Intact/symmetric to light touch, vibration and proprioception throughout upper and lower extremities. Negative Romberg.      Coordination: Finger-nose-finger intact without dysmetria.      Gait: Normal, steady casual gait.          Data: Pertinent prior to visit   Imaging:  MRI brain 8/4/2009:  FINDINGS: Diffusion-weighted images are normal. There is a residual   enhancing lesion measuring 0.8 x 1.7 cm located in the left jugular   foramen which is stable since 2/10/2009 presumably representing some   residual tumor. There is no evidence for any progressive tumor. The   patient has undergone previous transmastoid left-sided temporal   surgical procedure. There is no evidence to suggest any temporal bone   abscess or any epidural abscess. The left sigmoid and transverse sinus   appear patent. There is some chronic fluid in the left mastoid region.   There is an arachnoid cyst in the quadrigeminal plate cistern which is   also chronic. The brain parenchyma is within normal limits. Ventricles   are normal. Vascular structures are patent at the skull base.    IMPRESSION: No significant change from 2/10/2009. Previous   transmastoid left temporal surgery for left glomus jugulare tumor.   There is a residual enhancing lesion measuring 0.8 x 1.7 cm. This   presumably represents residual tumor.         Assessment and Plan:   Assessment:  Hx of left glomus tumor resection w/subsequent facial deficits    The patient has a relatively stable exam when compared to 2009 and 2008 evaluations, but her new/returning symptoms of whooshing in the left ear, an inner sense of imbalance, and headaches are concerning for a growth/change in her prior resection that could be causing venous or arterial compression.  Her exam today is reassuring that no  urgent/immediate imaging is needed, but certainly she would benefit from repeat imaging along with follow up with her neurosurgery team.  If no intervention is needed, or no new changes are noted on imaging, then the patient could return to clinic for consideration of alternative management for occipital headaches, central imbalance, and atypical vascular auditory changes.     Plan:  MRI brain w/wout  MRA neck w/wout contrast  NSGY referral    Follow up in Neurology clinic should new concerns arise.    HELGA Sears D.O.   of Neurology    Total time today (57 min) in this patient encounter was spent on pre-charting, counseling and/or coordination of care. We reviewed diagnostic results, impressions, and discussed other possible tests if symptoms do not improve. We discussed the implications of the diagnosis, as well as risks and benefits of management options. We reviewed treatment instructions and our scheduled follow-up as specified in the discharge plan. We also discussed the importance of compliance with the chosen course of treatment. The patient is in agreement with this plan and has no further questions.        Again, thank you for allowing me to participate in the care of your patient.      Sincerely,    Severo Sears, DO

## 2021-09-08 NOTE — PROGRESS NOTES
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  The patient was followed with Dr. Bueno in 10/3/2006 for ongoing weakness in her tongue on the left side.  MRI revealed an enhancing soft tissue mass in the left jugular foramen region 1.8 x 1.4 x 2.2 cm that displaced the carotid anteriorly and appeared between the ICA and Jugular vein with a small amount extending behind the clivus but no mass effect on the brain noted.  She was evaluated with neurosurgery and had her tumor resected with most recent follow up on 2/10/2009 indicating she had gaudencio sensory complaints and incomplete return of facial nerve function.  Repeat imaging annually was recommended.    The patient reports that she is having a whooshing sound in her head on the left side, headaches, and dizziness.  Most of her symptoms started in the last year.  Prior to her surgery, she did have a similar left ear whooshing sound.  This sound now happens occasionally and sporidially (not with certain movements or positions).  She will wake up with a posterior occipital headache, or have this headache when laying down flat on her back.    Regarding dizziness, she describes prior history of vertigo like sensations (room spinning if her head turned quickly to one side or another).  Now, her dizziness is described as occurring spontaneously, she overall feels like the room is spinning even when she closes her eyes, which can occur for an hour maximum.      She has not noted changes in her speech, facial paralysis, or spasms of the face.  Many of her facial symptoms are chronic and she doesn't feel these have changed.       Medications:   Benadryl   Losartan  Zolpidem     Physical Exam:   Vitals: BP (!) 172/97   Pulse 93   Resp 16   Wt 114.3 kg (252 lb)   SpO2 96%   BMI 41.30 kg/m     General: Seated comfortably in no acute distress.  HEENT: Neck supple with normal range of motion, old scar is without swelling or redness or pain to palpation. No paracervical muscle tenderness or tightness in trapezius/SCM/anterior scalenes.  HINTS negative today.  Skin: No rashes  Neurologic:     Mental Status: Fully alert, attentive and oriented. Speech clear and fluent, no paraphasic errors, no cerebellar dysarthria.      Cranial Nerves: Visual fields intact to threat in all quadrants. PERRL with afferent defect on either side. EOMI with normal smooth pursuit (no REANNA, no saccadic intrusions, no nystagmus) . Facial asymmetry (limited left eyebrow raise, complete closure of left eye but able to overcome with force easily, tightened facial musculature on left with asymmetric smile and inability to hold air in cheek on left), some degree of synkinesis with left cheek twitching and eyelid twitchign during other facial movements (both left and right), no stimulus induced myoclonus on face. Left facial sensation absent along V3 distribution until chin (no into neck, not posterior auricular, not V2 or V1). Hearing not formally tested but intact to conversation. Palate elevation symmetric, uvula midline. No dysarthria. Shoulder shrug strong bilaterally. Tongue protrusion deviates left, no fasciculations noted.     Motor: No tremors or other abnormal movements observed. Muscle tone normal throughout. No pronator drift. Normal/symmetric rapid finger tapping. Strength 5/5 throughout upper and lower extremities.     Deep Tendon Reflexes: 2+/symmetric throughout upper and lower extremities. No clonus. Toes downgoing bilaterally.     Sensory: Intact/symmetric to light touch, vibration and proprioception throughout upper and lower extremities. Negative Romberg.       Coordination: Finger-nose-finger intact without dysmetria.      Gait: Normal, steady casual gait.          Data: Pertinent prior to visit   Imaging:  MRI brain 8/4/2009:  FINDINGS: Diffusion-weighted images are normal. There is a residual   enhancing lesion measuring 0.8 x 1.7 cm located in the left jugular   foramen which is stable since 2/10/2009 presumably representing some   residual tumor. There is no evidence for any progressive tumor. The   patient has undergone previous transmastoid left-sided temporal   surgical procedure. There is no evidence to suggest any temporal bone   abscess or any epidural abscess. The left sigmoid and transverse sinus   appear patent. There is some chronic fluid in the left mastoid region.   There is an arachnoid cyst in the quadrigeminal plate cistern which is   also chronic. The brain parenchyma is within normal limits. Ventricles   are normal. Vascular structures are patent at the skull base.    IMPRESSION: No significant change from 2/10/2009. Previous   transmastoid left temporal surgery for left glomus jugulare tumor.   There is a residual enhancing lesion measuring 0.8 x 1.7 cm. This   presumably represents residual tumor.         Assessment and Plan:   Assessment:  Hx of left glomus tumor resection w/subsequent facial deficits    The patient has a relatively stable exam when compared to 2009 and 2008 evaluations, but her new/returning symptoms of whooshing in the left ear, an inner sense of imbalance, and headaches are concerning for a growth/change in her prior resection that could be causing venous or arterial compression.  Her exam today is reassuring that no urgent/immediate imaging is needed, but certainly she would benefit from repeat imaging along with follow up with her neurosurgery team.  If no intervention is needed, or no new changes are noted on imaging, then the patient could return to clinic for consideration of alternative management for occipital  headaches, central imbalance, and atypical vascular auditory changes.     Plan:  MRI brain w/wout  MRA neck w/wout contrast  NSGY referral    Follow up in Neurology clinic should new concerns arise.    HELGA Sears D.O.   of Neurology    Total time today (57 min) in this patient encounter was spent on pre-charting, counseling and/or coordination of care. We reviewed diagnostic results, impressions, and discussed other possible tests if symptoms do not improve. We discussed the implications of the diagnosis, as well as risks and benefits of management options. We reviewed treatment instructions and our scheduled follow-up as specified in the discharge plan. We also discussed the importance of compliance with the chosen course of treatment. The patient is in agreement with this plan and has no further questions.

## 2021-09-08 NOTE — PATIENT INSTRUCTIONS
We will repeat imaging to evaluate your glomus tumor resection and refer you into neurosurgery for repeat evaluation.    MRA neck w/wout  MRI brain w/wout

## 2021-09-14 ENCOUNTER — LAB (OUTPATIENT)
Dept: LAB | Facility: CLINIC | Age: 51
End: 2021-09-14
Payer: COMMERCIAL

## 2021-09-14 DIAGNOSIS — Z11.59 ENCOUNTER FOR SCREENING FOR OTHER VIRAL DISEASES: ICD-10-CM

## 2021-09-14 PROCEDURE — U0005 INFEC AGEN DETEC AMPLI PROBE: HCPCS

## 2021-09-14 PROCEDURE — U0003 INFECTIOUS AGENT DETECTION BY NUCLEIC ACID (DNA OR RNA); SEVERE ACUTE RESPIRATORY SYNDROME CORONAVIRUS 2 (SARS-COV-2) (CORONAVIRUS DISEASE [COVID-19]), AMPLIFIED PROBE TECHNIQUE, MAKING USE OF HIGH THROUGHPUT TECHNOLOGIES AS DESCRIBED BY CMS-2020-01-R: HCPCS

## 2021-09-15 LAB — SARS-COV-2 RNA RESP QL NAA+PROBE: NEGATIVE

## 2021-09-16 ENCOUNTER — ANESTHESIA EVENT (OUTPATIENT)
Dept: GASTROENTEROLOGY | Facility: CLINIC | Age: 51
End: 2021-09-16
Payer: COMMERCIAL

## 2021-09-16 ASSESSMENT — LIFESTYLE VARIABLES: TOBACCO_USE: 1

## 2021-09-16 NOTE — ANESTHESIA PREPROCEDURE EVALUATION
Anesthesia Pre-Procedure Evaluation    Patient: Nilsa Vazquez   MRN: [id number] : 1970        Preoperative Diagnosis: Colon cancer screening [Z12.11]   Procedure : Procedure(s):  COLONOSCOPY     Past Medical History:   Diagnosis Date     Abnormal Pap smear of cervix 2019    see problem list      Past Surgical History:   Procedure Laterality Date     ORTHOPEDIC SURGERY       SURGICAL HISTORY OF -    approx    Bilateral feet repair, with pins in place     VASCULAR SURGERY        No Known Allergies   Social History     Tobacco Use     Smoking status: Former Smoker     Packs/day: 1.00     Years: 25.00     Pack years: 25.00     Types: Cigarettes     Quit date: 2005     Years since quittin.1     Smokeless tobacco: Never Used   Substance Use Topics     Alcohol use: Yes     Comment: occ      Wt Readings from Last 1 Encounters:   21 114.3 kg (252 lb)        Anesthesia Evaluation   Pt has had prior anesthetic. Type: General and MAC.    No history of anesthetic complications       ROS/MED HX  ENT/Pulmonary:     (+) tobacco use, Past use,     Neurologic: Comment: GLOSSOPHARYNGEAL NERVE DYSFUNCTION      Cardiovascular:  - neg cardiovascular ROS   (+) hypertension-----    METS/Exercise Tolerance: >4 METS    Hematologic:  - neg hematologic  ROS     Musculoskeletal:  - neg musculoskeletal ROS     GI/Hepatic:  - neg GI/hepatic ROS     Renal/Genitourinary:  - neg Renal ROS     Endo:     (+) Obesity,     Psychiatric/Substance Use:  - neg psychiatric ROS     Infectious Disease:       Malignancy: Comment: Glomus jugulare tumor   (+) Malignancy, History of Other.    Other:  - neg other ROS          Physical Exam    Airway        Mallampati: III   TM distance: > 3 FB   Neck ROM: full   Mouth opening: > 3 cm    Respiratory Devices and Support         Dental       (+) lower dentures and upper dentures      Cardiovascular   cardiovascular exam normal       Rhythm and rate: regular and normal     Pulmonary    pulmonary exam normal        breath sounds clear to auscultation           OUTSIDE LABS:  CBC:   Lab Results   Component Value Date    WBC 10.8 06/23/2021    WBC 17.4 (H) 10/16/2020    HGB 14.2 06/23/2021    HGB 14.4 10/16/2020    HCT 41.2 06/23/2021    HCT 42.3 10/16/2020     06/23/2021     10/16/2020     BMP:   Lab Results   Component Value Date     06/23/2021     10/16/2020    POTASSIUM 4.1 06/23/2021    POTASSIUM 3.8 10/16/2020    CHLORIDE 104 06/23/2021    CHLORIDE 107 10/16/2020    CO2 23 06/23/2021    CO2 24 10/16/2020    BUN 13 06/23/2021    BUN 12 10/16/2020    CR 0.75 06/23/2021    CR 0.83 10/16/2020     (H) 06/23/2021    GLC 92 10/16/2020     COAGS:   Lab Results   Component Value Date    PTT 27 02/06/2007    INR 1.02 02/06/2007     POC:   Lab Results   Component Value Date     (H) 02/08/2007     HEPATIC:   Lab Results   Component Value Date    ALBUMIN 3.4 06/23/2021    PROTTOTAL 7.5 06/23/2021    ALT 39 06/23/2021    AST 19 06/23/2021    ALKPHOS 110 06/23/2021    BILITOTAL 0.5 06/23/2021     OTHER:   Lab Results   Component Value Date    PH 7.44 02/08/2007    A1C 5.3 06/23/2021    STEVE 8.6 06/23/2021    PHOS 2.8 02/10/2007    MAG 2.1 06/21/2011    TSH 1.65 06/23/2021    CRP 13.1 (H) 10/16/2007    SED 32 (H) 10/16/2007       Anesthesia Plan    ASA Status:  3   NPO Status:  Will be NPO Appropriate at ...    Anesthesia Type: General.     - Airway: Native airway   Induction: Propofol.   Maintenance: TIVA.        Consents    Anesthesia Plan(s) and associated risks, benefits, and realistic alternatives discussed. Questions answered and patient/representative(s) expressed understanding.     - Discussed with:  Patient         Postoperative Care    Pain management: Oral pain medications.   PONV prophylaxis: Ondansetron (or other 5HT-3)     Comments:                DIMAS Briones CRNA

## 2021-09-17 ENCOUNTER — ANESTHESIA (OUTPATIENT)
Dept: GASTROENTEROLOGY | Facility: CLINIC | Age: 51
End: 2021-09-17
Payer: COMMERCIAL

## 2021-09-17 ENCOUNTER — HOSPITAL ENCOUNTER (OUTPATIENT)
Facility: CLINIC | Age: 51
Discharge: HOME | End: 2021-09-17
Attending: SURGERY | Admitting: SURGERY
Payer: COMMERCIAL

## 2021-09-17 VITALS
SYSTOLIC BLOOD PRESSURE: 129 MMHG | WEIGHT: 252 LBS | RESPIRATION RATE: 18 BRPM | TEMPERATURE: 99.5 F | HEIGHT: 66 IN | OXYGEN SATURATION: 95 % | BODY MASS INDEX: 40.5 KG/M2 | HEART RATE: 78 BPM | DIASTOLIC BLOOD PRESSURE: 85 MMHG

## 2021-09-17 LAB — COLONOSCOPY: NORMAL

## 2021-09-17 PROCEDURE — 45385 COLONOSCOPY W/LESION REMOVAL: CPT | Performed by: SURGERY

## 2021-09-17 PROCEDURE — 250N000011 HC RX IP 250 OP 636: Performed by: NURSE ANESTHETIST, CERTIFIED REGISTERED

## 2021-09-17 PROCEDURE — 258N000003 HC RX IP 258 OP 636: Performed by: SURGERY

## 2021-09-17 PROCEDURE — 370N000017 HC ANESTHESIA TECHNICAL FEE, PER MIN: Performed by: SURGERY

## 2021-09-17 PROCEDURE — 88305 TISSUE EXAM BY PATHOLOGIST: CPT | Mod: 26 | Performed by: PATHOLOGY

## 2021-09-17 PROCEDURE — 250N000009 HC RX 250: Performed by: NURSE ANESTHETIST, CERTIFIED REGISTERED

## 2021-09-17 PROCEDURE — 250N000009 HC RX 250: Performed by: SURGERY

## 2021-09-17 PROCEDURE — 88305 TISSUE EXAM BY PATHOLOGIST: CPT | Mod: TC | Performed by: SURGERY

## 2021-09-17 RX ORDER — NALOXONE HYDROCHLORIDE 0.4 MG/ML
0.2 INJECTION, SOLUTION INTRAMUSCULAR; INTRAVENOUS; SUBCUTANEOUS
Status: CANCELLED | OUTPATIENT
Start: 2021-09-17

## 2021-09-17 RX ORDER — NALOXONE HYDROCHLORIDE 0.4 MG/ML
0.4 INJECTION, SOLUTION INTRAMUSCULAR; INTRAVENOUS; SUBCUTANEOUS
Status: CANCELLED | OUTPATIENT
Start: 2021-09-17

## 2021-09-17 RX ORDER — SODIUM CHLORIDE, SODIUM LACTATE, POTASSIUM CHLORIDE, CALCIUM CHLORIDE 600; 310; 30; 20 MG/100ML; MG/100ML; MG/100ML; MG/100ML
INJECTION, SOLUTION INTRAVENOUS CONTINUOUS
Status: DISCONTINUED | OUTPATIENT
Start: 2021-09-17 | End: 2021-09-17 | Stop reason: HOSPADM

## 2021-09-17 RX ORDER — LIDOCAINE 40 MG/G
CREAM TOPICAL
Status: DISCONTINUED | OUTPATIENT
Start: 2021-09-17 | End: 2021-09-17 | Stop reason: HOSPADM

## 2021-09-17 RX ORDER — LIDOCAINE HYDROCHLORIDE 10 MG/ML
INJECTION, SOLUTION EPIDURAL; INFILTRATION; INTRACAUDAL; PERINEURAL PRN
Status: DISCONTINUED | OUTPATIENT
Start: 2021-09-17 | End: 2021-09-17

## 2021-09-17 RX ORDER — PROPOFOL 10 MG/ML
INJECTION, EMULSION INTRAVENOUS CONTINUOUS PRN
Status: DISCONTINUED | OUTPATIENT
Start: 2021-09-17 | End: 2021-09-17

## 2021-09-17 RX ORDER — PROPOFOL 10 MG/ML
INJECTION, EMULSION INTRAVENOUS PRN
Status: DISCONTINUED | OUTPATIENT
Start: 2021-09-17 | End: 2021-09-17

## 2021-09-17 RX ORDER — ONDANSETRON 2 MG/ML
4 INJECTION INTRAMUSCULAR; INTRAVENOUS
Status: DISCONTINUED | OUTPATIENT
Start: 2021-09-17 | End: 2021-09-17 | Stop reason: HOSPADM

## 2021-09-17 RX ORDER — FLUMAZENIL 0.1 MG/ML
0.2 INJECTION, SOLUTION INTRAVENOUS
Status: CANCELLED | OUTPATIENT
Start: 2021-09-17 | End: 2021-09-17

## 2021-09-17 RX ADMIN — PROPOFOL 100 MG: 10 INJECTION, EMULSION INTRAVENOUS at 10:15

## 2021-09-17 RX ADMIN — SODIUM CHLORIDE, POTASSIUM CHLORIDE, SODIUM LACTATE AND CALCIUM CHLORIDE: 600; 310; 30; 20 INJECTION, SOLUTION INTRAVENOUS at 10:04

## 2021-09-17 RX ADMIN — LIDOCAINE HYDROCHLORIDE 40 MG: 10 INJECTION, SOLUTION EPIDURAL; INFILTRATION; INTRACAUDAL; PERINEURAL at 10:15

## 2021-09-17 RX ADMIN — LIDOCAINE HYDROCHLORIDE 0.1 ML: 10 INJECTION, SOLUTION EPIDURAL; INFILTRATION; INTRACAUDAL; PERINEURAL at 10:04

## 2021-09-17 RX ADMIN — PROPOFOL 200 MCG/KG/MIN: 10 INJECTION, EMULSION INTRAVENOUS at 10:15

## 2021-09-17 ASSESSMENT — MIFFLIN-ST. JEOR: SCORE: 1774.81

## 2021-09-17 NOTE — ANESTHESIA POSTPROCEDURE EVALUATION
Patient: Nilsa Vazquez    Procedure(s):  COLONOSCOPY, FLEXIBLE, WITH LESION REMOVAL USING SNARE    Diagnosis:Colon cancer screening [Z12.11]  Diagnosis Additional Information: No value filed.    Anesthesia Type:  General    Note:  Disposition: Outpatient   Postop Pain Control: Uneventful            Sign Out: Well controlled pain   PONV: No   Neuro/Psych: Uneventful            Sign Out: Acceptable/Baseline neuro status   Airway/Respiratory: Uneventful            Sign Out: Acceptable/Baseline resp. status   CV/Hemodynamics: Uneventful            Sign Out: Acceptable CV status; No obvious hypovolemia; No obvious fluid overload   Other NRE: NONE   DID A NON-ROUTINE EVENT OCCUR? No           Last vitals:  Vitals Value Taken Time   /53 09/17/21 1037   Temp     Pulse 86 09/17/21 1037   Resp     SpO2 95 % 09/17/21 1040   Vitals shown include unvalidated device data.    Electronically Signed By: Jt Crouch CRNA, APRN CRNA  September 17, 2021  10:40 AM

## 2021-09-17 NOTE — ANESTHESIA CARE TRANSFER NOTE
Patient: Nilsa Vazquez    Procedure(s):  COLONOSCOPY, FLEXIBLE, WITH LESION REMOVAL USING SNARE    Diagnosis: Colon cancer screening [Z12.11]  Diagnosis Additional Information: No value filed.    Anesthesia Type:   General     Note:    Oropharynx: spontaneously breathing and oropharynx clear of all foreign objects  Level of Consciousness: awake  Oxygen Supplementation: nasal cannula    Independent Airway: airway patency satisfactory and stable  Dentition: dentition unchanged  Vital Signs Stable: post-procedure vital signs reviewed and stable  Report to RN Given: handoff report given  Patient transferred to: Phase II    Handoff Report: Identifed the Patient, Identified the Reponsible Provider, Reviewed the pertinent medical history, Discussed the surgical course, Reviewed Intra-OP anesthesia mangement and issues during anesthesia, Set expectations for post-procedure period and Allowed opportunity for questions and acknowledgement of understanding      Vitals:  Vitals Value Taken Time   /53 09/17/21 1037   Temp     Pulse 86 09/17/21 1037   Resp     SpO2 97 % 09/17/21 1039   Vitals shown include unvalidated device data.    Electronically Signed By: Jt Crouch CRNA, APRN CRNA  September 17, 2021  10:40 AM

## 2021-09-17 NOTE — H&P
"50 year old year old female here for colonoscopy for positive cologard.   This is patient's first colonoscopy.  Patient denies blood in stool or change in stool caliber.  Family history of colon cancer in her grandfather.    Patient Active Problem List   Diagnosis     GLOSSOPHAR NERVE DYSFUNCTION     Essential hypertension, benign     Persistent disorder of initiating or maintaining sleep     CARDIOVASCULAR SCREENING; LDL GOAL LESS THAN 160     Glomus jugulare tumor (H)     Morbid obesity (H)     ASCUS of cervix with negative high risk HPV       Past Medical History:   Diagnosis Date     Abnormal Pap smear of cervix 2019    see problem list       Past Surgical History:   Procedure Laterality Date     ORTHOPEDIC SURGERY       SURGICAL HISTORY OF -   1992 approx    Bilateral feet repair, with pins in place     VASCULAR SURGERY         Family History   Problem Relation Age of Onset     Diabetes Mother         type 2     Hypertension Mother      Heart Failure Mother 75     Diabetes Father         type 2     Hypertension Father      Genitourinary Problems Father         kidney stones     Cancer Father 75        bladder cancer     Breast Cancer Maternal Grandmother      Diabetes Maternal Grandmother      Hypertension Maternal Grandmother      Cerebrovascular Disease Maternal Grandmother      Hypertension Brother      Blood Disease Brother         blood clot in leg       No current outpatient medications on file.       No Known Allergies    Pt reports that she quit smoking about 16 years ago. Her smoking use included cigarettes. She has a 25.00 pack-year smoking history. She has never used smokeless tobacco. She reports current alcohol use. She reports that she does not use drugs.    Exam:  BP (!) 150/90 (BP Location: Right arm)   Pulse 89   Temp 99.5  F (37.5  C) (Oral)   Resp 18   Ht 1.676 m (5' 6\")   Wt 114.3 kg (252 lb)   SpO2 98%   BMI 40.67 kg/m      Awake, Alert OX3  Lungs - CTA bilaterally  CV - RRR, no " murmurs, distal pulses intact  Abd - soft, non-distended, non-tender, +BS  Extr - No cyanosis or edema    A/P 50 year old year old female in need of colonoscopy for positive cologuard. Risks, benefits, alternatives, and complications were discussed including the possibility of perforation, bleeding, missed lesion and the patient agreed to proceed    Dmitriy Arana,  on 9/17/2021 at 10:10 AM

## 2021-09-18 ENCOUNTER — HEALTH MAINTENANCE LETTER (OUTPATIENT)
Age: 51
End: 2021-09-18

## 2021-09-21 LAB
PATH REPORT.COMMENTS IMP SPEC: NORMAL
PATH REPORT.COMMENTS IMP SPEC: NORMAL
PATH REPORT.FINAL DX SPEC: NORMAL
PATH REPORT.GROSS SPEC: NORMAL
PATH REPORT.MICROSCOPIC SPEC OTHER STN: NORMAL
PATH REPORT.RELEVANT HX SPEC: NORMAL
PHOTO IMAGE: NORMAL

## 2021-09-29 ENCOUNTER — IMMUNIZATION (OUTPATIENT)
Dept: FAMILY MEDICINE | Facility: CLINIC | Age: 51
End: 2021-09-29
Payer: COMMERCIAL

## 2021-09-29 DIAGNOSIS — Z23 NEED FOR PROPHYLACTIC VACCINATION AND INOCULATION AGAINST INFLUENZA: Primary | ICD-10-CM

## 2021-09-29 PROCEDURE — 99207 PR NO CHARGE NURSE ONLY: CPT

## 2021-09-29 PROCEDURE — 90682 RIV4 VACC RECOMBINANT DNA IM: CPT

## 2021-09-29 PROCEDURE — 90471 IMMUNIZATION ADMIN: CPT

## 2021-10-04 ENCOUNTER — TELEPHONE (OUTPATIENT)
Dept: NEUROSURGERY | Facility: CLINIC | Age: 51
End: 2021-10-04

## 2021-10-04 DIAGNOSIS — D44.7 GLOMUS JUGULARE TUMOR (H): Primary | ICD-10-CM

## 2021-10-04 NOTE — TELEPHONE ENCOUNTER
[facility] Call Center    Phone Message    May a detailed message be left on voicemail: yes     Reason for Call: Appointment Intake    Referring Provider Name: Severo Sears DO in [facility] NEUROLOGY  Diagnosis and/or Symptoms: Glomus jugulare tumor    Nayeli calling to request a call back to harini scheduling options for her referral. Writer transferred her to imaging to set up MRI. Please call Nayeli at your earliest convenience to discuss.     Action Taken: Message routed to:  [facility] ([facility] NEUROSURGERY    Travel Screening: Not Applicable

## 2021-10-06 NOTE — TELEPHONE ENCOUNTER
FUTURE VISIT INFORMATION      FUTURE VISIT INFORMATION:    Date: 11/2/2021    Time: 3:15PM    Location: [facility]  REFERRAL INFORMATION:    Referring provider:      Referring providers clinic:      Reason for visit/diagnosis  Glomus jugulare, Kya/Alvina follow up, MRI and MRA 10/13/21 and audiogram prior    RECORDS REQUESTED FROM:       Clinic name Comments Records Status Imaging Status   Imaging 10/13/2021 scheduled  MR Brain and MRA NEck  [facility] PACS   [facility] [place] 11/17/2009 note from Dr Tinsley  2/11/2007 Transmastoid and neck approach to Glomus jugulare resection.  [facility] [place] 9/8/2021 note from Severo Sears,    [facility]

## 2021-10-06 NOTE — TELEPHONE ENCOUNTER
Reviewed upcoming appointments with patient, including audiogram, MRI and MRA on October 13th and appt with Dr. Cedeno and Dr. Polanco on November 2nd. Patient verbalized understanding. Provided her with writer's direct contact information for any questions.    Addie Nuno, RN  RN Care Coordinator, [facility] Surgery  Direct: [phone]

## 2021-10-13 ENCOUNTER — ANCILLARY PROCEDURE (OUTPATIENT)
Dept: MRI IMAGING | Facility: CLINIC | Age: 51
End: 2021-10-13
Attending: NEUROLOGICAL SURGERY
Payer: COMMERCIAL

## 2021-10-13 ENCOUNTER — ANCILLARY PROCEDURE (OUTPATIENT)
Dept: MRI IMAGING | Facility: CLINIC | Age: 51
End: 2021-10-13
Attending: PSYCHIATRY & NEUROLOGY
Payer: COMMERCIAL

## 2021-10-13 ENCOUNTER — OFFICE VISIT (OUTPATIENT)
Dept: AUDIOLOGY | Facility: CLINIC | Age: 51
End: 2021-10-13
Payer: COMMERCIAL

## 2021-10-13 DIAGNOSIS — D44.7 GLOMUS JUGULARE TUMOR (H): ICD-10-CM

## 2021-10-13 DIAGNOSIS — H91.8X3 ASYMMETRICAL HEARING LOSS: Primary | ICD-10-CM

## 2021-10-13 DIAGNOSIS — H90.12 CONDUCTIVE HEARING LOSS OF LEFT EAR WITH UNRESTRICTED HEARING OF RIGHT EAR: ICD-10-CM

## 2021-10-13 PROCEDURE — 70553 MRI BRAIN STEM W/O & W/DYE: CPT | Performed by: STUDENT IN AN ORGANIZED HEALTH CARE EDUCATION/TRAINING PROGRAM

## 2021-10-13 PROCEDURE — A9585 GADOBUTROL INJECTION: HCPCS | Performed by: STUDENT IN AN ORGANIZED HEALTH CARE EDUCATION/TRAINING PROGRAM

## 2021-10-13 PROCEDURE — 92550 TYMPANOMETRY & REFLEX THRESH: CPT | Mod: 52 | Performed by: AUDIOLOGIST

## 2021-10-13 PROCEDURE — 92557 COMPREHENSIVE HEARING TEST: CPT | Performed by: AUDIOLOGIST

## 2021-10-13 PROCEDURE — 70549 MR ANGIOGRAPH NECK W/O&W/DYE: CPT | Performed by: STUDENT IN AN ORGANIZED HEALTH CARE EDUCATION/TRAINING PROGRAM

## 2021-10-13 RX ORDER — GADOBUTROL 604.72 MG/ML
15 INJECTION INTRAVENOUS ONCE
Status: COMPLETED | OUTPATIENT
Start: 2021-10-13 | End: 2021-10-13

## 2021-10-13 RX ADMIN — GADOBUTROL 11.5 ML: 604.72 INJECTION INTRAVENOUS at 16:13

## 2021-10-13 NOTE — PROGRESS NOTES
AUDIOLOGY REPORT    SUMMARY: Audiology visit completed. See audiogram for results.      RECOMMENDATIONS: Follow-up with ENT.    Jesika Vega. CCC-A  Licensed Audiologist   [place] #[id number]

## 2021-10-31 NOTE — PROGRESS NOTES
"[facility]  Department of Neurosurgery  [facility]    November 2, 2021    Reason for visit: left glomus jugulare s/p resection (Kya/Alvina 2007), new patient visit    Dear Dr. Mitchell,    It was a pleasure to see Ms. Vazquez in the [facility] for [facility] today as a new patient. She was seen in conjunction with my colleague in neurotology, Dr. Renetta Cedeno. As you recall, Ms. 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 In 2007, she underwent resection with Dr. Boland and Dr. Tinsley. She was last seen in 2009. In the past year, she reports having sounds in the left ear that sound like \"whooshing.\" She also has dizziness. She reports left facial weakness since the surgery which improved postop for several years.    Today, the patient reports that she has had some dizziness for the past year. She reports that this is a room spinning sensation that makes her nauseous. The patient states that it particularly bothers her while she works. She reports that Benadryl helps with this. She explained that she gets headaches when she is laying down in the back of her head. The patient states that when she does get headaches, she will close her eyes and try to relax with some relief. She states that she does experience eye watering in her left eye.     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 She currently works at [employer]. She is .     Family history: Noncontributory    Exam:  She is fluent with speech and very pleasant.  Her extraocular movements are intact.  Her face demonstrates asymmetry on the left, rated as a " House-Brackman score of 3 out of 6 on the left side at the forehead but 5/6 in the lower face.  Her shoulders are equal in strength.  Her tongue deviates towards the left side.  She has no pronator drift.  She has full strength in all extremities.    Imaging: We reviewed the results of her MRI from 10/13/2021.  We compared this to prior scans.  There is residual enhancing lesion in the left jugular foramen and jugular tubercle and occipital condyle.  This measures 1.0 x 2.0 cm.  This demonstrates contrast-enhancement consistent with a glomus jugulare. This is larger than in 2009.    Audiogram: We reviewed the results of the audiogram performed 10/13/2021.  This demonstrates severe hearing loss on the left side at all frequencies.  Pure-tone average on the right is 18 dB, and on the left is 78 dB.  Word recognition score on the right is 100% at 60 dB, and on the left is 100% at 100 dB.    Assessment:  1.  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  Class D hearing loss on the left  3.  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  We reviewed the results of the MRI and audiogram as well as options for management including observation, radiosurgery, and repeat resection.  We discussed the relative risks and benefits of each approach.  2.  At this time we recommended she take time to think about the options available to her. We also suggested that she meet with our Radiation Oncologist to talk over radiation and options.   3.  We offered a referral to our facial nerve reanimation specialist Dr. Eric.  4.  Should she wish to pursue radiosurgery, she will let us know after her visit with Radiation Oncology. If she would like to observe, we will plan to see her back in 1 year with an updated MRI and audiogram.  5.  We encouraged her to contact us with any questions or concerns prior to her next visit.    It has been a pleasure to participate in the care of your  patient. Please feel free to contact me if I may be of any assistance for Ms. Vazquez.    Sincerely,  Elijah Polanco MD    Scribe Disclosure:  I, Nohemi Carrion, am serving as a scribe to document services personally performed by Elijah Polanco MD at this visit, based upon the provider's statements to me. All documentation has been reviewed by the aforementioned provider prior to being entered into the official medical record.

## 2021-11-02 ENCOUNTER — OFFICE VISIT (OUTPATIENT)
Dept: OTOLARYNGOLOGY | Facility: CLINIC | Age: 51
End: 2021-11-02
Payer: COMMERCIAL

## 2021-11-02 ENCOUNTER — PRE VISIT (OUTPATIENT)
Dept: OTOLARYNGOLOGY | Facility: CLINIC | Age: 51
End: 2021-11-02

## 2021-11-02 VITALS — WEIGHT: 249 LBS | HEIGHT: 66 IN | TEMPERATURE: 99 F | BODY MASS INDEX: 40.02 KG/M2 | HEART RATE: 98 BPM

## 2021-11-02 VITALS
HEIGHT: 66 IN | OXYGEN SATURATION: 98 % | HEART RATE: 98 BPM | TEMPERATURE: 99 F | BODY MASS INDEX: 40.02 KG/M2 | WEIGHT: 249 LBS

## 2021-11-02 DIAGNOSIS — G51.0 FACIAL PARESIS: ICD-10-CM

## 2021-11-02 DIAGNOSIS — D18.00 GLOMUS TUMOR: Primary | ICD-10-CM

## 2021-11-02 DIAGNOSIS — G43.809 VESTIBULAR MIGRAINE: ICD-10-CM

## 2021-11-02 DIAGNOSIS — D44.7 GLOMUS JUGULARE TUMOR (H): Primary | ICD-10-CM

## 2021-11-02 PROCEDURE — 99204 OFFICE O/P NEW MOD 45 MIN: CPT | Performed by: NEUROLOGICAL SURGERY

## 2021-11-02 PROCEDURE — 92504 EAR MICROSCOPY EXAMINATION: CPT | Mod: GC | Performed by: OTOLARYNGOLOGY

## 2021-11-02 PROCEDURE — 99204 OFFICE O/P NEW MOD 45 MIN: CPT | Mod: 25 | Performed by: OTOLARYNGOLOGY

## 2021-11-02 ASSESSMENT — PAIN SCALES - GENERAL
PAINLEVEL: NO PAIN (0)
PAINLEVEL: NO PAIN (0)

## 2021-11-02 ASSESSMENT — MIFFLIN-ST. JEOR
SCORE: 1761.21
SCORE: 1761.21

## 2021-11-02 NOTE — NURSING NOTE
"Chief Complaint   Patient presents with     Consult     glomus jugulare     Pulse 98, temperature 99  F (37.2  C), height 1.676 m (5' 6\"), weight 112.9 kg (249 lb), SpO2 98 %, not currently breastfeeding.    Ant Bello LPN    "

## 2021-11-02 NOTE — PROGRESS NOTES
[facility]      Name: Nilsa Vazquez  MRN: [id number]  Age: 50 year old  : 1970  Referring provider: No ref. provider found  2021      Chief Complaint:   Consultation     History of Present Illness:   Nilsa Vazquez is a 50 year old female who was seen in the Center [facility] for consultation. She is seen in conjunction with my colleague in neurosurgery, Dr. Elijah Polanco. The patient initially presented with tongue movement, swallowing, and vice abnormalities in . 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 This was resected in  by Dr. Boland and Dr. Tinsley. She was noted to have cholesteatoma in the oversewn left ear canal and underwent revision mastoidectomy and ear canal oversaw with Dr. Tinsley in . She was last seen by Dr. Tinsley in . She was lost to follow-up afterwards due to issues with her insurance coverage. She is referred back again with her new onset dizziness and residual glomus jugulare follow-up.     She reports new dizziness that she describes as room-spinning sensation accompanied by nausea since about a year ago. These episodes last for several hours and better when she closes her eyes and lay down in a quiet place. Bright light especially fluorescent light bothers her. Reports associated blurry vision with these episodes but denies headache. Denies changes in hearing or tinnitus with these episodes. She usually takes benedryl which makes her feel better. She has been experiencing these episodes about 3-4 times a month and they affect her at work. Denies history of migraine headache but endorses having sharp diffuse pain in back of her head when she lays down.She has constant tinnitus in bilateral ears which does not fluctuate. She has maximal conductive hearing loss in left ear since surgery which bothers her sometimes but she is now used to it.     She also has left sided facial paresis  and intermittent twitching. She has not had intervention done for her facial paresis in the past and she is not much bothered by it. Denies changes in voice, swallowing, aspiration, shoulder or tongue weakness. Endorses intermittent left sided parotid swelling which resolves spontaneously.     Review of Systems:   Pertinent items are noted in HPI or as in patient entered ROS below, remainder of complete ROS is negative.    ENT ROS 10/30/2021   Neurology Dizzy spells, Headache   Ears, Nose, Throat Ringing/noise in ears   Musculoskeletal Back pain        Active Medications:     Current Outpatient Medications:      diphenhydrAMINE (BENADRYL) 25 MG capsule, Take 25 mg by mouth every 6 hours as needed for itching or allergies, Disp: , Rfl:      IBUPROFEN 200 MG OR TABS, 2 tablets twice daily for back pain, Disp: , Rfl:      losartan (COZAAR) 50 MG tablet, Take 2 tablets (100 mg) by mouth daily, Disp: 90 tablet, Rfl: 3     zolpidem (AMBIEN) 5 MG tablet, TAKE ONE TABLET BY MOUTH ONCE DAILY AS NEEDED FOR SLEEP AT BEDTIME, Disp: 30 tablet, Rfl: 5      Allergies:   Patient has no known allergies.      Past Medical History:  Past Medical History:   Diagnosis Date     Abnormal Pap smear of cervix 2019    see problem list     Patient Active Problem List   Diagnosis     GLOSSOPHAR NERVE DYSFUNCTION     Essential hypertension, benign     Persistent disorder of initiating or maintaining sleep     CARDIOVASCULAR SCREENING; LDL GOAL LESS THAN 160     Glomus jugulare tumor (H)     Morbid obesity (H)     ASCUS of cervix with negative high risk HPV        Past Surgical History:  Past Surgical History:   Procedure Laterality Date     ORTHOPEDIC SURGERY       SURGICAL HISTORY OF -   1992 approx    Bilateral feet repair, with pins in place     VASCULAR SURGERY         Family History:   Family History   Problem Relation Age of Onset     Diabetes Mother         type 2     Hypertension Mother      Heart Failure Mother 75     Diabetes Father          type 2     Hypertension Father      Genitourinary Problems Father         kidney stones     Cancer Father 75        bladder cancer     Breast Cancer Maternal Grandmother      Diabetes Maternal Grandmother      Hypertension Maternal Grandmother      Cerebrovascular Disease Maternal Grandmother      Hypertension Brother      Blood Disease Brother         blood clot in leg         Social History:   Social History     Tobacco Use     Smoking status: Former Smoker     Packs/day: 1.00     Years: 25.00     Pack years: 25.00     Types: Cigarettes     Quit date: 2005     Years since quittin.2     Smokeless tobacco: Never Used   Substance Use Topics     Alcohol use: Yes     Comment: occ     Drug use: No        Physical Exam:   There were no vitals taken for this visit.     Constitutional:  The patient was unaccompanied, well-groomed, and in no acute distress.     Skin: Normal:  warm and pink without rash   Neurologic: Alert and oriented x 3.  CN's III-XII within normal limits except left facial paresis 3/6 (able to close eye with effort, minimal forehead and lip commissure movement) and left XII weakness.  Voice normal.    Psychiatric: The patient's affect was calm, cooperative, and appropriate.     Communication:  Normal; communicates verbally, normal voice quality.   Respiratory: Breathing comfortably without stridor or exertion of accessory muscles.    Head/Face:  No lesions or scars. No sinus tenderness.    Salivary glands -  Normal size, no tenderness, swelling, or palpable masses.    Eyes: Pupils were equal and reactive.  Extraocular movement intact.     Ears: Pinnae and tragus non-tender.          Otologic microscope exam:  Right ear: Ear canal with partial cerumen impaction, cleaned. TM intact without retraction or effusion.   Left ear: Postauricular/neck incision healed well. Depression of skin over mastoid cavity. Ear canal oversewn, able to visualize scar medially inverted and unable to visualize  the end. No drainage, erythema or tenderness.     Audiogram:  AUDIOGRAM: She underwent an audiogram today. This demonstrated: normal hearing in right ear. Left ear severe conductive hearing loss (maximal). % in left ear.         Right: Speech reception threshold is 60 dB with 100% word recognition.  Left: Speech reception threshold is 110 dB with 100% word recognition.     Audiogram was independently reviewed    Imaging:  MR Brain WWO (10/13/2021)    IMPRESSION: Residual left glomus jugulare tumor in the left jugular foramen and intradurally along the left occipital condyle. It appears that there is millimetric increase in size of the lesion since 2009. Unchanged mild to moderate mass effect in the adjacent left sigmoid sinus/proximal internal jugular vein.    MRA and MRV of the Neck (10/13/2021)    Impression:   1. Relatively hypoplastic left transverse sinus continuing with patent left sigmoid sinus and left internal jugular vein. The junction of the left sigmoid sinus and jugular vein is moderately narrowed, secondary to mass effect from the known mass in the jugular foramen. This appears relatively stable compared with prior MRI from 2/10/2009.  2. Normal neck MRA.    MRI images were independently reviewed. MRI from 10/2021 showed mild increase in residual tumor size from 7.9x17.6mm in 2009 to 10.3x19.6mm.      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 Boland and Dr. Tinsley in 2007  2) left cholesteatoma s/p resection and revision ear canal oversew by Dr. Tinsley in 2009   3) vertigo symptoms consistent with possible vestibular migraine   4) left facial paresis     Recent MRI from 10/2021 in comparison to her prior MRI done in 2009 showed mild increase in residual tumor size. We again reviewed the typical natural history of paraganglioma and that the residual tumor is expected to slowly grow.  Treatment options and all risks and benefits of  each were discussed, including observation, radiosurgery, and surgical resection. She would consider between close follow-up with repeat MRI in 1 year and radiosurgery. We will place a referral to rad onc so she can get more information about radiosurgery before she makes her decision.     We reviewed the goal of radiosurgery being to stop tumor growth rather than to remove the lesion. The risks and side effects of radiosurgery are typically delayed in onset but include facial paralysis, dizziness, hearing loss, and risk to surrounding structures. There is also a small risk of malignant degeneration.     We reviewed possible etiologies of her vertigo. Presentation and associated symptoms of her vertigo are suggestive of vestibular migraine. We discussed that she can do a trial of migraine diet, meet with a headache neurologist for medical management of vestibular migraine and see if her symptoms would improve. We will place a referral to Dr. Eric for further management of her left facial paresis and synkinesis. The patient expressed understanding and is in agreement with this plan.  All questions were answered.    Follow-up:   - referral to rad onc  - referral to Dr. Eric  - migraine diet and referral to neurology   - radiosurgery vs observation with follow up MRI and audio in 1 year    Kavita Lester MD MPH   Fellow Physician  Otology & Neurotology  [facility]     Renetta Cedeno MD  Otology & Neurotology  [facility]    Scribe Preparation Attestation:  Nohemi MCDERMOTT, a scribe, prepared the chart for today's encounter.     Renetta MCDERMOTT MD, saw this patient with the resident/fellow and agree with the resident s findings and plan of care as documented in the resident s/fellow s note. I was present for the entire procedure.

## 2021-11-02 NOTE — PATIENT INSTRUCTIONS
Thank you for choosing [facility]. You were seen in the [facility] today with Dr. Cedeno and Dr. Polanco.    Next steps:  1. Referral to Dr. Eric for facial paralysis. Our scheduling team will call you to set up this appointment.  2. Referral to Dr. Melton for radiosurgery consult. His team will call you to set up this appointment.  3. Referral to Neurology for evaluation of possible vestibular migraines.  4. If you decide not to pursue radiosurgery at this time, we will plan to see you back in 1 year with an MRI and audiogram prior to your appointment.    Please don't hesitate to reach out via MyChart or telephone if you have any questions after your visit.    Addie Nuno MA, RN, PHN, [facility]  RN Care Coordinator, Sk[facility] Surgery    Direct: [phone]  ENT Clinic: [phone]  Neurosurgery Clinic: [phone]

## 2021-11-02 NOTE — Clinical Note
"11/2/2021       RE: Nilsa Vazquez  [address] [place] [id number]     Dear Colleague,    Thank you for referring your patient, Nilsa Vazquez, to the [facility] [place] at [facility]. Please see a copy of my visit note below.    [facility]  Department of Neurosurgery  Center for [facility]    November 2, 2021    Reason for visit: left glomus jugulare s/p resection (Kya/Alvina 2007), new patient visit    Dear Dr. Mitchell,    It was a pleasure to see Ms. Vazquez in the [facility] today as a new patient. She was seen in conjunction with my colleague in neurotology, Dr. Renetta Cedeno. As you recall, Ms. 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 In 2007, she underwent resection with Dr. Boland and Dr. Tinsley. She was last seen in 2009. In the past year, she reports having sounds in the left ear that sound like \"whooshing.\" She also has dizziness. She reports left facial weakness since the surgery which improved postop for several years.    Today, the patient reports that she has had some dizziness for the past year. She reports that this is a room spinning sensation that makes her nauseous. The patient states that it particularly bothers her while she works. She reports that Benadryl helps with this. She explained that she gets headaches when she is laying down in the back of her head. The patient states that when she does get headaches, she will close her eyes and try to relax with some relief. She states that she does experience eye watering in her left eye.     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 She currently works at [employer]. She is .     Family history: Noncontributory    Exam:  She is fluent with speech and very pleasant.  Her extraocular movements are intact.  Her face demonstrates asymmetry on the left, rated as a House-Brackman score of 3 out of 6 on the left side at the forehead but 5/6 in the lower face.  Her shoulders are equal in strength.  Her tongue deviates towards the left side.  She has no pronator drift.  She has full strength in all extremities.    Imaging: We reviewed the results of her MRI from 10/13/2021.  We compared this to prior scans.  There is residual enhancing lesion in the left jugular foramen and jugular tubercle and occipital condyle.  This measures 1.0 x 2.0 cm.  This demonstrates contrast-enhancement consistent with a glomus jugulare. This is larger than in 2009.    Audiogram: We reviewed the results of the audiogram performed 10/13/2021.  This demonstrates severe hearing loss on the left side at all frequencies.  Pure-tone average on the right is 18 dB, and on the left is 78 dB.  Word recognition score on the right is 100% at 60 dB, and on the left is 100% at 100 dB.    Assessment:  1.  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  Class D hearing loss on the left  3.  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  We reviewed the results of the MRI and audiogram as well as options for management including observation, radiosurgery, and repeat resection.  We discussed the relative risks and benefits of each approach.  2.  At this time we recommended she take time to think about the options available to her. We also suggested that she meet with our Radiation Oncologist to talk over radiation and options.   3.  We offered a referral to our facial nerve reanimation specialist Dr. Eric.  4.  Should she wish to pursue  radiosurgery, she will let us know after her visit with Radiation Oncology. If she would like to observe, we will plan to see her back in 1 year with an updated MRI and audiogram.  5.  We encouraged her to contact us with any questions or concerns prior to her next visit.    It has been a pleasure to participate in the care of your patient. Please feel free to contact me if I may be of any assistance for Ms. Vazquez.    Sincerely,  Elijah Polanco MD    Scribe Disclosure:  I, Nohemi Carrion, am serving as a scribe to document services personally performed by Elijah Polanco MD at this visit, based upon the provider's statements to me. All documentation has been reviewed by the aforementioned provider prior to being entered into the official medical record.      Again, thank you for allowing me to participate in the care of your patient.      Sincerely,    Elijah Polanco MD

## 2021-11-02 NOTE — LETTER
2021       RE: Nilsa Vazquez  [address] [facility] [address]     Dear Colleague,    Thank you for referring your patient, Nilsa Vazquez, to the [facility] E[facility] NOSE AND THRO[facility]INIC [place] at [facility]. Please see a copy of my visit note below.      [facility]      Name: Nilsa Vazquez  MRN: [id number]  Age: 50 year old  : 1970  Referring provider: No ref. provider found  2021      Chief Complaint:   Consultation     History of Present Illness:   Nilsa Vazquez is a 50 year old female who was seen in the [facility] for consultation. She is seen in conjunction with my colleague in neurosurgery, Dr. Elijah Polanco. The patient initially presented with tongue movement, swallowing, and vice abnormalities in . 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 This was resected in  by Dr. Boland and Dr. Tinsley. She was noted to have cholesteatoma in the oversewn left ear canal and underwent revision mastoidectomy and ear canal oversaw with Dr. Tinsley in . She was last seen by Dr. Tinsley in . She was lost to follow-up afterwards due to issues with her insurance coverage. She is referred back again with her new onset dizziness and residual glomus jugulare follow-up.     She reports new dizziness that she describes as room-spinning sensation accompanied by nausea since about a year ago. These episodes last for several hours and better when she closes her eyes and lay down in a quiet place. Bright light especially fluorescent light bothers her. Reports associated blurry vision with these episodes but denies headache. Denies changes in hearing or tinnitus with these episodes. She usually takes benedryl which makes her feel better. She has been experiencing these episodes about 3-4 times a month and they affect her at work. Denies history  of migraine headache but endorses having sharp diffuse pain in back of her head when she lays down.She has constant tinnitus in bilateral ears which does not fluctuate. She has maximal conductive hearing loss in left ear since surgery which bothers her sometimes but she is now used to it.     She also has left sided facial paresis and intermittent twitching. She has not had intervention done for her facial paresis in the past and she is not much bothered by it. Denies changes in voice, swallowing, aspiration, shoulder or tongue weakness. Endorses intermittent left sided parotid swelling which resolves spontaneously.     Review of Systems:   Pertinent items are noted in HPI or as in patient entered ROS below, remainder of complete ROS is negative.    ENT ROS 10/30/2021   Neurology Dizzy spells, Headache   Ears, Nose, Throat Ringing/noise in ears   Musculoskeletal Back pain        Active Medications:     Current Outpatient Medications:      diphenhydrAMINE (BENADRYL) 25 MG capsule, Take 25 mg by mouth every 6 hours as needed for itching or allergies, Disp: , Rfl:      IBUPROFEN 200 MG OR TABS, 2 tablets twice daily for back pain, Disp: , Rfl:      losartan (COZAAR) 50 MG tablet, Take 2 tablets (100 mg) by mouth daily, Disp: 90 tablet, Rfl: 3     zolpidem (AMBIEN) 5 MG tablet, TAKE ONE TABLET BY MOUTH ONCE DAILY AS NEEDED FOR SLEEP AT BEDTIME, Disp: 30 tablet, Rfl: 5      Allergies:   Patient has no known allergies.      Past Medical History:  Past Medical History:   Diagnosis Date     Abnormal Pap smear of cervix 2019    see problem list     Patient Active Problem List   Diagnosis     GLOSSOPHAR NERVE DYSFUNCTION     Essential hypertension, benign     Persistent disorder of initiating or maintaining sleep     CARDIOVASCULAR SCREENING; LDL GOAL LESS THAN 160     Glomus jugulare tumor (H)     Morbid obesity (H)     ASCUS of cervix with negative high risk HPV        Past Surgical History:  Past Surgical History:    Procedure Laterality Date     ORTHOPEDIC SURGERY       SURGICAL HISTORY OF -    approx    Bilateral feet repair, with pins in place     VASCULAR SURGERY         Family History:   Family History   Problem Relation Age of Onset     Diabetes Mother         type 2     Hypertension Mother      Heart Failure Mother 75     Diabetes Father         type 2     Hypertension Father      Genitourinary Problems Father         kidney stones     Cancer Father 75        bladder cancer     Breast Cancer Maternal Grandmother      Diabetes Maternal Grandmother      Hypertension Maternal Grandmother      Cerebrovascular Disease Maternal Grandmother      Hypertension Brother      Blood Disease Brother         blood clot in leg         Social History:   Social History     Tobacco Use     Smoking status: Former Smoker     Packs/day: 1.00     Years: 25.00     Pack years: 25.00     Types: Cigarettes     Quit date: 2005     Years since quittin.2     Smokeless tobacco: Never Used   Substance Use Topics     Alcohol use: Yes     Comment: occ     Drug use: No        Physical Exam:   There were no vitals taken for this visit.     Constitutional:  The patient was unaccompanied, well-groomed, and in no acute distress.     Skin: Normal:  warm and pink without rash   Neurologic: Alert and oriented x 3.  CN's III-XII within normal limits except left facial paresis 3/6 (able to close eye with effort, minimal forehead and lip commissure movement) and left XII weakness.  Voice normal.    Psychiatric: The patient's affect was calm, cooperative, and appropriate.     Communication:  Normal; communicates verbally, normal voice quality.   Respiratory: Breathing comfortably without stridor or exertion of accessory muscles.    Head/Face:  No lesions or scars. No sinus tenderness.    Salivary glands -  Normal size, no tenderness, swelling, or palpable masses.    Eyes: Pupils were equal and reactive.  Extraocular movement intact.     Ears: Pinnae  and tragus non-tender.          Otologic microscope exam:  Right ear: Ear canal with partial cerumen impaction, cleaned. TM intact without retraction or effusion.   Left ear: Postauricular/neck incision healed well. Depression of skin over mastoid cavity. Ear canal oversewn, able to visualize scar medially inverted and unable to visualize the end. No drainage, erythema or tenderness.     Audiogram:  AUDIOGRAM: She underwent an audiogram today. This demonstrated: normal hearing in right ear. Left ear severe conductive hearing loss (maximal). % in left ear.         Right: Speech reception threshold is 60 dB with 100% word recognition.  Left: Speech reception threshold is 110 dB with 100% word recognition.     Audiogram was independently reviewed    Imaging:  MR Brain WWO (10/13/2021)    IMPRESSION: Residual left glomus jugulare tumor in the left jugular foramen and intradurally along the left occipital condyle. It appears that there is millimetric increase in size of the lesion since 2009. Unchanged mild to moderate mass effect in the adjacent left sigmoid sinus/proximal internal jugular vein.    MRA and MRV of the Neck (10/13/2021)    Impression:   1. Relatively hypoplastic left transverse sinus continuing with patent left sigmoid sinus and left internal jugular vein. The junction of the left sigmoid sinus and jugular vein is moderately narrowed, secondary to mass effect from the known mass in the jugular foramen. This appears relatively stable compared with prior MRI from 2/10/2009.  2. Normal neck MRA.    MRI images were independently reviewed. MRI from 10/2021 showed mild increase in residual tumor size from 7.9x17.6mm in 2009 to 10.3x19.6mm.      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 Boland and Dr. Tinsley in 2007  2) left cholesteatoma s/p resection and revision ear canal oversew by   Alvina in 2009   3) vertigo symptoms consistent with possible vestibular migraine   4) left facial paresis     Recent MRI from 10/2021 in comparison to her prior MRI done in 2009 showed mild increase in residual tumor size. We again reviewed the typical natural history of paraganglioma and that the residual tumor is expected to slowly grow. Treatment options and all risks and benefits of  each were discussed, including observation, radiosurgery, and surgical resection. She would consider between close follow-up with repeat MRI in 1 year and radiosurgery. We will place a referral to rad onc so she can get more information about radiosurgery before she makes her decision.     We reviewed the goal of radiosurgery being to stop tumor growth rather than to remove the lesion. The risks and side effects of radiosurgery are typically delayed in onset but include facial paralysis, dizziness, hearing loss, and risk to surrounding structures. There is also a small risk of malignant degeneration.     We reviewed possible etiologies of her vertigo. Presentation and associated symptoms of her vertigo are suggestive of vestibular migraine. We discussed that she can do a trial of migraine diet, meet with a headache neurologist for medical management of vestibular migraine and see if her symptoms would improve. We will place a referral to Dr. Eric for further management of her left facial paresis and synkinesis. The patient expressed understanding and is in agreement with this plan.  All questions were answered.    Follow-up:   - referral to rad onc  - referral to Dr. Eric  - migraine diet and referral to neurology   - radiosurgery vs observation with follow up MRI and audio in 1 year    Kavita Lester MD MPH   Fellow Physician  Otology & Neurotology  [facility]     Renetta Cedeno MD  Otology & Neurotology  [facility]    Scribe Preparation Attestation:  Nohemi MCDERMOTT a scribe,  prepared the chart for today's encounter.     I, Renetta Cedeno MD, saw this patient with the resident/fellow and agree with the resident s findings and plan of care as documented in the resident s/fellow s note. I was present for the entire procedure.        Again, thank you for allowing me to participate in the care of your patient.      Sincerely,    Renetta Cedeno MD

## 2021-11-02 NOTE — NURSING NOTE
"Chief Complaint   Patient presents with     Consult     glomus jugulare     Pulse 98, temperature 99  F (37.2  C), height 1.676 m (5' 6\"), weight 112.9 kg (249 lb), not currently breastfeeding.  Ant Bello LPN       "

## 2021-11-02 NOTE — LETTER
"    [facility]  [facility] EAR NOSE AND THROAT [address]  [address] [facility] [address]  Phone: [phone]  Fax: [phone]          11/2/2021    RE:   Nilsa Vazquez  [address] [facility] [address]      Dear Colleague,    Thank you for referring your patient, Nilsa Vazquez, to the [facility]. Please see a copy of my visit note below.      [facility]  Department of Neurosurgery  [place] [facility]    November 2, 2021    Reason for visit: left glomus jugulare s/p resection (Kya/Alvina 2007), new patient visit    Dear Dr. Mitchell,    It was a pleasure to see Ms. Vazquez in the [facility] today as a new patient. She was seen in conjunction with my colleague in neurotology, Dr. Renetta Cedeno. As you recall, Ms. 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 In 2007, she underwent resection with Dr. Boland and Dr. Tinsley. She was last seen in 2009. In the past year, she reports having sounds in the left ear that sound like \"whooshing.\" She also has dizziness. She reports left facial weakness since the surgery which improved postop for several years.    Today, the patient reports that she has had some dizziness for the past year. She reports that this is a room spinning sensation that makes her nauseous. The patient states that it particularly bothers her while she works. She reports that Benadryl helps with this. She explained that she gets headaches when she is laying down in the back of her head. The patient states that when she does get headaches, she will close her eyes and try to relax with some relief. She states that she does experience eye watering in her left eye.     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 She currently works at [employer]. She is .     Family history: Noncontributory    Exam:  She is fluent with speech and very pleasant.  Her extraocular movements are intact.  Her face demonstrates asymmetry on the left, rated as a House-Brackman score of 3 out of 6 on the left side at the forehead but 5/6 in the lower face.  Her shoulders are equal in strength.  Her tongue deviates towards the left side.  She has no pronator drift.  She has full strength in all extremities.    Imaging: We reviewed the results of her MRI from 10/13/2021.  We compared this to prior scans.  There is residual enhancing lesion in the left jugular foramen and jugular tubercle and occipital condyle.  This measures 1.0 x 2.0 cm.  This demonstrates contrast-enhancement consistent with a glomus jugulare. This is larger than in 2009.    Audiogram: We reviewed the results of the audiogram performed 10/13/2021.  This demonstrates severe hearing loss on the left side at all frequencies.  Pure-tone average on the right is 18 dB, and on the left is 78 dB.  Word recognition score on the right is 100% at 60 dB, and on the left is 100% at 100 dB.    Assessment:  1.  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  Class D hearing loss on the left  3.  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  We reviewed the results of the MRI and audiogram as well as options for management including observation, radiosurgery, and repeat resection.  We discussed the relative risks and benefits of each approach.  2.  At this time we recommended she take time to think about the options available to her. We also suggested that she meet with our Radiation Oncologist to talk over  radiation and options.   3.  We offered a referral to our facial nerve reanimation specialist Dr. Eric.  4.  Should she wish to pursue radiosurgery, she will let us know after her visit with Radiation Oncology. If she would like to observe, we will plan to see her back in 1 year with an updated MRI and audiogram.  5.  We encouraged her to contact us with any questions or concerns prior to her next visit.    It has been a pleasure to participate in the care of your patient. Please feel free to contact me if I may be of any assistance for Ms. Vazquez.    Sincerely,  Elijah Polanco MD    Scribe Disclosure:  I, Nohemi Carrion, am serving as a scribe to document services personally performed by Elijah Polanco MD at this visit, based upon the provider's statements to me. All documentation has been reviewed by the aforementioned provider prior to being entered into the official medical record.        Again, thank you for allowing me to participate in the care of your patient.      Sincerely,    Elijah Polanco MD

## 2021-11-16 ENCOUNTER — PRE VISIT (OUTPATIENT)
Dept: RADIATION ONCOLOGY | Facility: CLINIC | Age: 51
End: 2021-11-16
Payer: COMMERCIAL

## 2021-11-16 NOTE — NURSING NOTE
"Date: 2021   Age: 51 year old  Ethnicity:    Sex: female  : 1970   Lives In: [place], [place]    Diagnosis: left paraganglioma    Prior radiation therapy:   Site Treated: at  Facility: at  Dates: at  Dose: at    Prior chemotherapy:   Protocol: at  Facility: at  Dates: at    Pain at time of consult?  Does patient have a living will?  Is patient pregnant?  Does patient have an implanted cardiac device?    RN time with patient:  Educated on gamma knife?    Fall Screen:  Have you fallen in the past week?   Have you felt unsteady when walking or standing in the past week?     Physicians: Dr. Claudia Mitchell (PCP);  Dr Elijah Polanco; Dr. Renetta Cedeno    Review Since Diagnosis:    Patient developed twelfth nerve palsy. Symptoms included tongue movement, swallowing and voice abnormalities. 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     07: surgical resection of tumor by Dr. Boland and Dr. Tinsley    08: Brain MRI. Stable enhancing lesion just posterior to her carotid artery in the petrous apex which is likely residual glomus tumor. This is unchanged from prior MRIs.     08: visit with Dr. Tinsley. Review of brain MRI. The patient is doing well. Lesion remains stable. Plan for return visit in one year.     2/10/09: visit with Dr. Boland. Scan shows no evidence of residual or recurrent tumor. Plan to see her in one year.     10/13/21 brain MRI. Residual enhancing lesion in the left jugular foramen and jugular tubercle and occipital condyle. Measures 1.0 x 2.0 cm.     10/13/21: Audiogram. Demonstrates severe hearing loss on the left side at all frequencies.     21: visit with Dr. Polanco and Dr. Cedeno. Reports having sounds in the left ear that sound like \"whooshing.\" She also has dizziness; room spinning sensation that makes her nauseous.Headaches when laying down on the back of her head. She reports left facial weakness since the surgery which improved postop for several " years. Review of images and audiogram. Follow-up plans reviewed to include observation, radiosurgery and repeat resection. Plan is to meet with radiation oncologist and then determine next steps.     Chief Complaint: at time of visit

## 2021-11-23 NOTE — PROGRESS NOTES
"   Department of Radiation Oncology  [facility]  [address] [place] [address]  [phone]       Consultation Note    Name: Nilsa Vazquez MRN: [id number]   : 1970   Date of Service: 2021 Referring: Dr. Elijah Polanco / Dr. Renetta Cedeno     Reason for consultation: Left-sided glomus jugulare     History of Present Illness   Ms. Vazquez is a 51 year old female with diagnosis of a left-sided glomus jugulare who underwent surgical resection in . She did well post-operatively and had stable follow-up imaging for several years and was last seen in . She is also s/p left cholesteatoma resection in .     More recently, she presented to neurology 21 with increased symptoms of \"whooshing sound\" on the left side, headaches, and dizziness. These symptoms started less than 1 year ago. Physical exam was notable for facial asymmetry due to limited motion on the left, absent left V3 sensation, and a tongue deviation to the left; these findings were felt to be stable in comparison to 2009 evaluations.     MR brain 10/13/21 demonstrated a slightly increased left glomus jugulare tumor measuring approximately 2.1 x 2.3 cm in the coronal plane (previously 1.6 x 1.9 cm in ). Audiogram 10/13/21 demonstrated severe hearing loss on the left.     Today, she presents to clinic to discuss radiation therapy. She reports intermittent dizziness (room spinning) / headaches that is new within the past year. Otherwise, she has not noticed any new focal weakness. She has had hearing loss on the left since the previous surgery.     Past Medical History:    Left glomus jugulare    Hypertension    ASCUS of cervix    Obesity    Past Surgical History:    Left glomus jugulare resection     Left cholesteatoma resection in     Foot surgery     Chemotherapy History:  None    Radiation History:  None    Pregnant: No  Implanted Cardiac Devices: " "No    Medications:    Losartan    Benadryl    Zolpidem    Allergies:    No known drug allergies    Social History:  Tobacco: Former smoker. Quit 2005  Alcohol: Occasional use  Employment: [employer]    Family History:    Mother: T2DM, hypertension, heart failure    Father: T2DM, hypertension bladder cancer    Review of Systems   A 10-point review of systems was performed. Pertinent positives include:     Dizziness    Physical Exam     BP (!) 174/87   Pulse 83   Resp 20   Ht 1.651 m (5' 5\")   Wt 112.9 kg (249 lb)   SpO2 98%   BMI 41.44 kg/m      General: Appears healthy, sitting comfortably in chair with no acute distress  Respiratory: No wheezing, breathing comfortably on room air  Neuro: Fluent speech, alert and oriented x3, normal strength in upper and lower extremities  Cranial Nerve Exam  I: Not tested  II: Not tested  III/IV/VI: PERRL. EOMI.   V: Sensation to light touch is asymmetric with decreased sensation along left V3  VII: Facial asymmetry with weakness on the left  VIII: Hearing is grossly intact but decreased on the left.  IX/X: Palate elevates symmetrically. Normal phonation.  XI: Strength is 5/5 in bilateral trapezius and SCM musculature.  XII: Tongue deviation to the left. No atrophy or fasciculations.      Imaging/Path/Labs   Imaging: Reviewed    Path: Reviewed    Labs: Reviewed    Assessment    Ms. Vazquez is a 51 year old female with a left-sided glomus jugulare s/p surgical resection in 2007 who is now presenting with new dizziness and relatively stable imaging of the glomus tumor.     Plan   We discussed the natural history of glomus tumors and management strategies with observation vs radiation vs surgery.    We reviewed the recent MRI brain, which demonstrated a few millimeters of change between 2009 and 2021. Given the small change in size of the glomus tumor over the past 12 years, it is less likely that this is the only cause of the new-onset dizziness symptoms which began <1 year ago, " although it is still a small possibility. Glomus tumors tend to be very slow growing, thus there is time to further explore other etiologies for the new symptoms with neurology.     The logistics, rationale, and potential side effects of Gamma Knife were introduced to Ms. Vazquez, should she decide to pursue radiation in the future. The goal of radiation would be to stop further growth of the tumor. If the glomus tumor was causing the symptoms, then radiation would likely either stabilize or slightly improve the symptoms. At this time, she would like to explore medical management of the dizziness and headaches with neurology. She was given our clinic information and was encouraged to contact us at any time.     Patient was seen and discussed with Dr. Melton.    Nilsa Solis MD PGY3      Attending addendum:   I saw and examined the patient with the resident and agree with the documented plan of care.    Discussed continued observation versus radiosurgery for treatment of her left-sided glomus tumor. Given the fairly small amount of growth over the past decade, I explained that there was no urgent indication to undergo therapy at this time. Ms. Vazquez expressed her interest in continued observation with annual MRI imaging as suggested by Haider Polanco and Wilian and will follow back up in radiation oncology clinic should she change her mind or if repeat imaging demonstrates more rapid progression than has been observed in the past.    Michael Melton MD/PhD    Dept of Radiation Oncology  [facility]

## 2021-11-26 ENCOUNTER — OFFICE VISIT (OUTPATIENT)
Dept: RADIATION ONCOLOGY | Facility: CLINIC | Age: 51
End: 2021-11-26
Attending: OTOLARYNGOLOGY
Payer: COMMERCIAL

## 2021-11-26 VITALS
HEIGHT: 65 IN | DIASTOLIC BLOOD PRESSURE: 87 MMHG | OXYGEN SATURATION: 98 % | BODY MASS INDEX: 41.48 KG/M2 | HEART RATE: 83 BPM | SYSTOLIC BLOOD PRESSURE: 174 MMHG | RESPIRATION RATE: 20 BRPM | WEIGHT: 249 LBS

## 2021-11-26 DIAGNOSIS — D44.7 GLOMUS JUGULARE TUMOR (H): ICD-10-CM

## 2021-11-26 PROCEDURE — G0463 HOSPITAL OUTPT CLINIC VISIT: HCPCS | Performed by: RADIOLOGY

## 2021-11-26 RX ORDER — MULTIVITAMIN,THERAPEUTIC
1 TABLET ORAL DAILY
COMMUNITY

## 2021-11-26 ASSESSMENT — ENCOUNTER SYMPTOMS
HEADACHES: 1
EYES NEGATIVE: 1
RESPIRATORY NEGATIVE: 1
INSOMNIA: 1
NAUSEA: 1
BACK PAIN: 1
CARDIOVASCULAR NEGATIVE: 1
TINGLING: 1
DIZZINESS: 1

## 2021-11-26 ASSESSMENT — MIFFLIN-ST. JEOR: SCORE: 1740.34

## 2021-11-26 NOTE — PROGRESS NOTES
"  HPI  Date: 2021   Age: 51 year old  Ethnicity:    Sex: female  : 1970   Lives In: [place], [place]    Diagnosis: left paraganglioma    Prior radiation therapy:   No    Prior chemotherapy:   No    Pain at time of consult? No  Does patient have a living will? No  Is patient pregnant? No  Does patient have an implanted cardiac device? No    RN time with patient:50 minutes  Educated on gamma knife? Yes    Fall Screen:  Have you fallen in the past week? No  Have you felt unsteady when walking or standing in the past week? Dizziness often; not necessarily every day.     Physicians: Dr. Claudia Mitchell (PCP);  Dr Elijah Polanco; Dr. Renetta Cedeno    Review Since Diagnosis:    Patient developed twelfth nerve palsy. Symptoms included tongue movement, swallowing and voice abnormalities. 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     07: surgical resection of tumor by Dr. Boland and Dr. Tinsley    Post op infection on flap; treated with antibiotics.    @ 2008 per patient they repeated the surgical resection to re-secure.     08: Brain MRI. Stable enhancing lesion just posterior to her carotid artery in the petrous apex which is likely residual glomus tumor. This is unchanged from prior MRIs.     08: visit with Dr. Tinsley. Review of brain MRI. The patient is doing well. Lesion remains stable. Plan for return visit in one year.     2/10/09: visit with Dr. Boland. Scan shows no evidence of residual or recurrent tumor. Plan to see her in one year.     10/13/21 brain MRI. Residual enhancing lesion in the left jugular foramen and jugular tubercle and occipital condyle. Measures 1.0 x 2.0 cm.     10/13/21: Audiogram. Demonstrates severe hearing loss on the left side at all frequencies.     21: visit with Dr. Polanco and Dr. Cedeno. Reports having sounds in the left ear that sound like \"whooshing.\" She also has dizziness; room spinning sensation that makes her nauseous.Headaches " "when laying down on the back of her head. She reports left facial weakness since the surgery which improved postop for several years. Review of images and audiogram. Follow-up plans reviewed to include observation, radiosurgery and repeat resection. Plan is to meet with radiation oncologist and then determine next steps.     Chief Complaint: headaches with nausea and dizziness. Has to set an appointment with migraine MD in January 2022  Review of Systems   Constitutional: Positive for malaise/fatigue.        Low energy; \"literally don't feel good\"   HENT: Positive for hearing loss and tinnitus. Negative for ear pain.         Left ear with hearing loss and tinnitus  Saliva glands on left side; swelling occurs near mid face. She pushes it and feels actual saliva drainage into mouth. This started about 2 months ago. Denies signs of infection.   Eyes: Negative.    Respiratory: Negative.    Cardiovascular: Negative.    Gastrointestinal: Positive for nausea.        Nausea with vertigo   Genitourinary: Negative.    Musculoskeletal: Positive for back pain.        Mid upper back pain; also neck. chronic   Skin: Negative.    Neurological: Positive for dizziness, tingling and headaches.        Numbness/tingling left side of face from ear down.    Endo/Heme/Allergies: Negative.    Psychiatric/Behavioral: The patient has insomnia.                "

## 2021-11-26 NOTE — LETTER
"    2021         RE: Nilsa Vazquez  [address] [facility] [address]         Department of Radiation Oncology  [facility]  [address] [place] [address]  [phone]       Consultation Note    Name: Nilsa Vazquez MRN: [id number]   : 1970   Date of Service: 2021 Referring: Dr. Elijah Polanco / Dr. Renetta Cedeno     Reason for consultation: Left-sided glomus jugulare     History of Present Illness   Ms. Vazquez is a 51 year old female with diagnosis of a left-sided glomus jugulare who underwent surgical resection in . She did well post-operatively and had stable follow-up imaging for several years and was last seen in . She is also s/p left cholesteatoma resection in .     More recently, she presented to neurology 21 with increased symptoms of \"whooshing sound\" on the left side, headaches, and dizziness. These symptoms started less than 1 year ago. Physical exam was notable for facial asymmetry due to limited motion on the left, absent left V3 sensation, and a tongue deviation to the left; these findings were felt to be stable in comparison to 2009 evaluations.     MR brain 10/13/21 demonstrated a slightly increased left glomus jugulare tumor measuring approximately 2.1 x 2.3 cm in the coronal plane (previously 1.6 x 1.9 cm in ). Audiogram 10/13/21 demonstrated severe hearing loss on the left.     Today, she presents to clinic to discuss radiation therapy. She reports intermittent dizziness (room spinning) / headaches that is new within the past year. Otherwise, she has not noticed any new focal weakness. She has had hearing loss on the left since the previous surgery.     Past Medical History:    Left glomus jugulare    Hypertension    ASCUS of cervix    Obesity    Past Surgical History:    Left glomus jugulare resection     Left cholesteatoma resection in     Foot surgery 1992    Chemotherapy " "History:  None    Radiation History:  None    Pregnant: No  Implanted Cardiac Devices: No    Medications:    Losartan    Benadryl    Zolpidem    Allergies:    No known drug allergies    Social History:  Tobacco: Former smoker. Quit 2005  Alcohol: Occasional use  Employment: [employer]    Family History:    Mother: T2DM, hypertension, heart failure    Father: T2DM, hypertension bladder cancer    Review of Systems   A 10-point review of systems was performed. Pertinent positives include:     Dizziness    Physical Exam     BP (!) 174/87   Pulse 83   Resp 20   Ht 1.651 m (5' 5\")   Wt 112.9 kg (249 lb)   SpO2 98%   BMI 41.44 kg/m      General: Appears healthy, sitting comfortably in chair with no acute distress  Respiratory: No wheezing, breathing comfortably on room air  Neuro: Fluent speech, alert and oriented x3, normal strength in upper and lower extremities  Cranial Nerve Exam  I: Not tested  II: Not tested  III/IV/VI: PERRL. EOMI.   V: Sensation to light touch is asymmetric with decreased sensation along left V3  VII: Facial asymmetry with weakness on the left  VIII: Hearing is grossly intact but decreased on the left.  IX/X: Palate elevates symmetrically. Normal phonation.  XI: Strength is 5/5 in bilateral trapezius and SCM musculature.  XII: Tongue deviation to the left. No atrophy or fasciculations.      Imaging/Path/Labs   Imaging: Reviewed    Path: Reviewed    Labs: Reviewed    Assessment    Ms. Vazquez is a 51 year old female with a left-sided glomus jugulare s/p surgical resection in 2007 who is now presenting with new dizziness and relatively stable imaging of the glomus tumor.     Plan   We discussed the natural history of glomus tumors and management strategies with observation vs radiation vs surgery.    We reviewed the recent MRI brain, which demonstrated a few millimeters of change between 2009 and 2021. Given the small change in size of the glomus tumor over the past 12 years, it is less likely " that this is the only cause of the new-onset dizziness symptoms which began <1 year ago, although it is still a small possibility. Glomus tumors tend to be very slow growing, thus there is time to further explore other etiologies for the new symptoms with neurology.     The logistics, rationale, and potential side effects of Gamma Knife were introduced to Ms. Vazquez, should she decide to pursue radiation in the future. The goal of radiation would be to stop further growth of the tumor. If the glomus tumor was causing the symptoms, then radiation would likely either stabilize or slightly improve the symptoms. At this time, she would like to explore medical management of the dizziness and headaches with neurology. She was given our clinic information and was encouraged to contact us at any time.     Patient was seen and discussed with Dr. Melton.    Nilsa Solis MD PGY3      Attending addendum:   I saw and examined the patient with the resident and agree with the documented plan of care.    Discussed continued observation versus radiosurgery for treatment of her left-sided glomus tumor. Given the fairly small amount of growth over the past decade, I explained that there was no urgent indication to undergo therapy at this time. Ms. Vazquez expressed her interest in continued observation with annual MRI imaging as suggested by Haider Polanco and Wilian and will follow back up in radiation oncology clinic should she change her mind or if repeat imaging demonstrates more rapid progression than has been observed in the past.    Michael Melton MD/PhD    Dept of Radiation Oncology  [facility]        HPI  Date: 2021   Age: 51 year old  Ethnicity:    Sex: female  : 1970   Lives In: [place], [place]    Diagnosis: left paraganglioma    Prior radiation therapy:   No    Prior chemotherapy:   No    Pain at time of consult? No  Does patient have a living will? No  Is  "patient pregnant? No  Does patient have an implanted cardiac device? No    RN time with patient:50 minutes  Educated on gamma knife? Yes    Fall Screen:  Have you fallen in the past week? No  Have you felt unsteady when walking or standing in the past week? Dizziness often; not necessarily every day.     Physicians: Dr. Claudia Mitchell (PCP);  Dr Elijah Polanco; Dr. Renetta Cedeno    Review Since Diagnosis:    Patient developed twelfth nerve palsy. Symptoms included tongue movement, swallowing and voice abnormalities. 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     2/12/07: surgical resection of tumor by Dr. Boland and Dr. Tinsley    Post op infection on flap; treated with antibiotics.    @ Feb 2008 per patient they repeated the surgical resection to re-secure.     1/29/08: Brain MRI. Stable enhancing lesion just posterior to her carotid artery in the petrous apex which is likely residual glomus tumor. This is unchanged from prior MRIs.     1/29/08: visit with Dr. Tinsley. Review of brain MRI. The patient is doing well. Lesion remains stable. Plan for return visit in one year.     2/10/09: visit with Dr. Boland. Scan shows no evidence of residual or recurrent tumor. Plan to see her in one year.     10/13/21 brain MRI. Residual enhancing lesion in the left jugular foramen and jugular tubercle and occipital condyle. Measures 1.0 x 2.0 cm.     10/13/21: Audiogram. Demonstrates severe hearing loss on the left side at all frequencies.     11/2/21: visit with Dr. Polanco and Dr. Cedeno. Reports having sounds in the left ear that sound like \"whooshing.\" She also has dizziness; room spinning sensation that makes her nauseous.Headaches when laying down on the back of her head. She reports left facial weakness since the surgery which improved postop for several years. Review of images and audiogram. Follow-up plans reviewed to include observation, radiosurgery and repeat resection. Plan is to meet with radiation " "oncologist and then determine next steps.     Chief Complaint: headaches with nausea and dizziness. Has to set an appointment with migraine MD in January 2022  Review of Systems   Constitutional: Positive for malaise/fatigue.        Low energy; \"literally don't feel good\"   HENT: Positive for hearing loss and tinnitus. Negative for ear pain.         Left ear with hearing loss and tinnitus  Saliva glands on left side; swelling occurs near mid face. She pushes it and feels actual saliva drainage into mouth. This started about 2 months ago. Denies signs of infection.   Eyes: Negative.    Respiratory: Negative.    Cardiovascular: Negative.    Gastrointestinal: Positive for nausea.        Nausea with vertigo   Genitourinary: Negative.    Musculoskeletal: Positive for back pain.        Mid upper back pain; also neck. chronic   Skin: Negative.    Neurological: Positive for dizziness, tingling and headaches.        Numbness/tingling left side of face from ear down.    Endo/Heme/Allergies: Negative.    Psychiatric/Behavioral: The patient has insomnia.            "

## 2021-12-27 DIAGNOSIS — I10 ESSENTIAL HYPERTENSION, BENIGN: ICD-10-CM

## 2021-12-28 RX ORDER — LOSARTAN POTASSIUM 50 MG/1
100 TABLET ORAL DAILY
Qty: 180 TABLET | Refills: 0 | Status: SHIPPED | OUTPATIENT
Start: 2021-12-28 | End: 2022-03-24

## 2022-01-05 DIAGNOSIS — G47.00 PERSISTENT DISORDER OF INITIATING OR MAINTAINING SLEEP: ICD-10-CM

## 2022-01-06 RX ORDER — ZOLPIDEM TARTRATE 5 MG/1
TABLET ORAL
Qty: 30 TABLET | Refills: 5 | Status: SHIPPED | OUTPATIENT
Start: 2022-01-06 | End: 2022-06-15

## 2022-01-06 NOTE — TELEPHONE ENCOUNTER
Requested Prescriptions   Pending Prescriptions Disp Refills     zolpidem (AMBIEN) 5 MG tablet [Pharmacy Med Name: ZOLPIDEM TARTRATE 5MG TABS] 30 tablet 5     Sig: TAKE ONE TABLET BY MOUTH ONCE DAILY AS NEEDED FOR SLEEP AT BEDTIME       There is no refill protocol information for this order        Last Written Prescription Date:  6/23/21  Last Fill Quantity: 30,  # refills: 5   Last office visit: 6/23/2021 with prescribing provider:       Future Office Visit:

## 2022-01-13 ENCOUNTER — VIRTUAL VISIT (OUTPATIENT)
Dept: NEUROLOGY | Facility: CLINIC | Age: 52
End: 2022-01-13
Attending: OTOLARYNGOLOGY
Payer: COMMERCIAL

## 2022-01-13 DIAGNOSIS — G43.809 VESTIBULAR MIGRAINE: ICD-10-CM

## 2022-01-13 PROCEDURE — 99214 OFFICE O/P EST MOD 30 MIN: CPT | Mod: GT | Performed by: PSYCHIATRY & NEUROLOGY

## 2022-01-13 RX ORDER — TOPIRAMATE 25 MG/1
100 TABLET, FILM COATED ORAL EVERY EVENING
Qty: 120 TABLET | Refills: 3 | Status: SHIPPED | OUTPATIENT
Start: 2022-01-13 | End: 2022-04-21

## 2022-01-13 RX ORDER — PROCHLORPERAZINE MALEATE 10 MG
10 TABLET ORAL EVERY 6 HOURS PRN
Qty: 30 TABLET | Refills: 3 | Status: SHIPPED | OUTPATIENT
Start: 2022-01-13 | End: 2024-05-22

## 2022-01-13 ASSESSMENT — HEADACHE IMPACT TEST (HIT 6)
HOW OFTEN DID HEADACHS LIMIT CONCENTRATION ON WORK OR DAILY ACTIVITY: SOMETIMES
HOW OFTEN HAVE YOU FELT TOO TIRED TO WORK BECAUSE OF YOUR HEADACHES: RARELY
HOW OFTEN DO HEADACHES LIMIT YOUR DAILY ACTIVITIES: SOMETIMES
HOW OFTEN HAVE YOU FELT TOO TIRED TO WORK BECAUSE OF YOUR HEADACHES: RARELY
HOW OFTEN DO HEADACHES LIMIT YOUR DAILY ACTIVITIES: SOMETIMES
HIT6 TOTAL SCORE: 61
WHEN YOU HAVE HEADACHES HOW OFTEN IS THE PAIN SEVERE: SOMETIMES
HOW OFTEN HAVE YOU FELT FED UP OR IRRITATED BECAUSE OF YOUR HEADACHES: SOMETIMES
HIT6 TOTAL SCORE: 61
WHEN YOU HAVE A HEADACHE HOW OFTEN DO YOU WISH YOU COULD LIE DOWN: ALWAYS
WHEN YOU HAVE HEADACHES HOW OFTEN IS THE PAIN SEVERE: SOMETIMES
WHEN YOU HAVE A HEADACHE HOW OFTEN DO YOU WISH YOU COULD LIE DOWN: ALWAYS
HOW OFTEN HAVE YOU FELT FED UP OR IRRITATED BECAUSE OF YOUR HEADACHES: SOMETIMES
HOW OFTEN DID HEADACHS LIMIT CONCENTRATION ON WORK OR DAILY ACTIVITY: SOMETIMES

## 2022-01-13 NOTE — LETTER
1/13/2022       RE: Nilsa Vazquez  [address] [facility] [address]     Dear Colleague,    Thank you for referring your patient, Nilsa Vazquez, to the [facility] [place] [facility]. Please see a copy of my visit note below.    MIGRAINE DISABILITY ASSESSMENT (MIDAS)    On how many days in the last 3 months did you miss work or school because of your headaches?  3    How many days in the last 3 months was your productivity at work or school reduced by half or more because of your headaches? (Do not include days you counted in question 1 where you missed work or school.)  4    On how many days in the last 3 months did you not do household work (such as housework, home repairs and maintenance, shopping, caring for children and relatives) because of your headaches?  7    How many days in the last 3 months was your productivity in household work reduced by half or more because of your headaches? (Do not include days you counted in question 3 where you did not do household work).  3    On how many days in the last 3 months did you miss family, social, or lesiure activities because of your headaches?  3    MIDAS Total Score: 20    On how many days in the last 3 months did you have a headache? (If a headache lasted more than 1 day, count each day.)   12    On a scale of 0 - 10, on average how painful were these headaches (where 0 = no pain at all, and 10 = pain as bad as it can be.)  7    Last Patient-Answered HIT-6 Questionnaire  HIT-6 1/13/2022   When you have headaches, how often is the pain severe 10   How often do headaches limit your ability to do usual daily activities including household work, work, school, or social activities? 10   When you have a headache, how often do you wish you could lie down? 13   In the past 4 weeks, how often have you felt too tired to do work or daily activities because of your headaches 8   In  the past 4 weeks, how often have you felt fed up or irritated because of your headaches 10   In the past 4 weeks, how often did headaches limit your ability to concentrate on work or daily activities 10   HIT-6 Total Score 61     [facility]  Virtual Neurology Consult     Nilsa Vazquez MRN# [id number]   YOB: 1970 Age: 51 year old     Requesting physician: Dr. Cedeno         Assessment and Recommendations:     Nilsa Vazquez is a 51 year old female who presents for further evaluation of headaches and dizziness.    Her headache presentation shows a year of severe posterior headaches associated with photophobia, photophobia, in the setting of left glomus tumor status post resection years ago, with some residual tumor seen on recent imaging.  She also has 8 months of episodic vertigo, also associated with photophobia, photophobia, as well as nausea.    On exam, she has chronic neurologic deficits from this tumor/treatment, including tongue deviation, left facial palsy, decreased hearing on the left.    We discussed that while her headache and dizziness are likely secondary to her known tumor and required treatment, that the features are similar to chronic migraine, and possibly vestibular migraine, although she is clear that the dizziness is separate from headache and these are not associated.    Going forward, I think it would be reasonable to treat symptomatically for chronic migraine and monitor to see what effect it has on her headaches and dizziness.  -For acute treatment of mild headache, I recommend Excedrin as needed, not to exceed more than 9 days/month to avoid medication overuse.  - For acute treatment of headache not responsive to Excedrin, or for dizziness, I recommend prochlorperazine 10 mg taken with or without diphenhydramine 25 mg every 6 hours as needed, not to exceed more than 9 days/month to avoid medication side  effects.  -Triptan could be considered in the future, if her blood pressure were maintained below 160/100 consistently.    Her symptom frequency and severity warrant prevention.   -I recommend a trial of topiramate starting at 25 mg nightly, titrating up by 25 mg each week, as needed and tolerated, to a goal dose of 100 mg at night.  Potential side effects were discussed.  -Alternatives in the future could include antihypertensive such as beta-blockers or calcium channel blockers, if okay with her primary care physician in commendation with her other antihypertensives, amitriptyline, botulinum toxin injections, or a CGRP monoclonal antibody.    I instructed her to keep a headache diary.  I will plan to see her back in 3 months to monitor her progress.    Carol Ann Lai MD  Neurology            Chief Complaint:     Chief Complaint   Patient presents with     New Patient     Vestibular migraines, referral from Renetta Cedeno           History is obtained from the patient and medical record.  Patient was seen via a virtual visit in their home due to the Covid 19 global pandemic.      Nilsa Vazquez is a 51 year old female     She has lower neck/skull base headaches. She also has nausea and dizziness; she feels like the room is spinning.     About a year ago, she developed headaches; dizziness started about 8 months ago.     Headaches wrap around the back of her head. This is a stabbing pain. They can be severe and can wake her up from sleep. They are worst when she is laying down.     She has 20/30 headache days per month, with 2-3/30 severe headache days per month.    She has associated photophobia, phonophobia. No nausea or vomiting due to headaches.    She denies typical aura. It worsens when she lays on her back, better on her stomach. 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     Her dizziness is described as intermittent. When she looks down for a while, then looks up,  this triggers dizziness. She feels like the room is spinning and she is nauseated. It is usually triggered by head movement, possibly fluorescent lighting. It lasts 15 minutes to hours, until she goes to sleep. She describes difficulty focusing her eyes during this time. She has associated photophobia, phonophobia. This was occurring 3-4 times per month.    She had a tumor resected 15 years ago. She recalls an infection and second surgery. She stopped seeking care due to insurance difficulties. She reports that the tumor has returned, but she has been advised to wait and monitor.     For treatment, she takes Excedrin, which is somewhat helpful for headache, not dizziness. Otherwise, she lays down and covers her head.             Past Medical History:     Past Medical History:   Diagnosis Date     Abnormal Pap smear of cervix 2019    see problem list     Paraganglioma (H)               Past Surgical History:     Past Surgical History:   Procedure Laterality Date     craniotomy N/A      ORTHOPEDIC SURGERY       SURGICAL HISTORY OF -    approx    Bilateral feet repair, with pins in place     VASCULAR SURGERY               Social History:   She drinks 2 cups of coffee in the morning.   Social History     Socioeconomic History     Marital status:      Spouse name: Saurabh     Number of children: 2     Years of education: Not on file     Highest education level: Not on file   Occupational History     Occupation: accounts receivable   Tobacco Use     Smoking status: Former Smoker     Packs/day: 1.00     Years: 25.00     Pack years: 25.00     Types: Cigarettes     Quit date: 2005     Years since quittin.4     Smokeless tobacco: Never Used   Substance and Sexual Activity     Alcohol use: Yes     Comment: occasionally     Drug use: No     Sexual activity: Never   Other Topics Concern     Parent/sibling w/ CABG, MI or angioplasty before 65F 55M? Yes   Social History Narrative     Not on file     Social  Determinants of Health     Financial Resource Strain: Not on file   Food Insecurity: Not on file   Transportation Needs: Not on file   Physical Activity: Not on file   Stress: Not on file   Social Connections: Not on file   Intimate Partner Violence: Not on file   Housing Stability: Not on file          Family History:   There is no family history of headaches.   Family History   Problem Relation Age of Onset     Diabetes Mother         type 2     Hypertension Mother      Heart Failure Mother 75     Diabetes Father         type 2     Hypertension Father      Genitourinary Problems Father         kidney stones     Cancer Father 75        bladder cancer     Breast Cancer Maternal Grandmother      Diabetes Maternal Grandmother      Hypertension Maternal Grandmother      Cerebrovascular Disease Maternal Grandmother      Hypertension Brother      Blood Disease Brother         blood clot in leg          Allergies:    No Known Allergies       Medications:     Current Outpatient Medications:      diphenhydrAMINE (BENADRYL) 25 MG capsule, Take 25 mg by mouth every 6 hours as needed for itching or allergies, Disp: , Rfl:      IBUPROFEN 200 MG OR TABS, As needed, Disp: , Rfl:      losartan (COZAAR) 50 MG tablet, TAKE 2 TABLETS (100 MG) BY MOUTH DAILY, Disp: 180 tablet, Rfl: 0     multivitamin, therapeutic (THERA-VIT) TABS tablet, Take 1 tablet by mouth daily, Disp: , Rfl:      zolpidem (AMBIEN) 5 MG tablet, TAKE ONE TABLET BY MOUTH ONCE DAILY AS NEEDED FOR SLEEP AT BEDTIME, Disp: 30 tablet, Rfl: 5          Physical Exam:   There were no vitals taken for this visit.   Physical Exam:   General: NAD  Neurologic:   Mental Status Exam: Alert, awake and oriented to situation. No dysarthria. Speech of normal fluency.   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 No tongue  fasciculations.    Motor: No drift in upper extremities. Able to stand from a seated position without use of arms. No tremors or abnormal movements noted.   Coordination: With arms outstretched, able to touch nose using index finger accurately bilaterally.  Normal finger tapping bilaterally.     Gait: Normal stance and casual gait.    Neck: Normal range of motion with lateral head movements and neck flexion.  Eyes: No conjunctival injection, no scleral icterus.           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 It appears  that there is millimetric increase in size of the lesion since 2009.  Unchanged mild to moderate mass effect in the adjacent left sigmoid  sinus/proximal internal jugular vein.    MRA neck (10/13/2021):  1. Relatively hypoplastic left transverse sinus continuing with patent  left sigmoid sinus and left internal jugular vein. The junction of the  left sigmoid sinus and jugular vein is moderately narrowed, secondary  to mass effect from the known mass in the jugular foramen. This  appears relatively stable compared with prior MRI from 2/10/2009.  2. Normal neck MRA.      Carol Ann Lai MD

## 2022-01-13 NOTE — PROGRESS NOTES
Nayeli is a 51 year old who is being evaluated via a billable video visit.      How would you like to obtain your AVS? MyChart  If the video visit is dropped, the invitation should be resent by: Text to cell phone: [phone]  Will anyone else be joining your video visit? No      Video Start Time: 1:36 PM  Video-Visit Details    Type of service:  Video Visit    Video End Time:2:15 PM    Originating Location (pt. Location): Home    Distant Location (provider location):  [facility] NEUROLOGY [facility]     Platform used for Video Visit: [facility]     MIGRAINE DISABILITY ASSESSMENT (MIDAS)    On how many days in the last 3 months did you miss work or school because of your headaches?  3    How many days in the last 3 months was your productivity at work or school reduced by half or more because of your headaches? (Do not include days you counted in question 1 where you missed work or school.)  4    On how many days in the last 3 months did you not do household work (such as housework, home repairs and maintenance, shopping, caring for children and relatives) because of your headaches?  7    How many days in the last 3 months was your productivity in household work reduced by half or more because of your headaches? (Do not include days you counted in question 3 where you did not do household work).  3    On how many days in the last 3 months did you miss family, social, or lesiure activities because of your headaches?  3    MIDAS Total Score: 20    On how many days in the last 3 months did you have a headache? (If a headache lasted more than 1 day, count each day.)   12    On a scale of 0 - 10, on average how painful were these headaches (where 0 = no pain at all, and 10 = pain as bad as it can be.)  7    Last Patient-Answered HIT-6 Questionnaire  HIT-6 1/13/2022   When you have headaches, how often is the pain severe 10   How often do headaches limit your ability to do usual daily activities including  household work, work, school, or social activities? 10   When you have a headache, how often do you wish you could lie down? 13   In the past 4 weeks, how often have you felt too tired to do work or daily activities because of your headaches 8   In the past 4 weeks, how often have you felt fed up or irritated because of your headaches 10   In the past 4 weeks, how often did headaches limit your ability to concentrate on work or daily activities 10   HIT-6 Total Score 61     [facility]  Virtual Neurology Consult     Nilsa Vazquez MRN# [id number]   YOB: 1970 Age: 51 year old     Requesting physician: Dr. Cedeno         Assessment and Recommendations:     Nilsa Vazquez is a 51 year old female who presents for further evaluation of headaches and dizziness.    Her headache presentation shows a year of severe posterior headaches associated with photophobia, photophobia, in the setting of left glomus tumor status post resection years ago, with some residual tumor seen on recent imaging.  She also has 8 months of episodic vertigo, also associated with photophobia, photophobia, as well as nausea.    On exam, she has chronic neurologic deficits from this tumor/treatment, including tongue deviation, left facial palsy, decreased hearing on the left.    We discussed that while her headache and dizziness are likely secondary to her known tumor and required treatment, that the features are similar to chronic migraine, and possibly vestibular migraine, although she is clear that the dizziness is separate from headache and these are not associated.    Going forward, I think it would be reasonable to treat symptomatically for chronic migraine and monitor to see what effect it has on her headaches and dizziness.  -For acute treatment of mild headache, I recommend Excedrin as needed, not to exceed more than 9 days/month to avoid medication overuse.  - For  acute treatment of headache not responsive to Excedrin, or for dizziness, I recommend prochlorperazine 10 mg taken with or without diphenhydramine 25 mg every 6 hours as needed, not to exceed more than 9 days/month to avoid medication side effects.  -Triptan could be considered in the future, if her blood pressure were maintained below 160/100 consistently.    Her symptom frequency and severity warrant prevention.   -I recommend a trial of topiramate starting at 25 mg nightly, titrating up by 25 mg each week, as needed and tolerated, to a goal dose of 100 mg at night.  Potential side effects were discussed.  -Alternatives in the future could include antihypertensive such as beta-blockers or calcium channel blockers, if okay with her primary care physician in commendation with her other antihypertensives, amitriptyline, botulinum toxin injections, or a CGRP monoclonal antibody.    I instructed her to keep a headache diary.  I will plan to see her back in 3 months to monitor her progress.    Carol Ann Lai MD  Neurology            Chief Complaint:     Chief Complaint   Patient presents with     New Patient     Vestibular migraines, referral from Renetta Cedeno           History is obtained from the patient and medical record.  Patient was seen via a virtual visit in their home due to the Covid 19 global pandemic.      Nilsa Vazquez is a 51 year old female     She has lower neck/skull base headaches. She also has nausea and dizziness; she feels like the room is spinning.     About a year ago, she developed headaches; dizziness started about 8 months ago.     Headaches wrap around the back of her head. This is a stabbing pain. They can be severe and can wake her up from sleep. They are worst when she is laying down.     She has 20/30 headache days per month, with 2-3/30 severe headache days per month.    She has associated photophobia, phonophobia. No nausea or vomiting due to headaches.    She denies typical aura.  It worsens when she lays on her back, better on her stomach. 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     Her dizziness is described as intermittent. When she looks down for a while, then looks up, this triggers dizziness. She feels like the room is spinning and she is nauseated. It is usually triggered by head movement, possibly fluorescent lighting. It lasts 15 minutes to hours, until she goes to sleep. She describes difficulty focusing her eyes during this time. She has associated photophobia, phonophobia. This was occurring 3-4 times per month.    She had a tumor resected 15 years ago. She recalls an infection and second surgery. She stopped seeking care due to insurance difficulties. She reports that the tumor has returned, but she has been advised to wait and monitor.     For treatment, she takes Excedrin, which is somewhat helpful for headache, not dizziness. Otherwise, she lays down and covers her head.             Past Medical History:     Past Medical History:   Diagnosis Date     Abnormal Pap smear of cervix 2019    see problem list     Paraganglioma (H)               Past Surgical History:     Past Surgical History:   Procedure Laterality Date     craniotomy N/A      ORTHOPEDIC SURGERY       SURGICAL HISTORY OF -    approx    Bilateral feet repair, with pins in place     VASCULAR SURGERY               Social History:   She drinks 2 cups of coffee in the morning.   Social History     Socioeconomic History     Marital status:      Spouse name: Saurabh     Number of children: 2     Years of education: Not on file     Highest education level: Not on file   Occupational History     Occupation: accounts receivable   Tobacco Use     Smoking status: Former Smoker     Packs/day: 1.00     Years: 25.00     Pack years: 25.00     Types: Cigarettes     Quit date: 2005     Years since quittin.4     Smokeless tobacco: Never Used   Substance and Sexual  Activity     Alcohol use: Yes     Comment: occasionally     Drug use: No     Sexual activity: Never   Other Topics Concern     Parent/sibling w/ CABG, MI or angioplasty before 65F 55M? Yes   Social History Narrative     Not on file     Social Determinants of Health     Financial Resource Strain: Not on file   Food Insecurity: Not on file   Transportation Needs: Not on file   Physical Activity: Not on file   Stress: Not on file   Social Connections: Not on file   Intimate Partner Violence: Not on file   Housing Stability: Not on file             Family History:   There is no family history of headaches.   Family History   Problem Relation Age of Onset     Diabetes Mother         type 2     Hypertension Mother      Heart Failure Mother 75     Diabetes Father         type 2     Hypertension Father      Genitourinary Problems Father         kidney stones     Cancer Father 75        bladder cancer     Breast Cancer Maternal Grandmother      Diabetes Maternal Grandmother      Hypertension Maternal Grandmother      Cerebrovascular Disease Maternal Grandmother      Hypertension Brother      Blood Disease Brother         blood clot in leg             Allergies:    No Known Allergies          Medications:     Current Outpatient Medications:      diphenhydrAMINE (BENADRYL) 25 MG capsule, Take 25 mg by mouth every 6 hours as needed for itching or allergies, Disp: , Rfl:      IBUPROFEN 200 MG OR TABS, As needed, Disp: , Rfl:      losartan (COZAAR) 50 MG tablet, TAKE 2 TABLETS (100 MG) BY MOUTH DAILY, Disp: 180 tablet, Rfl: 0     multivitamin, therapeutic (THERA-VIT) TABS tablet, Take 1 tablet by mouth daily, Disp: , Rfl:      zolpidem (AMBIEN) 5 MG tablet, TAKE ONE TABLET BY MOUTH ONCE DAILY AS NEEDED FOR SLEEP AT BEDTIME, Disp: 30 tablet, Rfl: 5          Physical Exam:   There were no vitals taken for this visit.   Physical Exam:   General: NAD  Neurologic:   Mental Status Exam: Alert, awake and oriented to situation. No  dysarthria. Speech of normal fluency.   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 No tongue fasciculations.    Motor: No drift in upper extremities. Able to stand from a seated position without use of arms. No tremors or abnormal movements noted.   Coordination: With arms outstretched, able to touch nose using index finger accurately bilaterally.  Normal finger tapping bilaterally.     Gait: Normal stance and casual gait.    Neck: Normal range of motion with lateral head movements and neck flexion.  Eyes: No conjunctival injection, no scleral icterus.           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 It appears  that there is millimetric increase in size of the lesion since 2009.  Unchanged mild to moderate mass effect in the adjacent left sigmoid  sinus/proximal internal jugular vein.    MRA neck (10/13/2021):  1. Relatively hypoplastic left transverse sinus continuing with patent  left sigmoid sinus and left internal jugular vein. The junction of the  left sigmoid sinus and jugular vein is moderately narrowed, secondary  to mass effect from the known mass in the jugular foramen. This  appears relatively stable compared with prior MRI from 2/10/2009.  2. Normal neck MRA.

## 2022-03-23 DIAGNOSIS — I10 ESSENTIAL HYPERTENSION, BENIGN: ICD-10-CM

## 2022-03-24 RX ORDER — LOSARTAN POTASSIUM 50 MG/1
100 TABLET ORAL DAILY
Qty: 180 TABLET | Refills: 0 | Status: SHIPPED | OUTPATIENT
Start: 2022-03-24 | End: 2022-06-23

## 2022-03-24 NOTE — TELEPHONE ENCOUNTER
"Requested Prescriptions   Pending Prescriptions Disp Refills     losartan (COZAAR) 50 MG tablet [Pharmacy Med Name: LOSARTAN POTASSIUM 50MG TABS] 180 tablet 0     Sig: TAKE 2 TABLETS (100 MG) BY MOUTH DAILY       Angiotensin-II Receptors Failed - 3/23/2022 10:11 PM        Failed - Last blood pressure under 140/90 in past 12 months     BP Readings from Last 3 Encounters:   11/26/21 (!) 174/87   09/17/21 129/85   09/08/21 (!) 172/97                 Passed - Recent (12 mo) or future (30 days) visit within the authorizing provider's specialty     Patient has had an office visit with the authorizing provider or a provider within the authorizing providers department within the previous 12 mos or has a future within next 30 days. See \"Patient Info\" tab in inbasket, or \"Choose Columns\" in Meds & Orders section of the refill encounter.              Passed - Medication is active on med list        Passed - Patient is age 18 or older        Passed - No active pregnancy on record        Passed - Normal serum creatinine on file in past 12 months     Recent Labs   Lab Test 06/23/21  0759   CR 0.75       Ok to refill medication if creatinine is low          Passed - Normal serum potassium on file in past 12 months     Recent Labs   Lab Test 06/23/21  0759   POTASSIUM 4.1                    Passed - No positive pregnancy test in past 12 months             "

## 2022-04-21 ENCOUNTER — VIRTUAL VISIT (OUTPATIENT)
Dept: NEUROLOGY | Facility: CLINIC | Age: 52
End: 2022-04-21
Payer: COMMERCIAL

## 2022-04-21 DIAGNOSIS — G43.809 VESTIBULAR MIGRAINE: ICD-10-CM

## 2022-04-21 PROCEDURE — 99214 OFFICE O/P EST MOD 30 MIN: CPT | Mod: GT | Performed by: PSYCHIATRY & NEUROLOGY

## 2022-04-21 RX ORDER — TOPIRAMATE 25 MG/1
100 TABLET, FILM COATED ORAL EVERY EVENING
Qty: 120 TABLET | Refills: 3 | Status: SHIPPED | OUTPATIENT
Start: 2022-04-21 | End: 2022-06-03

## 2022-04-21 ASSESSMENT — HEADACHE IMPACT TEST (HIT 6)
HOW OFTEN HAVE YOU FELT TOO TIRED TO WORK BECAUSE OF YOUR HEADACHES: RARELY
WHEN YOU HAVE A HEADACHE HOW OFTEN DO YOU WISH YOU COULD LIE DOWN: SOMETIMES
HOW OFTEN DID HEADACHS LIMIT CONCENTRATION ON WORK OR DAILY ACTIVITY: RARELY
HOW OFTEN HAVE YOU FELT FED UP OR IRRITATED BECAUSE OF YOUR HEADACHES: RARELY
HIT6 TOTAL SCORE: 52
HOW OFTEN HAVE YOU FELT TOO TIRED TO WORK BECAUSE OF YOUR HEADACHES: RARELY
HIT6 TOTAL SCORE: 52
WHEN YOU HAVE HEADACHES HOW OFTEN IS THE PAIN SEVERE: SOMETIMES
WHEN YOU HAVE HEADACHES HOW OFTEN IS THE PAIN SEVERE: SOMETIMES
HOW OFTEN DO HEADACHES LIMIT YOUR DAILY ACTIVITIES: RARELY
HOW OFTEN DO HEADACHES LIMIT YOUR DAILY ACTIVITIES: RARELY
HOW OFTEN HAVE YOU FELT FED UP OR IRRITATED BECAUSE OF YOUR HEADACHES: RARELY
WHEN YOU HAVE A HEADACHE HOW OFTEN DO YOU WISH YOU COULD LIE DOWN: SOMETIMES
HOW OFTEN DID HEADACHS LIMIT CONCENTRATION ON WORK OR DAILY ACTIVITY: RARELY

## 2022-04-21 NOTE — PROGRESS NOTES
Nayeli is a 51 year old who is being evaluated via a billable video visit.      How would you like to obtain your AVS? MyChart  If the video visit is dropped, the invitation should be resent by: Text to cell phone: [phone]  Will anyone else be joining your video visit? No      Video Start Time: 3:11 PM  Video-Visit Details    Type of service:  Video Visit    Video End Time:3:20pm    Originating Location (pt. Location): Home    Distant Location (provider location):  [facility] NEUROLOGY CLINIC [place]     Platform used for Video Visit: [facility]        MIGRAINE DISABILITY ASSESSMENT (MIDAS)    On how many days in the last 3 months did you miss work or school because of your headaches?  1    How many days in the last 3 months was your productivity at work or school reduced by half or more because of your headaches? (Do not include days you counted in question 1 where you missed work or school.)  0    On how many days in the last 3 months did you not do household work (such as housework, home repairs and maintenance, shopping, caring for children and relatives) because of your headaches?  1    How many days in the last 3 months was your productivity in household work reduced by half or more because of your headaches? (Do not include days you counted in question 3 where you did not do household work).  0    On how many days in the last 3 months did you miss family, social, or lesiure activities because of your headaches?  2    MIDAS Total Score: 4    On how many days in the last 3 months did you have a headache? (If a headache lasted more than 1 day, count each day.)   2    On a scale of 0 - 10, on average how painful were these headaches (where 0 = no pain at all, and 10 = pain as bad as it can be.)  8    [facility]    Clinics and Surgery Center  Neurology Progress Note    Subjective:    Ms. Vazquez returns for follow-up of headache and dizziness.  At her last visit, I  recommended a trial of topiramate for treatment of possible chronic migraine and vestibular migraine.    Today, she reports that her dizziness has pretty much resolved.  She no longer has spells of dizziness, and has not taken prochlorperazine for nausea.    Her headaches are improved; only one major headache, lasting 1.5 days.     She is taking topiramate 100 mg nightly. She attributes this improvement to the topiramate.  She describes some side effects.  She has tingling in her face and other parts of her body; she also feels absent-minded or not present at work.     For acute treatment of headache, she takes Excedrin as needed. She also finds sleep helpful. She hasn't taken prochlorperazine.     She is no longer wearing a mask at work; this is helpful.  Previously, the ear loops could be irritating and possibly a headache trigger.    She takes losartan for her blood pressure.     Objective:    Vitals: There were no vitals taken for this visit.  General: Cooperative, NAD  Neurologic:  On exam, she has chronic neurologic deficits from tumor/treatment, including tongue deviation, left facial palsy, decreased hearing on the left.    Assessment/Plan:   Nilsa Vazquez is a 51-year-old woman who returns for follow-up of a year of severe posterior headaches associated with photophobia, photophobia, in the setting of left glomus tumor status post resection years ago, with some residual tumor seen on imaging.  She also had 8 months of episodic vertigo, also associated with photophobia, photophobia, as well as nausea.  Treatment for possible chronic migraine was initiated.     Topiramate has been very helpful at reducing her headache frequency and severity, and has nearly resolved her dizziness.    Going forward, she will continue symptomatic treatment for chronic migraine and possible vestibular migraine.  She will reduce the dose of topiramate to try to limit side effect burden.  -For acute treatment of mild headache,  I recommend Excedrin as needed, not to exceed more than 9 days/month to avoid medication overuse.  - For acute treatment of headache not responsive to Excedrin, or for dizziness, I recommend prochlorperazine 10 mg taken with or without diphenhydramine 25 mg every 6 hours as needed, not to exceed more than 9 days/month to avoid medication side effects.  -Triptan could be considered in the future, if her blood pressure were maintained below 160/100 consistently.     Her symptom frequency and severity warrant prevention.   -I recommend a trial of topiramate 75 mg nightly for 1 to 2 weeks, decreasing to 50 mg nightly, if side effects remain intolerable.  -Alternatives in the future could include antihypertensive such as beta-blockers or calcium channel blockers, if okay with her primary care physician in commendation with her other antihypertensives, amitriptyline, botulinum toxin injections, or a CGRP monoclonal antibody.    I will plan to see her back in 3 months to monitor her progress.    Carol Ann Lai MD  Neurology

## 2022-04-21 NOTE — LETTER
4/21/2022       RE: Nilsa Vazquez  [address] [id number]     Dear Colleague,    Thank you for referring your patient, Nilsa Vazquez, to the [facility] NEUROLOGY CLINIC [place] at [facility]. Please see a copy of my visit note below.      MIGRAINE DISABILITY ASSESSMENT (MIDAS)    On how many days in the last 3 months did you miss work or school because of your headaches?  1    How many days in the last 3 months was your productivity at work or school reduced by half or more because of your headaches? (Do not include days you counted in question 1 where you missed work or school.)  0    On how many days in the last 3 months did you not do household work (such as housework, home repairs and maintenance, shopping, caring for children and relatives) because of your headaches?  1    How many days in the last 3 months was your productivity in household work reduced by half or more because of your headaches? (Do not include days you counted in question 3 where you did not do household work).  0    On how many days in the last 3 months did you miss family, social, or lesiure activities because of your headaches?  2    MIDAS Total Score: 4    On how many days in the last 3 months did you have a headache? (If a headache lasted more than 1 day, count each day.)   2    On a scale of 0 - 10, on average how painful were these headaches (where 0 = no pain at all, and 10 = pain as bad as it can be.)  8    [facility]    Clinics and Surgery Center  Neurology Progress Note    Subjective:    Ms. Vazquez returns for follow-up of headache and dizziness.  At her last visit, I recommended a trial of topiramate for treatment of possible chronic migraine and vestibular migraine.    Today, she reports that her dizziness has pretty much resolved.  She no longer has spells of dizziness, and has not taken prochlorperazine for  nausea.    Her headaches are improved; only one major headache, lasting 1.5 days.     She is taking topiramate 100 mg nightly. She attributes this improvement to the topiramate.  She describes some side effects.  She has tingling in her face and other parts of her body; she also feels absent-minded or not present at work.     For acute treatment of headache, she takes Excedrin as needed. She also finds sleep helpful. She hasn't taken prochlorperazine.     She is no longer wearing a mask at work; this is helpful.  Previously, the ear loops could be irritating and possibly a headache trigger.    She takes losartan for her blood pressure.     Objective:    Vitals: There were no vitals taken for this visit.  General: Cooperative, NAD  Neurologic:  On exam, she has chronic neurologic deficits from tumor/treatment, including tongue deviation, left facial palsy, decreased hearing on the left.    Assessment/Plan:   Nilsa Vazquez is a 51-year-old woman who returns for follow-up of a year of severe posterior headaches associated with photophobia, photophobia, in the setting of left glomus tumor status post resection years ago, with some residual tumor seen on imaging.  She also had 8 months of episodic vertigo, also associated with photophobia, photophobia, as well as nausea.  Treatment for possible chronic migraine was initiated.     Topiramate has been very helpful at reducing her headache frequency and severity, and has nearly resolved her dizziness.    Going forward, she will continue symptomatic treatment for chronic migraine and possible vestibular migraine.  She will reduce the dose of topiramate to try to limit side effect burden.  -For acute treatment of mild headache, I recommend Excedrin as needed, not to exceed more than 9 days/month to avoid medication overuse.  - For acute treatment of headache not responsive to Excedrin, or for dizziness, I recommend prochlorperazine 10 mg taken with or without  diphenhydramine 25 mg every 6 hours as needed, not to exceed more than 9 days/month to avoid medication side effects.  -Triptan could be considered in the future, if her blood pressure were maintained below 160/100 consistently.     Her symptom frequency and severity warrant prevention.   -I recommend a trial of topiramate 75 mg nightly for 1 to 2 weeks, decreasing to 50 mg nightly, if side effects remain intolerable.  -Alternatives in the future could include antihypertensive such as beta-blockers or calcium channel blockers, if okay with her primary care physician in commendation with her other antihypertensives, amitriptyline, botulinum toxin injections, or a CGRP monoclonal antibody.    I will plan to see her back in 3 months to monitor her progress.      Carol Ann Lai MD  Neurology

## 2022-05-16 ENCOUNTER — TELEPHONE (OUTPATIENT)
Dept: NEUROLOGY | Facility: CLINIC | Age: 52
End: 2022-05-16
Payer: COMMERCIAL

## 2022-06-03 DIAGNOSIS — G43.809 VESTIBULAR MIGRAINE: ICD-10-CM

## 2022-06-03 RX ORDER — TOPIRAMATE 25 MG/1
75 TABLET, FILM COATED ORAL EVERY EVENING
Qty: 90 TABLET | Refills: 11 | Status: SHIPPED | OUTPATIENT
Start: 2022-06-03 | End: 2022-08-24

## 2022-06-14 ENCOUNTER — MYC MEDICAL ADVICE (OUTPATIENT)
Dept: FAMILY MEDICINE | Facility: CLINIC | Age: 52
End: 2022-06-14
Payer: COMMERCIAL

## 2022-06-14 DIAGNOSIS — G47.00 PERSISTENT DISORDER OF INITIATING OR MAINTAINING SLEEP: ICD-10-CM

## 2022-06-15 RX ORDER — ZOLPIDEM TARTRATE 5 MG/1
5 TABLET ORAL
Qty: 30 TABLET | Refills: 0 | Status: SHIPPED | OUTPATIENT
Start: 2022-06-15 | End: 2022-07-05

## 2022-07-05 ENCOUNTER — MYC REFILL (OUTPATIENT)
Dept: FAMILY MEDICINE | Facility: CLINIC | Age: 52
End: 2022-07-05

## 2022-07-05 DIAGNOSIS — G47.00 PERSISTENT DISORDER OF INITIATING OR MAINTAINING SLEEP: ICD-10-CM

## 2022-07-06 RX ORDER — ZOLPIDEM TARTRATE 5 MG/1
5 TABLET ORAL
Qty: 30 TABLET | Refills: 0 | Status: SHIPPED | OUTPATIENT
Start: 2022-07-06 | End: 2022-07-27

## 2022-07-06 NOTE — TELEPHONE ENCOUNTER
Routing to ordering provider for consideration, not on refill protocol. Appt 7.27           Vicky Rivera RN MSN

## 2022-07-24 ASSESSMENT — ENCOUNTER SYMPTOMS
DIZZINESS: 0
NERVOUS/ANXIOUS: 0
SHORTNESS OF BREATH: 0
ABDOMINAL PAIN: 0
CONSTIPATION: 0
NAUSEA: 0
MYALGIAS: 0
DYSURIA: 0
HEMATURIA: 0
ARTHRALGIAS: 0
COUGH: 0
PARESTHESIAS: 0
PALPITATIONS: 0
HEMATOCHEZIA: 0
FREQUENCY: 0
EYE PAIN: 0
HEARTBURN: 0
DIARRHEA: 0
HEADACHES: 1
FEVER: 0
SORE THROAT: 0
CHILLS: 0
JOINT SWELLING: 0
BREAST MASS: 0
WEAKNESS: 1

## 2022-07-27 ENCOUNTER — OFFICE VISIT (OUTPATIENT)
Dept: FAMILY MEDICINE | Facility: CLINIC | Age: 52
End: 2022-07-27
Payer: COMMERCIAL

## 2022-07-27 VITALS
WEIGHT: 240 LBS | DIASTOLIC BLOOD PRESSURE: 82 MMHG | RESPIRATION RATE: 12 BRPM | TEMPERATURE: 98.2 F | SYSTOLIC BLOOD PRESSURE: 138 MMHG | HEIGHT: 66 IN | HEART RATE: 80 BPM | BODY MASS INDEX: 38.57 KG/M2

## 2022-07-27 DIAGNOSIS — E66.01 MORBID OBESITY (H): ICD-10-CM

## 2022-07-27 DIAGNOSIS — I10 ESSENTIAL HYPERTENSION, BENIGN: ICD-10-CM

## 2022-07-27 DIAGNOSIS — N39.3 FEMALE STRESS INCONTINENCE: ICD-10-CM

## 2022-07-27 DIAGNOSIS — Z12.4 CERVICAL CANCER SCREENING: ICD-10-CM

## 2022-07-27 DIAGNOSIS — Z00.00 ROUTINE GENERAL MEDICAL EXAMINATION AT A HEALTH CARE FACILITY: Primary | ICD-10-CM

## 2022-07-27 DIAGNOSIS — D44.7 GLOMUS JUGULARE TUMOR (H): ICD-10-CM

## 2022-07-27 DIAGNOSIS — G47.00 PERSISTENT DISORDER OF INITIATING OR MAINTAINING SLEEP: ICD-10-CM

## 2022-07-27 LAB
ALBUMIN SERPL-MCNC: 3.6 G/DL (ref 3.4–5)
ALP SERPL-CCNC: 122 U/L (ref 40–150)
ALT SERPL W P-5'-P-CCNC: 33 U/L (ref 0–50)
ANION GAP SERPL CALCULATED.3IONS-SCNC: 4 MMOL/L (ref 3–14)
AST SERPL W P-5'-P-CCNC: 16 U/L (ref 0–45)
BILIRUB SERPL-MCNC: 0.6 MG/DL (ref 0.2–1.3)
BUN SERPL-MCNC: 13 MG/DL (ref 7–30)
CALCIUM SERPL-MCNC: 8.9 MG/DL (ref 8.5–10.1)
CHLORIDE BLD-SCNC: 108 MMOL/L (ref 94–109)
CHOLEST SERPL-MCNC: 157 MG/DL
CO2 SERPL-SCNC: 26 MMOL/L (ref 20–32)
CREAT SERPL-MCNC: 0.8 MG/DL (ref 0.52–1.04)
ERYTHROCYTE [DISTWIDTH] IN BLOOD BY AUTOMATED COUNT: 12.8 % (ref 10–15)
FASTING STATUS PATIENT QL REPORTED: ABNORMAL
GFR SERPL CREATININE-BSD FRML MDRD: 88 ML/MIN/1.73M2
GLUCOSE BLD-MCNC: 105 MG/DL (ref 70–99)
HCT VFR BLD AUTO: 44.4 % (ref 35–47)
HDLC SERPL-MCNC: 37 MG/DL
HGB BLD-MCNC: 15.2 G/DL (ref 11.7–15.7)
LDLC SERPL CALC-MCNC: 97 MG/DL
MCH RBC QN AUTO: 29.2 PG (ref 26.5–33)
MCHC RBC AUTO-ENTMCNC: 34.2 G/DL (ref 31.5–36.5)
MCV RBC AUTO: 85 FL (ref 78–100)
NONHDLC SERPL-MCNC: 120 MG/DL
PLATELET # BLD AUTO: 367 10E3/UL (ref 150–450)
POTASSIUM BLD-SCNC: 3.8 MMOL/L (ref 3.4–5.3)
PROT SERPL-MCNC: 7.6 G/DL (ref 6.8–8.8)
RBC # BLD AUTO: 5.2 10E6/UL (ref 3.8–5.2)
SODIUM SERPL-SCNC: 138 MMOL/L (ref 133–144)
TRIGL SERPL-MCNC: 116 MG/DL
TSH SERPL DL<=0.005 MIU/L-ACNC: 2.36 MU/L (ref 0.4–4)
WBC # BLD AUTO: 12.1 10E3/UL (ref 4–11)

## 2022-07-27 PROCEDURE — 90750 HZV VACC RECOMBINANT IM: CPT | Performed by: FAMILY MEDICINE

## 2022-07-27 PROCEDURE — 84443 ASSAY THYROID STIM HORMONE: CPT | Performed by: FAMILY MEDICINE

## 2022-07-27 PROCEDURE — 80053 COMPREHEN METABOLIC PANEL: CPT | Performed by: FAMILY MEDICINE

## 2022-07-27 PROCEDURE — 90471 IMMUNIZATION ADMIN: CPT | Performed by: FAMILY MEDICINE

## 2022-07-27 PROCEDURE — 36415 COLL VENOUS BLD VENIPUNCTURE: CPT | Performed by: FAMILY MEDICINE

## 2022-07-27 PROCEDURE — 80061 LIPID PANEL: CPT | Performed by: FAMILY MEDICINE

## 2022-07-27 PROCEDURE — 99214 OFFICE O/P EST MOD 30 MIN: CPT | Mod: 25 | Performed by: FAMILY MEDICINE

## 2022-07-27 PROCEDURE — G0124 SCREEN C/V THIN LAYER BY MD: HCPCS | Performed by: PATHOLOGY

## 2022-07-27 PROCEDURE — 87624 HPV HI-RISK TYP POOLED RSLT: CPT | Performed by: FAMILY MEDICINE

## 2022-07-27 PROCEDURE — 99396 PREV VISIT EST AGE 40-64: CPT | Mod: 25 | Performed by: FAMILY MEDICINE

## 2022-07-27 PROCEDURE — G0145 SCR C/V CYTO,THINLAYER,RESCR: HCPCS | Performed by: FAMILY MEDICINE

## 2022-07-27 PROCEDURE — 85027 COMPLETE CBC AUTOMATED: CPT | Performed by: FAMILY MEDICINE

## 2022-07-27 RX ORDER — ZOLPIDEM TARTRATE 5 MG/1
5 TABLET ORAL
Qty: 30 TABLET | Refills: 5 | Status: SHIPPED | OUTPATIENT
Start: 2022-07-27 | End: 2023-02-06

## 2022-07-27 RX ORDER — LOSARTAN POTASSIUM 50 MG/1
100 TABLET ORAL DAILY
Qty: 180 TABLET | Refills: 3 | Status: SHIPPED | OUTPATIENT
Start: 2022-07-27 | End: 2023-09-01

## 2022-07-27 ASSESSMENT — ENCOUNTER SYMPTOMS
BREAST MASS: 0
ARTHRALGIAS: 0
COUGH: 0
PARESTHESIAS: 0
SHORTNESS OF BREATH: 0
CHILLS: 0
DIZZINESS: 0
HEADACHES: 1
NERVOUS/ANXIOUS: 0
FEVER: 0
DIARRHEA: 0
PALPITATIONS: 0
EYE PAIN: 0
JOINT SWELLING: 0
WEAKNESS: 1
HEARTBURN: 0
NAUSEA: 0
HEMATOCHEZIA: 0
DYSURIA: 0
SORE THROAT: 0
HEMATURIA: 0
CONSTIPATION: 0
MYALGIAS: 0
FREQUENCY: 0
ABDOMINAL PAIN: 0

## 2022-07-27 ASSESSMENT — PAIN SCALES - GENERAL: PAINLEVEL: NO PAIN (0)

## 2022-07-27 NOTE — PROGRESS NOTES
SUBJECTIVE:   CC: Nilsa Vazquez is an 51 year old woman who presents for preventive health visit.       Patient has been advised of split billing requirements and indicates understanding: Yes  Healthy Habits:     Getting at least 3 servings of Calcium per day:  NO    Bi-annual eye exam:  NO    Dental care twice a year:  NO    Sleep apnea or symptoms of sleep apnea:  None    Diet:  Regular (no restrictions)    Frequency of exercise:  2-3 days/week    Duration of exercise:  15-30 minutes    Taking medications regularly:  Yes    Medication side effects:  Other    PHQ-2 Total Score: 1    Additional concerns today:  Yes      Hypertension Follow-up      Do you check your blood pressure regularly outside of the clinic? No     Are you following a low salt diet? No    Are your blood pressures ever more than 140 on the top number (systolic) OR more   than 90 on the bottom number (diastolic), for example 140/90?       URINARY TRACT SYMPTOMS      Duration: years and worsening     Description  Incontinence stress with cough laugh and sneeze     Intensity:  moderate    Accompanying signs and symptoms:  Fever/chills: no   Flank pain no   Nausea and vomiting: no   Vaginal symptoms: none  Abdominal/Pelvic Pain: no     History  History of frequent UTI's: no   History of kidney stones: no   Sexually Active: YES  Possibility of pregnancy: No    Precipitating or alleviating factors: None    Therapies tried and outcome: none       Today's PHQ-2 Score:   PHQ-2 ( 1999 Pfizer) 7/24/2022   Q1: Little interest or pleasure in doing things 1   Q2: Feeling down, depressed or hopeless 0   PHQ-2 Score 1   PHQ-2 Total Score (12-17 Years)- Positive if 3 or more points; Administer PHQ-A if positive -   Q1: Little interest or pleasure in doing things Several days   Q2: Feeling down, depressed or hopeless Not at all   PHQ-2 Score 1       Abuse: Current or Past (Physical, Sexual or Emotional) - No  Do you feel safe in your environment?  Yes        Social History     Tobacco Use     Smoking status: Former Smoker     Packs/day: 1.00     Years: 25.00     Pack years: 25.00     Types: Cigarettes     Quit date: 2005     Years since quittin.9     Smokeless tobacco: Never Used   Substance Use Topics     Alcohol use: Yes     Comment: occasionally     If you drink alcohol do you typically have >3 drinks per day or >7 drinks per week? No    Alcohol Use 2022   Prescreen: >3 drinks/day or >7 drinks/week? -   Prescreen: >3 drinks/day or >7 drinks/week? No       Reviewed orders with patient.  Reviewed health maintenance and updated orders accordingly - Yes      Breast Cancer Screening:    FHS-7:   Breast CA Risk Assessment (FHS-7) 2021   Did any of your first-degree relatives have breast or ovarian cancer? No No   Did any of your relatives have bilateral breast cancer? No No   Did any man in your family have breast cancer? No No   Did any woman in your family have breast and ovarian cancer? No No   Did any woman in your family have breast cancer before age 50 y? No No   Do you have 2 or more relatives with breast and/or ovarian cancer? No No   Do you have 2 or more relatives with breast and/or bowel cancer? No No       Mammogram Screening: Recommended annual mammography  Pertinent mammograms are reviewed under the imaging tab.    History of abnormal Pap smear: await results   PAP / HPV Latest Ref Rng & Units 2008   PAP (Historical) - ASC-US(A) NIL NIL   HPV16 NEG:Negative Negative - -   HPV18 NEG:Negative Negative - -   HRHPV NEG:Negative Negative - -     Reviewed and updated as needed this visit by clinical staff   Tobacco  Allergies  Meds  Problems  Med Hx  Surg Hx  Fam Hx            Reviewed and updated as needed this visit by Provider   Tobacco  Allergies  Meds  Problems  Med Hx  Surg Hx  Fam Hx               Review of Systems   Constitutional: Negative for chills and fever.   HENT: Negative  "for congestion, ear pain, hearing loss and sore throat.    Eyes: Negative for pain and visual disturbance.   Respiratory: Negative for cough and shortness of breath.    Cardiovascular: Negative for chest pain, palpitations and peripheral edema.   Gastrointestinal: Negative for abdominal pain, constipation, diarrhea, heartburn, hematochezia and nausea.   Breasts:  Negative for tenderness, breast mass and discharge.   Genitourinary: Negative for dysuria, frequency, genital sores, hematuria, pelvic pain, urgency, vaginal bleeding and vaginal discharge.   Musculoskeletal: Negative for arthralgias, joint swelling and myalgias.   Skin: Negative for rash.   Neurological: Positive for weakness and headaches. Negative for dizziness and paresthesias.   Psychiatric/Behavioral: Positive for mood changes. The patient is not nervous/anxious.           OBJECTIVE:   /82   Pulse 80   Temp 98.2  F (36.8  C) (Tympanic)   Resp 12   Ht 1.664 m (5' 5.5\")   Wt 108.9 kg (240 lb)   LMP 07/20/2022   BMI 39.33 kg/m    Physical Exam  GENERAL APPEARANCE: healthy, alert and no distress  EYES: Eyes grossly normal to inspection, PERRL and conjunctivae and sclerae normal  HENT: ear canals and TM's normal, nose and mouth without ulcers or lesions, oropharynx clear and oral mucous membranes moist  NECK: no adenopathy, no asymmetry, masses, or scars and thyroid normal to palpation  RESP: lungs clear to auscultation - no rales, rhonchi or wheezes  BREAST: normal without masses, tenderness or nipple discharge and no palpable axillary masses or adenopathy  CV: regular rate and rhythm, normal S1 S2, no S3 or S4, no murmur, click or rub, no peripheral edema and peripheral pulses strong  ABDOMEN: soft, nontender, no hepatosplenomegaly, no masses and bowel sounds normal   (female): normal female external genitalia, normal urethral meatus, vaginal mucosal atrophy noted, normal cervix, adnexae, and uterus without masses., cystocele  and " "rectocele   MS: no musculoskeletal defects are noted and gait is age appropriate without ataxia  SKIN: no suspicious lesions or rashes  NEURO: Normal strength and tone, sensory exam grossly normal, mentation intact and speech normal  PSYCH: mentation appears normal and affect normal/bright    Diagnostic Test Results:  Labs reviewed in Epic    ASSESSMENT/PLAN:   (Z00.00) Routine general medical examination at a health care facility  (primary encounter diagnosis)  Comment:   Plan:     (Z12.4) Cervical cancer screening  Comment:   Plan: Pap Screen with HPV - recommended age 30 - 65         years            (I10) Essential hypertension, benign  Comment: Stable no change in treatment plan.   Plan: losartan (COZAAR) 50 MG tablet, CBC with         platelets, Comprehensive metabolic panel, Lipid        panel reflex to direct LDL Fasting, TSH with         free T4 reflex            (G47.00) Persistent disorder of initiating or maintaining sleep  Comment: Stable no change in treatment plan.   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    Weight as of this encounter: 108.9 kg (240 lb).    Weight management plan: Discussed healthy diet and exercise guidelines    She reports that she quit smoking about 16 years ago. Her smoking use included cigarettes. She has a 25.00 pack-year smoking " history. She has never used smokeless tobacco.      Counseling Resources:  ATP IV Guidelines  Pooled Cohorts Equation Calculator  Breast Cancer Risk Calculator  BRCA-Related Cancer Risk Assessment: FHS-7 Tool  FRAX Risk Assessment  ICSI Preventive Guidelines  Dietary Guidelines for Americans, 2010  USDA's MyPlate  ASA Prophylaxis  Lung CA Screening    Claudia Mitchell MD  [facility]

## 2022-07-27 NOTE — NURSING NOTE
"Chief Complaint   Patient presents with     Physical     /82   Pulse 80   Temp 98.2  F (36.8  C) (Tympanic)   Resp 12   Ht 1.664 m (5' 5.5\")   Wt 108.9 kg (240 lb)   LMP 07/20/2022   BMI 39.33 kg/m   Estimated body mass index is 39.33 kg/m  as calculated from the following:    Height as of this encounter: 1.664 m (5' 5.5\").    Weight as of this encounter: 108.9 kg (240 lb).  Patient presents to the clinic using No DME      Health Maintenance that is potentially due pending provider review:    Health Maintenance Due   Topic Date Due     ANNUAL REVIEW OF HM ORDERS  Never done     HIV SCREENING  Never done     HEPATITIS C SCREENING  Never done     ZOSTER IMMUNIZATION (1 of 2) Never done     COVID-19 Vaccine (3 - Booster for Pfizer series) 04/17/2022     PREVENTIVE CARE VISIT  06/23/2022     HPV TEST  06/28/2022     PAP  06/28/2022     MAMMO SCREENING  07/10/2022        Pended.        "

## 2022-08-02 LAB
BKR LAB AP GYN ADEQUACY: ABNORMAL
BKR LAB AP GYN INTERPRETATION: ABNORMAL
BKR LAB AP HPV REFLEX: ABNORMAL
BKR LAB AP LMP: ABNORMAL
BKR LAB AP PREVIOUS ABNL DX: ABNORMAL
BKR LAB AP PREVIOUS ABNORMAL: ABNORMAL
PATH REPORT.COMMENTS IMP SPEC: ABNORMAL
PATH REPORT.COMMENTS IMP SPEC: ABNORMAL
PATH REPORT.RELEVANT HX SPEC: ABNORMAL

## 2022-08-04 ENCOUNTER — PATIENT OUTREACH (OUTPATIENT)
Dept: FAMILY MEDICINE | Facility: CLINIC | Age: 52
End: 2022-08-04

## 2022-08-04 LAB
HUMAN PAPILLOMA VIRUS 16 DNA: NEGATIVE
HUMAN PAPILLOMA VIRUS 18 DNA: NEGATIVE
HUMAN PAPILLOMA VIRUS FINAL DIAGNOSIS: NORMAL
HUMAN PAPILLOMA VIRUS OTHER HR: NEGATIVE

## 2022-08-04 NOTE — TELEPHONE ENCOUNTER
7/27/22 ASCUS, neg HPV. Plan: cotest in 1 year   Last visit 11/27/17  Next visit None pending. 6 months.     Medication last prescribed 5/3/18 # 90 with 3 refills Bupropion Wellbutrin  mg tablet taking 1 daily. 5/3/18 # 90 with 3 refills for Duloxetine Cymbalta 60 mg capsule taking 1 daily.Verified prescription in Providers note.    Refills denied. Refills on file.

## 2022-08-21 ASSESSMENT — HEADACHE IMPACT TEST (HIT 6)
HIT6 TOTAL SCORE: 52
HOW OFTEN HAVE YOU FELT TOO TIRED TO WORK BECAUSE OF YOUR HEADACHES: RARELY
HOW OFTEN HAVE YOU FELT FED UP OR IRRITATED BECAUSE OF YOUR HEADACHES: RARELY
WHEN YOU HAVE HEADACHES HOW OFTEN IS THE PAIN SEVERE: SOMETIMES
HOW OFTEN DID HEADACHS LIMIT CONCENTRATION ON WORK OR DAILY ACTIVITY: RARELY
WHEN YOU HAVE A HEADACHE HOW OFTEN DO YOU WISH YOU COULD LIE DOWN: SOMETIMES
HOW OFTEN DO HEADACHES LIMIT YOUR DAILY ACTIVITIES: RARELY

## 2022-08-24 ENCOUNTER — VIRTUAL VISIT (OUTPATIENT)
Dept: NEUROLOGY | Facility: CLINIC | Age: 52
End: 2022-08-24
Payer: COMMERCIAL

## 2022-08-24 ENCOUNTER — TELEPHONE (OUTPATIENT)
Dept: OTOLARYNGOLOGY | Facility: CLINIC | Age: 52
End: 2022-08-24

## 2022-08-24 DIAGNOSIS — G43.809 VESTIBULAR MIGRAINE: Primary | ICD-10-CM

## 2022-08-24 DIAGNOSIS — G43.709 CHRONIC MIGRAINE WITHOUT AURA WITHOUT STATUS MIGRAINOSUS, NOT INTRACTABLE: ICD-10-CM

## 2022-08-24 PROCEDURE — 99214 OFFICE O/P EST MOD 30 MIN: CPT | Mod: GT | Performed by: PSYCHIATRY & NEUROLOGY

## 2022-08-24 RX ORDER — RIZATRIPTAN BENZOATE 5 MG/1
5 TABLET ORAL
Qty: 18 TABLET | Refills: 11 | Status: SHIPPED | OUTPATIENT
Start: 2022-08-24 | End: 2023-05-24

## 2022-08-24 RX ORDER — TOPIRAMATE 25 MG/1
75 TABLET, FILM COATED ORAL EVERY EVENING
Qty: 90 TABLET | Refills: 11 | Status: SHIPPED | OUTPATIENT
Start: 2022-08-24 | End: 2023-05-24

## 2022-08-24 ASSESSMENT — MIGRAINE DISABILITY ASSESSMENT (MIDAS)
HOW MANY DAYS WAS YOUR PRODUCTIVITY CUT IN HALF BECAUSE OF HEADACHES: 0
HOW MANY DAYS DID YOU MISS WORK OR SCHOOL BECAUSE OF HEADACHES: 2
HOW MANY DAYS WAS HOUSEWORK PRODUCTIVITY CUT IN HALF DUE TO HEADACHES: 0
HOW MANY DAYS DID YOU NOT DO HOUSEWORK BECAUSE OF HEADACHES: 2
ON A SCALE FROM 0-10 ON AVERAGE HOW PAINFUL WERE HEADACHES: 6
HOW OFTEN WERE SOCIAL ACTIVITIES MISSED DUE TO HEADACHES: 8
TOTAL SCORE: 12

## 2022-08-24 NOTE — LETTER
"8/24/2022       RE: Nilsa Vazquez  [address] [place] [id number]     Dear Colleague,    Thank you for referring your patient, Nilsa Vazquez, to the [facility] [place] at [facility]. Please see a copy of my visit note below.    [facility]    Headache Neurology Progress Note  August 24, 2022    Subjective:    Nilsa Vazquez returns for follow up of chronic headaches with a chronic migraine phenotype and possible vestibular migraine.  This is in the setting of a glomus tumor post resection.    She reports headaches have been doing \"really well\". She had one bad episode since I last saw her.     She continues to have milder headaches twice per week.     Excedrin is helpful as needed for milder headache.  This is not effective for the more severe headaches that occur rarely.    Topiramate 75 mg nightly is helpful.  She reduce dose from 100 mg nightly, due to intolerable side effects.  The tingling and cognitive side effects improved at 75 mg and she is continuing to have efficacy at this lower dose.    She continues to have some episodes of high blood pressure.  She reports that her blood pressure is running below 160/100, although she does not check this frequently at home during headache.  She has a blood pressure cuff available to her.    Objective:    Vitals: [facility] 07/20/2022   General: Cooperative, NAD  Neurologic:  Mental Status: Fully alert, attentive and oriented. Speech clear and fluent.   Cranial Nerves: Facial movements reduced on the left side of the face  Motor: No abnormal movements.      Assessment/Plan:   Nilsa Vazquez is a 51 year old woman who returns for follow-up of severe posterior headaches associated with photophobia, photophobia, in the setting of left glomus tumor status post resection years ago, with some residual tumor seen on imaging.  She also had 8 months of " episodic vertigo, also associated with photophobia, photophobia, as well as nausea.  Symptoms are well managed with topiramate 75 mg nightly.      Going forward, she will continue symptomatic treatment for chronic migraine and possible vestibular migraine.   -For acute treatment of mild headache, I recommend Excedrin as needed, not to exceed more than 9 days/month to avoid medication overuse.  - For acute treatment of headache not responsive to Excedrin, I recommend a trial of rizatriptan 5 mg taken at the onset of headache, with a repeat dose in 2 hours if needed.  This should not exceed more than 9 days/month to avoid medication overuse.  I recommend she check her blood pressure at home before taking this medication, and only proceed if her blood pressure is below 160/100.  Potential side effects were discussed.     Her symptom frequency and severity warrant prevention.   -I recommend she continue topiramate 75 mg nightly.  -Alternatives in the future could include antihypertensive such as beta-blockers or calcium channel blockers if okay with her primary care physician in combination with her other antihypertensives, amitriptyline, botulinum toxin injections, or a CGRP monoclonal antibody.    I will plan to see her back in 6 to 12 months to monitor her progress.  I have asked her to contact me if she has side effects from rizatriptan.    Carol Ann Lai MD  Neurology

## 2022-08-24 NOTE — NURSING NOTE
Chief Complaint   Patient presents with     Video Visit     3 month follow up- Medication check / Headaches

## 2022-08-24 NOTE — PROGRESS NOTES
"Nayeli is a 51 year old who is being evaluated via a billable video visit.      How would you like to obtain your AVS? MyChart  If the video visit is dropped, the invitation should be resent by: Text to cell phone: [phone]  Will anyone else be joining your video visit? No        Video-Visit Details    Video Start Time: 3:33 PM    Type of service:  Video Visit    Video End Time:3:42 PM    Originating Location (pt. Location): Home    Distant Location (provider location):  [facility] [place]     Platform used for Video Visit: [employer]     [facility]    Headache Neurology Progress Note  August 24, 2022    Subjective:    Nilsa Vazquez returns for follow up of chronic headaches with a chronic migraine phenotype and possible vestibular migraine.  This is in the setting of a glomus tumor post resection.    She reports headaches have been doing \"really well\". She had one bad episode since I last saw her.     She continues to have milder headaches twice per week.     Excedrin is helpful as needed for milder headache.  This is not effective for the more severe headaches that occur rarely.    Topiramate 75 mg nightly is helpful.  She reduce dose from 100 mg nightly, due to intolerable side effects.  The tingling and cognitive side effects improved at 75 mg and she is continuing to have efficacy at this lower dose.    She continues to have some episodes of high blood pressure.  She reports that her blood pressure is running below 160/100, although she does not check this frequently at home during headache.  She has a blood pressure cuff available to her.    Objective:    Vitals: [facility] 07/20/2022   General: Cooperative, NAD  Neurologic:  Mental Status: Fully alert, attentive and oriented. Speech clear and fluent.   Cranial Nerves: Facial movements reduced on the left side of the face  Motor: No abnormal movements.      Assessment/Plan:   Nilsa Vazquez is a 51 year old " woman who returns for follow-up of severe posterior headaches associated with photophobia, photophobia, in the setting of left glomus tumor status post resection years ago, with some residual tumor seen on imaging.  She also had 8 months of episodic vertigo, also associated with photophobia, photophobia, as well as nausea.  Symptoms are well managed with topiramate 75 mg nightly.      Going forward, she will continue symptomatic treatment for chronic migraine and possible vestibular migraine.   -For acute treatment of mild headache, I recommend Excedrin as needed, not to exceed more than 9 days/month to avoid medication overuse.  - For acute treatment of headache not responsive to Excedrin, I recommend a trial of rizatriptan 5 mg taken at the onset of headache, with a repeat dose in 2 hours if needed.  This should not exceed more than 9 days/month to avoid medication overuse.  I recommend she check her blood pressure at home before taking this medication, and only proceed if her blood pressure is below 160/100.  Potential side effects were discussed.     Her symptom frequency and severity warrant prevention.   -I recommend she continue topiramate 75 mg nightly.  -Alternatives in the future could include antihypertensive such as beta-blockers or calcium channel blockers if okay with her primary care physician in combination with her other antihypertensives, amitriptyline, botulinum toxin injections, or a CGRP monoclonal antibody.    I will plan to see her back in 6 to 12 months to monitor her progress.  I have asked her to contact me if she has side effects from rizatriptan.    Carol Ann Lai MD  Neurology

## 2022-09-13 ENCOUNTER — OFFICE VISIT (OUTPATIENT)
Dept: OBGYN | Facility: CLINIC | Age: 52
End: 2022-09-13
Payer: COMMERCIAL

## 2022-09-13 VITALS
WEIGHT: 241 LBS | SYSTOLIC BLOOD PRESSURE: 148 MMHG | BODY MASS INDEX: 38.73 KG/M2 | RESPIRATION RATE: 16 BRPM | TEMPERATURE: 98.6 F | HEIGHT: 66 IN | DIASTOLIC BLOOD PRESSURE: 77 MMHG | HEART RATE: 82 BPM

## 2022-09-13 DIAGNOSIS — N39.3 SUI (STRESS URINARY INCONTINENCE, FEMALE): Primary | ICD-10-CM

## 2022-09-13 LAB
ALBUMIN UR-MCNC: NEGATIVE MG/DL
APPEARANCE UR: CLEAR
BACTERIA #/AREA URNS HPF: ABNORMAL /HPF
BILIRUB UR QL STRIP: NEGATIVE
COLOR UR AUTO: YELLOW
GLUCOSE UR STRIP-MCNC: NEGATIVE MG/DL
HGB UR QL STRIP: ABNORMAL
KETONES UR STRIP-MCNC: NEGATIVE MG/DL
LEUKOCYTE ESTERASE UR QL STRIP: NEGATIVE
NITRATE UR QL: NEGATIVE
PH UR STRIP: 5.5 [PH] (ref 5–7)
RBC #/AREA URNS AUTO: ABNORMAL /HPF
SP GR UR STRIP: >=1.03 (ref 1–1.03)
SQUAMOUS #/AREA URNS AUTO: ABNORMAL /LPF
UROBILINOGEN UR STRIP-ACNC: 0.2 E.U./DL
WBC #/AREA URNS AUTO: ABNORMAL /HPF

## 2022-09-13 PROCEDURE — 87086 URINE CULTURE/COLONY COUNT: CPT | Performed by: OBSTETRICS & GYNECOLOGY

## 2022-09-13 PROCEDURE — 99203 OFFICE O/P NEW LOW 30 MIN: CPT | Mod: 25 | Performed by: OBSTETRICS & GYNECOLOGY

## 2022-09-13 PROCEDURE — 51798 US URINE CAPACITY MEASURE: CPT | Performed by: OBSTETRICS & GYNECOLOGY

## 2022-09-13 PROCEDURE — 81001 URINALYSIS AUTO W/SCOPE: CPT | Performed by: OBSTETRICS & GYNECOLOGY

## 2022-09-13 NOTE — PROGRESS NOTES
[facility]    Gynecology Consult Note    CC:     Chief Complaint   Patient presents with     Consult     Bladder concerns       HPI: Nilsa Vazquez is a 51 year old  being seen for GYN consultation for urinary incontinence by the request of her provider, Dr. Claudia Mitchell. Today she reports long history of urinary incontinence. Has been worsening over the years. Leakage of urine with coughing, sneezing, laughing. Is much worse when she is sick. More recently she started to have occasional leakage with walking when she has a full bladder in the morning. No urge to void at this time, just small leaks with her footsteps. This has become very bothersome to her. Feels that she always smells like urine.     GYN Hx:     Patient is menopausal: no  Reports increasingly irregular menses, every 2-8 weeks.     Patient's last menstrual period was 2022.    Last Pap Smear:   Lab Results   Component Value Date    PAP ASC-US 2019   HPV negative    ROS: A 10 pt ROS was completed and found to be otherwise negative unless mentioned in the HPI.     PMH:   Past Medical History:   Diagnosis Date     Abnormal Pap smear of cervix     see problem list     Paraganglioma (H)        PSHx:   Past Surgical History:   Procedure Laterality Date     craniotomy N/A      ORTHOPEDIC SURGERY       SURGICAL HISTORY OF -    approx    Bilateral feet repair, with pins in place     VASCULAR SURGERY         OBHx:   OB History    Para Term  AB Living   2 0 0 0 0 2   SAB IAB Ectopic Multiple Live Births   0 0 0 0 0      # Outcome Date GA Lbr Raj/2nd Weight Sex Delivery Anes PTL Lv   2             1                 Medications:   diphenhydrAMINE (BENADRYL) 25 MG capsule, Take 25 mg by mouth every 6 hours as needed for itching or allergies  IBUPROFEN 200 MG OR TABS, As needed  losartan (COZAAR) 50 MG tablet, Take 2 tablets (100 mg) by mouth daily  multivitamin, therapeutic  (THERA-VIT) TABS tablet, Take 1 tablet by mouth daily  prochlorperazine (COMPAZINE) 10 MG tablet, Take 1 tablet (10 mg) by mouth every 6 hours as needed for nausea or vomiting (migraine)  rizatriptan (MAXALT) 5 MG tablet, Take 1 tablet (5 mg) by mouth at onset of headache for migraine (repeat in 2 hours if needed) Do not take if bp is >160/100.  topiramate (TOPAMAX) 25 MG tablet, Take 3 tablets (75 mg) by mouth every evening  zolpidem (AMBIEN) 5 MG tablet, Take 1 tablet (5 mg) by mouth nightly as needed for sleep    No current facility-administered medications on file prior to visit.      Allergies:   No Known Allergies    Social History:   Social History     Socioeconomic History     Marital status:      Spouse name: Saurabh     Number of children: 2     Years of education: Not on file     Highest education level: Not on file   Occupational History     Occupation: accounts receivable   Tobacco Use     Smoking status: Former Smoker     Packs/day: 1.00     Years: 25.00     Pack years: 25.00     Types: Cigarettes     Quit date: 2005     Years since quittin.1     Smokeless tobacco: Never Used   Vaping Use     Vaping Use: Never used   Substance and Sexual Activity     Alcohol use: Yes     Comment: occasionally     Drug use: No     Sexual activity: Never   Other Topics Concern     Parent/sibling w/ CABG, MI or angioplasty before 65F 55M? Yes   Social History Narrative     Not on file     Social Determinants of Health     Financial Resource Strain: Not on file   Food Insecurity: Not on file   Transportation Needs: Not on file   Physical Activity: Not on file   Stress: Not on file   Social Connections: Not on file   Intimate Partner Violence: Not on file   Housing Stability: Not on file         Family History:   Family History   Problem Relation Age of Onset     Diabetes Mother         type 2     Hypertension Mother      Heart Failure Mother 75     Diabetes Father         type 2     Hypertension Father       "Genitourinary Problems Father         kidney stones     Cancer Father 75        bladder cancer     Breast Cancer Maternal Grandmother      Diabetes Maternal Grandmother      Hypertension Maternal Grandmother      Cerebrovascular Disease Maternal Grandmother      Hypertension Brother      Blood Disease Brother         blood clot in leg       Physical Exam:   Vitals:    09/13/22 1328   BP: (!) 148/77   BP Location: Right arm   Patient Position: Chair   Cuff Size: Adult Large   Pulse: 82   Resp: 16   Temp: 98.6  F (37  C)   TempSrc: Tympanic   Weight: 109.3 kg (241 lb)   Height: 1.664 m (5' 5.5\")      Estimated body mass index is 39.49 kg/m  as calculated from the following:    Height as of this encounter: 1.664 m (5' 5.5\").    Weight as of this encounter: 109.3 kg (241 lb).    General appearance: well-hydrated, A&O x 3, no apparent distress  Lungs: Equal expansion bilaterally, no accessory muscle use  Heart: No heaves or thrills.   Constitutional: See vitals  Abdomen: Soft, non-tender, non-distended. No rebound, rigidity, or guarding.  Extremities: no edema  Neuro: CN II-XII grossly intact  Genitourinary:  External genitalia: no erythema, no lesions.   Urethra: +hypermobile, No masses, tenderness, or scarring  Bladder no fullness, masses, or tenderness. No cystocele.  Anus and Perineum: Unremarkable, no visible lesions  Vagina: Normal, healthy pink mucosa without any lesions. Physiologic vaginal discharge.   Cervix: normal appearance, no cervical motion tenderness. Grade 1 uterine prolapse.  Uterus: normal size, shape and consistency.       Post void residual: 51mL    Assessment and Plan:     Encounter Diagnosis   Name Primary?     WILMAN (stress urinary incontinence, female) Yes     Discussed etiology of urinary incontinence and different types. Patient's history consistent with stress incontinence. UA/UCx sent for completeness to rule out infection. No signs of retention today, PVR low and no prolapse. Discussed " possible treatment options including expectant management, pelvic floor physical therapy, or mid urethral sling. Patient is interested in sling. Discussed risks and benefits of procedure, discussed use of mesh. All questions answered. Will plan to arrange for surgery.    Return to clinic as needed.    Heena Block,

## 2022-09-13 NOTE — NURSING NOTE
"Initial BP (!) 148/77 (BP Location: Right arm, Patient Position: Chair, Cuff Size: Adult Large)   Pulse 82   Temp 98.6  F (37  C) (Tympanic)   Resp 16   Ht 1.664 m (5' 5.5\")   Wt 109.3 kg (241 lb)   LMP 07/20/2022   BMI 39.49 kg/m   Estimated body mass index is 39.49 kg/m  as calculated from the following:    Height as of this encounter: 1.664 m (5' 5.5\").    Weight as of this encounter: 109.3 kg (241 lb). .      "

## 2022-09-15 LAB — BACTERIA UR CULT: NO GROWTH

## 2022-09-21 ENCOUNTER — TELEPHONE (OUTPATIENT)
Dept: OBGYN | Facility: CLINIC | Age: 52
End: 2022-09-21

## 2022-09-21 ENCOUNTER — PREP FOR PROCEDURE (OUTPATIENT)
Dept: OBGYN | Facility: CLINIC | Age: 52
End: 2022-09-21

## 2022-09-21 DIAGNOSIS — N39.3 SUI (STRESS URINARY INCONTINENCE, FEMALE): Primary | ICD-10-CM

## 2022-09-21 RX ORDER — ACETAMINOPHEN 325 MG/1
975 TABLET ORAL ONCE
Status: CANCELLED | OUTPATIENT
Start: 2022-09-21 | End: 2022-09-21

## 2022-09-21 RX ORDER — PHENAZOPYRIDINE HYDROCHLORIDE 100 MG/1
200 TABLET, FILM COATED ORAL ONCE
Status: CANCELLED | OUTPATIENT
Start: 2022-09-21 | End: 2022-09-21

## 2022-09-21 NOTE — TELEPHONE ENCOUNTER
"[id number]  Nilsa Vazquez    You are now scheduled for surgery at [facility].  Below are the details for your surgery.  Please read the \"Preparing for Your Surgery\" instructions and let us know if you have any questions.    Type of surgery: TRANSVAGINAL SLING, WITH CYSTOSCOPY    Surgeon:  Homar Clark MD  Location of surgery: [facility] OR    Date of surgery: 10-17-22    Time: 11:00am   Arrival Time: 10:00am    Time can change, to be confirmed a couple of days prior by pre-op surgery nurse.    Pre-Op Appt Date: Patient to schedule with a PCP or Family Practice Provider within 30 days to the surgery.  Post-Op Appt Date: To be determined by provider     *For overnight Surgery - PCR Covid-19 test from a lab required 2-4 days prior.  See \"testing for Covid-19 handout\"    *For Same Day Surgery Covid-19 test required 1-2 days prior.  See \"testing for Covid-19 handout\"    Packet sent out: Yes  Pre-cert/Authorization completed:  TBD by [employer].   MA Sterilization/Hysterectomy Acknowledgment Consent signed: Not Applicable    [facility]  [phone]    Fax: [phone]  Same Day Surgery [phone]  Fax: [phone]  [facility] [phone]    "

## 2022-10-04 ENCOUNTER — OFFICE VISIT (OUTPATIENT)
Dept: FAMILY MEDICINE | Facility: CLINIC | Age: 52
End: 2022-10-04
Payer: COMMERCIAL

## 2022-10-04 VITALS
TEMPERATURE: 98.2 F | RESPIRATION RATE: 18 BRPM | WEIGHT: 242 LBS | DIASTOLIC BLOOD PRESSURE: 82 MMHG | HEART RATE: 81 BPM | SYSTOLIC BLOOD PRESSURE: 150 MMHG | BODY MASS INDEX: 38.89 KG/M2 | HEIGHT: 66 IN | OXYGEN SATURATION: 99 %

## 2022-10-04 DIAGNOSIS — D44.7 GLOMUS JUGULARE TUMOR (H): ICD-10-CM

## 2022-10-04 DIAGNOSIS — N39.3 FEMALE STRESS INCONTINENCE: ICD-10-CM

## 2022-10-04 DIAGNOSIS — E66.01 MORBID OBESITY (H): ICD-10-CM

## 2022-10-04 DIAGNOSIS — Z01.818 PREOP GENERAL PHYSICAL EXAM: Primary | ICD-10-CM

## 2022-10-04 DIAGNOSIS — I10 ESSENTIAL HYPERTENSION, BENIGN: ICD-10-CM

## 2022-10-04 PROCEDURE — 99214 OFFICE O/P EST MOD 30 MIN: CPT | Performed by: FAMILY MEDICINE

## 2022-10-04 ASSESSMENT — PAIN SCALES - GENERAL: PAINLEVEL: NO PAIN (0)

## 2022-10-04 NOTE — NURSING NOTE
"Chief Complaint   Patient presents with     Pre-Op Exam     BP (!) 150/82 (Cuff Size: Adult Large)   Pulse 81   Temp 98.2  F (36.8  C) (Tympanic)   Resp 18   Ht 1.664 m (5' 5.5\")   Wt 109.8 kg (242 lb)   LMP 07/21/2022   SpO2 99%   Breastfeeding No   BMI 39.66 kg/m   Estimated body mass index is 39.66 kg/m  as calculated from the following:    Height as of this encounter: 1.664 m (5' 5.5\").    Weight as of this encounter: 109.8 kg (242 lb).  Patient presents to the clinic using No DME      Health Maintenance that is potentially due pending provider review:    Health Maintenance Due   Topic Date Due     HIV SCREENING  Never done     HEPATITIS C SCREENING  Never done     COVID-19 Vaccine (3 - Booster for Pfizer series) 01/12/2022     MAMMO SCREENING  07/10/2022     INFLUENZA VACCINE (1) 09/01/2022     ZOSTER IMMUNIZATION (2 of 2) 09/21/2022                "

## 2022-10-04 NOTE — PROGRESS NOTES
[facility]  [address] [place] [address]  Phone: [phone]  Fax: [phone]  Primary Provider: Claudia Mitchell  Pre-op Performing Provider: KEYANA SANCHEZ      PREOPERATIVE EVALUATION:  Today's date: 10/4/2022    Nilsa Vazquez is a 51 year old female who presents for a preoperative evaluation.    Surgical Information:  Surgery/Procedure: TRANSVAGINAL SLING, WITH CYSTOSCOPY  Surgery Location: [facility]   Surgeon: Eduardo  Surgery Date: 10/17/22  Time of Surgery: 11 am   Where patient plans to recover: At home with family  Fax number for surgical facility: Note does not need to be faxed, will be available electronically in Epic.    Type of Anesthesia Anticipated: Choice    Assessment & Plan     The proposed surgical procedure is considered LOW risk.      ICD-10-CM    1. Preop general physical exam  Z01.818    2. Female stress incontinence  N39.3    3. Essential hypertension, benign  I10    4. 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        Subjective   HPI related to upcoming procedure:   51-year-old female presents for a preop physical exam.  Patient is scheduled to have transvaginal sling placement with cystoscopy on October 17, 2022.  She requires evaluation and anesthesia risk assessment prior to undergoing surgery/procedure.  Patient denies any fever, chills, sore throat, cough, shortness of breath, chest pain, palpitation, diarrhea, constipation, abdominal pain, headache or other relevant systemic symptoms.      Preop  Questions 10/1/2022   1. Have you ever had a heart attack or stroke? No   2. Have you ever had surgery on your heart or blood vessels, such as a stent placement, a coronary artery bypass, or surgery on an artery in your head, neck, heart, or legs? YES - brain surgery    3. Do you have chest pain with activity? No   4. Do you have a history of  heart failure? No   5. Do you currently have a cold, bronchitis or symptoms of other infection? No   6. Do you have a cough, shortness of breath, or wheezing? No   7. Do you or anyone in your family have previous history of blood clots? YES - brother    8. Do you or does anyone in your family have a serious bleeding problem such as prolonged bleeding following surgeries or cuts? No   9. Have you ever had problems with anemia or been told to take iron pills? No   10. Have you had any abnormal blood loss such as black, tarry or bloody stools, or abnormal vaginal bleeding? No   11. Have you ever had a blood transfusion? No   12. Are you willing to have a blood transfusion if it is medically needed before, during, or after your surgery? Yes   13. Have you or any of your relatives ever had problems with anesthesia? No   14. Do you have sleep apnea, excessive snoring or daytime drowsiness? No   15. Do you have any artifical heart valves or other implanted medical devices like a pacemaker, defibrillator, or continuous glucose monitor? No   16. Do you have artificial joints? No   17. Are you allergic to latex? No   18. Is there any chance that you may be pregnant? No       Health Care Directive:  Patient does not have a Health Care Directive or Living Will: Discussed advance care planning with patient; information given to patient to review.    Preoperative Review of :   reviewed - no record of controlled substances prescribed.      Status of Chronic Conditions:  See problem list for active medical problems.  Problems all longstanding and stable, except as noted/documented.  See  ROS for pertinent symptoms related to these conditions.      Review of Systems  CONSTITUTIONAL: NEGATIVE for fever, chills, change in weight  INTEGUMENTARY/SKIN: NEGATIVE for worrisome rashes, moles or lesions  EYES: NEGATIVE for vision changes or irritation  ENT/MOUTH: NEGATIVE for ear, mouth and throat problems  RESP: NEGATIVE for significant cough or SOB  CV: NEGATIVE for chest pain, palpitations or peripheral edema  GI: NEGATIVE for nausea, abdominal pain, heartburn, or change in bowel habits  : NEGATIVE for frequency, dysuria, or hematuria  MUSCULOSKELETAL: NEGATIVE for significant arthralgias or myalgia  NEURO: NEGATIVE for weakness, dizziness or paresthesias  ENDOCRINE: NEGATIVE for temperature intolerance, skin/hair changes  HEME: NEGATIVE for bleeding problems  PSYCHIATRIC: NEGATIVE for changes in mood or affect    Patient Active Problem List    Diagnosis Date Noted     ASCUS of cervix with negative high risk HPV 06/28/2019     Priority: Medium     2008 NIL Pap  2014 NIL Pap, Neg HPV  6/28/19 ASCUS Pap, Neg HPV. Plan cotest in 3 years.   7/27/22 ASCUS, neg HPV. 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     Priority: Medium     CARDIOVASCULAR SCREENING; LDL GOAL LESS THAN 160 10/31/2010     Priority: Medium     Essential hypertension, benign 08/24/2007     Priority: Medium     Persistent disorder of initiating or maintaining sleep 08/24/2007     Priority: Medium     RX monitoring program ([place]MP) reviewed:  reviewed-10/24/18    [place] profile:  [email or web address]         GLOSSOPHAR NERVE DYSFUNCTION 08/17/2006     Priority: Medium      Past Medical History:   Diagnosis Date     Abnormal Pap smear of cervix 2019    see problem list     Hypertension      Paraganglioma (H) 2007     Past Surgical History:   Procedure Laterality Date     craniotomy N/A 2007     ENT SURGERY  Feb       HEAD & NECK SURGERY  2007     ORTHOPEDIC SURGERY       SURGICAL HISTORY OF -    approx    Bilateral feet repair, with pins in place     VASCULAR SURGERY       Current Outpatient Medications   Medication Sig Dispense Refill     diphenhydrAMINE (BENADRYL) 25 MG capsule Take 25 mg by mouth every 6 hours as needed for itching or allergies       IBUPROFEN 200 MG OR TABS As needed       losartan (COZAAR) 50 MG tablet Take 2 tablets (100 mg) by mouth daily 180 tablet 3     multivitamin, therapeutic (THERA-VIT) TABS tablet Take 1 tablet by mouth daily       prochlorperazine (COMPAZINE) 10 MG tablet Take 1 tablet (10 mg) by mouth every 6 hours as needed for nausea or vomiting (migraine) 30 tablet 3     rizatriptan (MAXALT) 5 MG tablet Take 1 tablet (5 mg) by mouth at onset of headache for migraine (repeat in 2 hours if needed) Do not take if bp is >160/100. 18 tablet 11     topiramate (TOPAMAX) 25 MG tablet Take 3 tablets (75 mg) by mouth every evening 90 tablet 11     zolpidem (AMBIEN) 5 MG tablet Take 1 tablet (5 mg) by mouth nightly as needed for sleep 30 tablet 5       No Known Allergies     Social History     Tobacco Use     Smoking status: Former Smoker     Packs/day: 1.00     Years: 25.00     Pack years: 25.00     Types: Cigarettes     Quit date: 2005     Years since quittin.1     Smokeless tobacco: Never Used   Substance Use Topics     Alcohol use: Yes     Comment: occasionally     Family History   Problem Relation Age of Onset     Diabetes Mother         type 2     Hypertension Mother      Heart Failure Mother 75     Diabetes Father         type 2     Hypertension Father      Genitourinary Problems Father         kidney stones     Cancer Father 75        bladder cancer     Prostate Cancer Father      Breast Cancer Maternal Grandmother      Diabetes Maternal Grandmother      Hypertension Maternal Grandmother      Cerebrovascular Disease Maternal Grandmother      Hypertension Brother       "Blood Disease Brother         blood clot in leg     Colon Cancer Maternal Grandfather      History   Drug Use No         Objective     BP (!) 150/82 (Cuff Size: Adult Large)   Pulse 81   Temp 98.2  F (36.8  C) (Tympanic)   Resp 18   Ht 1.664 m (5' 5.5\")   Wt 109.8 kg (242 lb)   LMP 07/21/2022   SpO2 99%   Breastfeeding No   BMI 39.66 kg/m      Physical Exam    GENERAL APPEARANCE: healthy, alert and no distress     EYES: EOMI, PERRL     HENT: Left sided residual facial weakness     NECK: Surgical scars C/D/I      RESP: lungs clear to auscultation - no rales, rhonchi or wheezes     CV: regular rates and rhythm, normal S1 S2, no S3 or S4 and no murmur, click or rub     ABDOMEN:  soft, nontender, no HSM or masses and bowel sounds normal     MS: extremities normal- no gross deformities noted, no evidence of inflammation in joints, FROM in all extremities.     SKIN: no suspicious lesions or rashes     NEURO: Left-sided residual facial weakness, otherwise unremarkable     PSYCH: mentation appears normal. and affect normal/bright     LYMPHATICS: No cervical adenopathy    Recent Labs   Lab Test 07/27/22  0730 06/23/21  0759   HGB 15.2 14.2    313    137   POTASSIUM 3.8 4.1   CR 0.80 0.75   A1C  --  5.3        Diagnostics:  No labs were ordered during this visit.   No EKG required for low risk surgery (cataract, skin procedure, breast biopsy, etc).    Revised Cardiac Risk Index (RCRI):  The patient has the following serious cardiovascular risks for perioperative complications:   - No serious cardiac risks = 0 points     RCRI Interpretation: 0 points: Class I (very low risk - 0.4% complication rate)           Signed Electronically by: Oliverio Diaz MD  Copy of this evaluation report is provided to requesting physician.      "

## 2022-10-04 NOTE — H&P (VIEW-ONLY)
[facility]  [address] [place] [address]  Phone: [phone]  Fax: [phone]  Primary Provider: Claudia Mitchell  Pre-op Performing Provider: KEYANA SANCHEZ      PREOPERATIVE EVALUATION:  Today's date: 10/4/2022    Nilsa Vazquez is a 51 year old female who presents for a preoperative evaluation.    Surgical Information:  Surgery/Procedure: TRANSVAGINAL SLING, WITH CYSTOSCOPY  Surgery Location: [facility]   Surgeon: Eduadro  Surgery Date: 10/17/22  Time of Surgery: 11 am   Where patient plans to recover: At home with family  Fax number for surgical facility: Note does not need to be faxed, will be available electronically in Epic.    Type of Anesthesia Anticipated: Choice    Assessment & Plan     The proposed surgical procedure is considered LOW risk.      ICD-10-CM    1. Preop general physical exam  Z01.818    2. Female stress incontinence  N39.3    3. Essential hypertension, benign  I10    4. 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        Subjective   HPI related to upcoming procedure:   51-year-old female presents for a preop physical exam.  Patient is scheduled to have transvaginal sling placement with cystoscopy on October 17, 2022.  She requires evaluation and anesthesia risk assessment prior to undergoing surgery/procedure.  Patient denies any fever, chills, sore throat, cough, shortness of breath, chest pain, palpitation, diarrhea, constipation, abdominal pain, headache or other relevant systemic symptoms.      Preop  Questions 10/1/2022   1. Have you ever had a heart attack or stroke? No   2. Have you ever had surgery on your heart or blood vessels, such as a stent placement, a coronary artery bypass, or surgery on an artery in your head, neck, heart, or legs? YES - brain surgery    3. Do you have chest pain with activity? No   4. Do you have a history of  heart failure? No   5. Do you currently have a cold, bronchitis or symptoms of other infection? No   6. Do you have a cough, shortness of breath, or wheezing? No   7. Do you or anyone in your family have previous history of blood clots? YES - brother    8. Do you or does anyone in your family have a serious bleeding problem such as prolonged bleeding following surgeries or cuts? No   9. Have you ever had problems with anemia or been told to take iron pills? No   10. Have you had any abnormal blood loss such as black, tarry or bloody stools, or abnormal vaginal bleeding? No   11. Have you ever had a blood transfusion? No   12. Are you willing to have a blood transfusion if it is medically needed before, during, or after your surgery? Yes   13. Have you or any of your relatives ever had problems with anesthesia? No   14. Do you have sleep apnea, excessive snoring or daytime drowsiness? No   15. Do you have any artifical heart valves or other implanted medical devices like a pacemaker, defibrillator, or continuous glucose monitor? No   16. Do you have artificial joints? No   17. Are you allergic to latex? No   18. Is there any chance that you may be pregnant? No       Health Care Directive:  Patient does not have a Health Care Directive or Living Will: Discussed advance care planning with patient; information given to patient to review.    Preoperative Review of :   reviewed - no record of controlled substances prescribed.      Status of Chronic Conditions:  See problem list for active medical problems.  Problems all longstanding and stable, except as noted/documented.  See  ROS for pertinent symptoms related to these conditions.      Review of Systems  CONSTITUTIONAL: NEGATIVE for fever, chills, change in weight  INTEGUMENTARY/SKIN: NEGATIVE for worrisome rashes, moles or lesions  EYES: NEGATIVE for vision changes or irritation  ENT/MOUTH: NEGATIVE for ear, mouth and throat problems  RESP: NEGATIVE for significant cough or SOB  CV: NEGATIVE for chest pain, palpitations or peripheral edema  GI: NEGATIVE for nausea, abdominal pain, heartburn, or change in bowel habits  : NEGATIVE for frequency, dysuria, or hematuria  MUSCULOSKELETAL: NEGATIVE for significant arthralgias or myalgia  NEURO: NEGATIVE for weakness, dizziness or paresthesias  ENDOCRINE: NEGATIVE for temperature intolerance, skin/hair changes  HEME: NEGATIVE for bleeding problems  PSYCHIATRIC: NEGATIVE for changes in mood or affect    Patient Active Problem List    Diagnosis Date Noted     ASCUS of cervix with negative high risk HPV 06/28/2019     Priority: Medium     2008 NIL Pap  2014 NIL Pap, Neg HPV  6/28/19 ASCUS Pap, Neg HPV. Plan cotest in 3 years.   7/27/22 ASCUS, neg HPV. 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     Priority: Medium     CARDIOVASCULAR SCREENING; LDL GOAL LESS THAN 160 10/31/2010     Priority: Medium     Essential hypertension, benign 08/24/2007     Priority: Medium     Persistent disorder of initiating or maintaining sleep 08/24/2007     Priority: Medium     RX monitoring program (MNPMP) reviewed:  reviewed-10/24/18    MNPMP profile:  [email or web address]         GLOSSOPHAR NERVE DYSFUNCTION 08/17/2006     Priority: Medium      Past Medical History:   Diagnosis Date     Abnormal Pap smear of cervix 2019    see problem list     Hypertension      Paraganglioma (H) 2007     Past Surgical History:   Procedure Laterality Date     craniotomy N/A 2007     ENT SURGERY  Feb       HEAD & NECK SURGERY  2007     ORTHOPEDIC SURGERY       SURGICAL HISTORY OF -    approx    Bilateral feet repair, with pins in place     VASCULAR SURGERY       Current Outpatient Medications   Medication Sig Dispense Refill     diphenhydrAMINE (BENADRYL) 25 MG capsule Take 25 mg by mouth every 6 hours as needed for itching or allergies       IBUPROFEN 200 MG OR TABS As needed       losartan (COZAAR) 50 MG tablet Take 2 tablets (100 mg) by mouth daily 180 tablet 3     multivitamin, therapeutic (THERA-VIT) TABS tablet Take 1 tablet by mouth daily       prochlorperazine (COMPAZINE) 10 MG tablet Take 1 tablet (10 mg) by mouth every 6 hours as needed for nausea or vomiting (migraine) 30 tablet 3     rizatriptan (MAXALT) 5 MG tablet Take 1 tablet (5 mg) by mouth at onset of headache for migraine (repeat in 2 hours if needed) Do not take if bp is >160/100. 18 tablet 11     topiramate (TOPAMAX) 25 MG tablet Take 3 tablets (75 mg) by mouth every evening 90 tablet 11     zolpidem (AMBIEN) 5 MG tablet Take 1 tablet (5 mg) by mouth nightly as needed for sleep 30 tablet 5       No Known Allergies     Social History     Tobacco Use     Smoking status: Former Smoker     Packs/day: 1.00     Years: 25.00     Pack years: 25.00     Types: Cigarettes     Quit date: 2005     Years since quittin.1     Smokeless tobacco: Never Used   Substance Use Topics     Alcohol use: Yes     Comment: occasionally     Family History   Problem Relation Age of Onset     Diabetes Mother         type 2     Hypertension Mother      Heart Failure Mother 75     Diabetes Father         type 2     Hypertension Father      Genitourinary Problems Father         kidney stones     Cancer Father 75        bladder cancer     Prostate Cancer Father      Breast Cancer Maternal Grandmother      Diabetes Maternal Grandmother      Hypertension Maternal Grandmother      Cerebrovascular Disease Maternal Grandmother      Hypertension Brother       "Blood Disease Brother         blood clot in leg     Colon Cancer Maternal Grandfather      History   Drug Use No         Objective     BP (!) 150/82 (Cuff Size: Adult Large)   Pulse 81   Temp 98.2  F (36.8  C) (Tympanic)   Resp 18   Ht 1.664 m (5' 5.5\")   Wt 109.8 kg (242 lb)   LMP 07/21/2022   SpO2 99%   Breastfeeding No   BMI 39.66 kg/m      Physical Exam    GENERAL APPEARANCE: healthy, alert and no distress     EYES: EOMI, PERRL     HENT: Left sided residual facial weakness     NECK: Surgical scars C/D/I      RESP: lungs clear to auscultation - no rales, rhonchi or wheezes     CV: regular rates and rhythm, normal S1 S2, no S3 or S4 and no murmur, click or rub     ABDOMEN:  soft, nontender, no HSM or masses and bowel sounds normal     MS: extremities normal- no gross deformities noted, no evidence of inflammation in joints, FROM in all extremities.     SKIN: no suspicious lesions or rashes     NEURO: Left-sided residual facial weakness, otherwise unremarkable     PSYCH: mentation appears normal. and affect normal/bright     LYMPHATICS: No cervical adenopathy    Recent Labs   Lab Test 07/27/22  0730 06/23/21  0759   HGB 15.2 14.2    313    137   POTASSIUM 3.8 4.1   CR 0.80 0.75   A1C  --  5.3        Diagnostics:  No labs were ordered during this visit.   No EKG required for low risk surgery (cataract, skin procedure, breast biopsy, etc).    Revised Cardiac Risk Index (RCRI):  The patient has the following serious cardiovascular risks for perioperative complications:   - No serious cardiac risks = 0 points     RCRI Interpretation: 0 points: Class I (very low risk - 0.4% complication rate)           Signed Electronically by: Oliverio Diaz MD  Copy of this evaluation report is provided to requesting physician.      "

## 2022-10-14 ENCOUNTER — ANESTHESIA EVENT (OUTPATIENT)
Dept: SURGERY | Facility: CLINIC | Age: 52
End: 2022-10-14
Payer: COMMERCIAL

## 2022-10-14 ASSESSMENT — LIFESTYLE VARIABLES: TOBACCO_USE: 1

## 2022-10-14 NOTE — ANESTHESIA PREPROCEDURE EVALUATION
Anesthesia Pre-Procedure Evaluation    Patient: Nilsa Vazquez   MRN: [id number] : 1970        Procedure : Procedure(s):  TRANSVAGINAL SLING, WITH CYSTOSCOPY          Past Medical History:   Diagnosis Date     Abnormal Pap smear of cervix     see problem list     Hypertension      Paraganglioma (H)       Past Surgical History:   Procedure Laterality Date     craniotomy N/A      ENT SURGERY  2007     HEAD & NECK SURGERY  2007     ORTHOPEDIC SURGERY       SURGICAL HISTORY OF -    approx    Bilateral feet repair, with pins in place     VASCULAR SURGERY        No Known Allergies   Social History     Tobacco Use     Smoking status: Former     Packs/day: 1.00     Years: 25.00     Pack years: 25.00     Types: Cigarettes     Quit date: 2005     Years since quittin.2     Smokeless tobacco: Never   Substance Use Topics     Alcohol use: Yes     Comment: occasionally      Wt Readings from Last 1 Encounters:   10/04/22 109.8 kg (242 lb)        Anesthesia Evaluation   Pt has had prior anesthetic. Type: General and MAC.    No history of anesthetic complications       ROS/MED HX  ENT/Pulmonary:     (+) KAIN risk factors, hypertension, obese, tobacco use, Past use,     Neurologic: Comment: Dysphagia and mouth opening issues from previous brain tumor      Cardiovascular:     (+) hypertension-----    METS/Exercise Tolerance: >4 METS    Hematologic:  - neg hematologic  ROS     Musculoskeletal:       GI/Hepatic:  - neg GI/hepatic ROS     Renal/Genitourinary:  - neg Renal ROS     Endo:     (+) Obesity,     Psychiatric/Substance Use:       Infectious Disease:       Malignancy:       Other:            Physical Exam    Airway  airway exam normal      Mallampati: III   TM distance: > 3 FB   Neck ROM: full   Mouth opening: < 3 cm    Respiratory Devices and Support         Dental  no notable dental history     (+) upper dentures and lower dentures      Cardiovascular   cardiovascular exam normal           Pulmonary   pulmonary exam normal                OUTSIDE LABS:  CBC:   Lab Results   Component Value Date    WBC 12.1 (H) 07/27/2022    WBC 10.8 06/23/2021    HGB 15.2 07/27/2022    HGB 14.2 06/23/2021    HCT 44.4 07/27/2022    HCT 41.2 06/23/2021     07/27/2022     06/23/2021     BMP:   Lab Results   Component Value Date     07/27/2022     06/23/2021    POTASSIUM 3.8 07/27/2022    POTASSIUM 4.1 06/23/2021    CHLORIDE 108 07/27/2022    CHLORIDE 104 06/23/2021    CO2 26 07/27/2022    CO2 23 06/23/2021    BUN 13 07/27/2022    BUN 13 06/23/2021    CR 0.80 07/27/2022    CR 0.75 06/23/2021     (H) 07/27/2022     (H) 06/23/2021     COAGS:   Lab Results   Component Value Date    PTT 27 02/06/2007    INR 1.02 02/06/2007     POC:   Lab Results   Component Value Date     (H) 02/08/2007     HEPATIC:   Lab Results   Component Value Date    ALBUMIN 3.6 07/27/2022    PROTTOTAL 7.6 07/27/2022    ALT 33 07/27/2022    AST 16 07/27/2022    ALKPHOS 122 07/27/2022    BILITOTAL 0.6 07/27/2022     OTHER:   Lab Results   Component Value Date    PH 7.44 02/08/2007    A1C 5.3 06/23/2021    STEVE 8.9 07/27/2022    PHOS 2.8 02/10/2007    MAG 2.1 06/21/2011    TSH 2.36 07/27/2022    CRP 13.1 (H) 10/16/2007    SED 32 (H) 10/16/2007       Anesthesia Plan    ASA Status:  3   NPO Status:  NPO Appropriate    Anesthesia Type: General.     - Airway: ETT   Induction: Intravenous.   Maintenance: Inhalation.        Consents    Anesthesia Plan(s) and associated risks, benefits, and realistic alternatives discussed. Questions answered and patient/representative(s) expressed understanding.     - Discussed: Risks, Benefits and Alternatives for BOTH SEDATION and the PROCEDURE were discussed     - Discussed with:  Patient      - Extended Intubation/Ventilatory Support Discussed: No.      - Patient is DNR/DNI Status: No    Use of blood products discussed: No .     Postoperative Care    Pain management: IV  analgesics, Oral pain medications.   PONV prophylaxis: Ondansetron (or other 5HT-3), Dexamethasone or Solumedrol     Comments:                DIMAS Starkey CRNA

## 2022-10-17 ENCOUNTER — ANESTHESIA (OUTPATIENT)
Dept: SURGERY | Facility: CLINIC | Age: 52
End: 2022-10-17
Payer: COMMERCIAL

## 2022-10-17 ENCOUNTER — HOSPITAL ENCOUNTER (OUTPATIENT)
Facility: CLINIC | Age: 52
Discharge: HOME | End: 2022-10-17
Attending: OBSTETRICS & GYNECOLOGY | Admitting: OBSTETRICS & GYNECOLOGY
Payer: COMMERCIAL

## 2022-10-17 VITALS
TEMPERATURE: 98.1 F | RESPIRATION RATE: 15 BRPM | OXYGEN SATURATION: 97 % | SYSTOLIC BLOOD PRESSURE: 165 MMHG | BODY MASS INDEX: 38.89 KG/M2 | DIASTOLIC BLOOD PRESSURE: 84 MMHG | HEIGHT: 66 IN | WEIGHT: 242 LBS | HEART RATE: 73 BPM

## 2022-10-17 DIAGNOSIS — Z98.890 HISTORY OF SUBURETHRAL SLING PROCEDURE: Primary | ICD-10-CM

## 2022-10-17 PROCEDURE — 250N000009 HC RX 250: Performed by: NURSE ANESTHETIST, CERTIFIED REGISTERED

## 2022-10-17 PROCEDURE — 57288 REPAIR BLADDER DEFECT: CPT | Performed by: OBSTETRICS & GYNECOLOGY

## 2022-10-17 PROCEDURE — 272N000001 HC OR GENERAL SUPPLY STERILE: Performed by: OBSTETRICS & GYNECOLOGY

## 2022-10-17 PROCEDURE — 258N000003 HC RX IP 258 OP 636: Performed by: NURSE ANESTHETIST, CERTIFIED REGISTERED

## 2022-10-17 PROCEDURE — C1771 REP DEV, URINARY, W/SLING: HCPCS | Performed by: OBSTETRICS & GYNECOLOGY

## 2022-10-17 PROCEDURE — 710N000012 HC RECOVERY PHASE 2, PER MINUTE: Performed by: OBSTETRICS & GYNECOLOGY

## 2022-10-17 PROCEDURE — 250N000009 HC RX 250: Performed by: OBSTETRICS & GYNECOLOGY

## 2022-10-17 PROCEDURE — 250N000011 HC RX IP 250 OP 636: Performed by: OBSTETRICS & GYNECOLOGY

## 2022-10-17 PROCEDURE — 250N000025 HC SEVOFLURANE, PER MIN: Performed by: OBSTETRICS & GYNECOLOGY

## 2022-10-17 PROCEDURE — 250N000011 HC RX IP 250 OP 636: Performed by: NURSE ANESTHETIST, CERTIFIED REGISTERED

## 2022-10-17 PROCEDURE — 370N000017 HC ANESTHESIA TECHNICAL FEE, PER MIN: Performed by: OBSTETRICS & GYNECOLOGY

## 2022-10-17 PROCEDURE — 710N000009 HC RECOVERY PHASE 1, LEVEL 1, PER MIN: Performed by: OBSTETRICS & GYNECOLOGY

## 2022-10-17 PROCEDURE — 999N000141 HC STATISTIC PRE-PROCEDURE NURSING ASSESSMENT: Performed by: OBSTETRICS & GYNECOLOGY

## 2022-10-17 PROCEDURE — 57288 REPAIR BLADDER DEFECT: CPT | Mod: AS | Performed by: PHYSICIAN ASSISTANT

## 2022-10-17 PROCEDURE — 360N000076 HC SURGERY LEVEL 3, PER MIN: Performed by: OBSTETRICS & GYNECOLOGY

## 2022-10-17 DEVICE — MESH SLING ADVANTAGE MID URETHERAL BLUE M0068502120: Type: IMPLANTABLE DEVICE | Site: VAGINA | Status: FUNCTIONAL

## 2022-10-17 RX ORDER — FENTANYL CITRATE 50 UG/ML
50 INJECTION, SOLUTION INTRAMUSCULAR; INTRAVENOUS EVERY 5 MIN PRN
Status: DISCONTINUED | OUTPATIENT
Start: 2022-10-17 | End: 2022-10-17 | Stop reason: HOSPADM

## 2022-10-17 RX ORDER — LIDOCAINE HYDROCHLORIDE 10 MG/ML
INJECTION, SOLUTION INFILTRATION; PERINEURAL PRN
Status: DISCONTINUED | OUTPATIENT
Start: 2022-10-17 | End: 2022-10-17

## 2022-10-17 RX ORDER — MAGNESIUM SULFATE HEPTAHYDRATE 500 MG/ML
INJECTION, SOLUTION INTRAMUSCULAR; INTRAVENOUS PRN
Status: DISCONTINUED | OUTPATIENT
Start: 2022-10-17 | End: 2022-10-17

## 2022-10-17 RX ORDER — ACETAMINOPHEN 325 MG/1
975 TABLET ORAL EVERY 6 HOURS PRN
Qty: 50 TABLET | Refills: 0 | COMMUNITY
Start: 2022-10-17 | End: 2023-09-01

## 2022-10-17 RX ORDER — NALOXONE HYDROCHLORIDE 0.4 MG/ML
0.2 INJECTION, SOLUTION INTRAMUSCULAR; INTRAVENOUS; SUBCUTANEOUS
Status: DISCONTINUED | OUTPATIENT
Start: 2022-10-17 | End: 2022-10-17 | Stop reason: HOSPADM

## 2022-10-17 RX ORDER — PROPOFOL 10 MG/ML
INJECTION, EMULSION INTRAVENOUS PRN
Status: DISCONTINUED | OUTPATIENT
Start: 2022-10-17 | End: 2022-10-17

## 2022-10-17 RX ORDER — NALOXONE HYDROCHLORIDE 0.4 MG/ML
0.4 INJECTION, SOLUTION INTRAMUSCULAR; INTRAVENOUS; SUBCUTANEOUS
Status: DISCONTINUED | OUTPATIENT
Start: 2022-10-17 | End: 2022-10-17 | Stop reason: HOSPADM

## 2022-10-17 RX ORDER — OXYCODONE HYDROCHLORIDE 5 MG/1
5-10 TABLET ORAL EVERY 4 HOURS PRN
Qty: 6 TABLET | Refills: 0 | Status: SHIPPED | OUTPATIENT
Start: 2022-10-17 | End: 2023-09-01

## 2022-10-17 RX ORDER — IBUPROFEN 200 MG
800 TABLET ORAL EVERY 6 HOURS PRN
COMMUNITY
Start: 2022-10-17 | End: 2023-09-01

## 2022-10-17 RX ORDER — ACETAMINOPHEN 325 MG/1
975 TABLET ORAL ONCE
Status: DISCONTINUED | OUTPATIENT
Start: 2022-10-17 | End: 2022-10-17 | Stop reason: HOSPADM

## 2022-10-17 RX ORDER — LIDOCAINE 40 MG/G
CREAM TOPICAL
Status: DISCONTINUED | OUTPATIENT
Start: 2022-10-17 | End: 2022-10-17 | Stop reason: HOSPADM

## 2022-10-17 RX ORDER — PHENAZOPYRIDINE HYDROCHLORIDE 200 MG/1
200 TABLET, FILM COATED ORAL ONCE
Status: DISCONTINUED | OUTPATIENT
Start: 2022-10-17 | End: 2022-10-17 | Stop reason: HOSPADM

## 2022-10-17 RX ORDER — SODIUM CHLORIDE, SODIUM LACTATE, POTASSIUM CHLORIDE, CALCIUM CHLORIDE 600; 310; 30; 20 MG/100ML; MG/100ML; MG/100ML; MG/100ML
INJECTION, SOLUTION INTRAVENOUS CONTINUOUS
Status: DISCONTINUED | OUTPATIENT
Start: 2022-10-17 | End: 2022-10-17 | Stop reason: HOSPADM

## 2022-10-17 RX ORDER — HYDROXYZINE HYDROCHLORIDE 25 MG/1
25 TABLET, FILM COATED ORAL
Status: DISCONTINUED | OUTPATIENT
Start: 2022-10-17 | End: 2022-10-17 | Stop reason: HOSPADM

## 2022-10-17 RX ORDER — CEFAZOLIN SODIUM/WATER 2 G/20 ML
2 SYRINGE (ML) INTRAVENOUS
Status: DISCONTINUED | OUTPATIENT
Start: 2022-10-17 | End: 2022-10-17 | Stop reason: HOSPADM

## 2022-10-17 RX ORDER — ATROPA BELLADONNA AND OPIUM 16.2; 3 MG/1; MG/1
SUPPOSITORY RECTAL PRN
Status: DISCONTINUED | OUTPATIENT
Start: 2022-10-17 | End: 2022-10-17 | Stop reason: HOSPADM

## 2022-10-17 RX ORDER — ONDANSETRON 2 MG/ML
4 INJECTION INTRAMUSCULAR; INTRAVENOUS EVERY 30 MIN PRN
Status: DISCONTINUED | OUTPATIENT
Start: 2022-10-17 | End: 2022-10-17 | Stop reason: HOSPADM

## 2022-10-17 RX ORDER — FENTANYL CITRATE 50 UG/ML
INJECTION, SOLUTION INTRAMUSCULAR; INTRAVENOUS PRN
Status: DISCONTINUED | OUTPATIENT
Start: 2022-10-17 | End: 2022-10-17

## 2022-10-17 RX ORDER — ONDANSETRON 4 MG/1
4 TABLET, ORALLY DISINTEGRATING ORAL EVERY 30 MIN PRN
Status: DISCONTINUED | OUTPATIENT
Start: 2022-10-17 | End: 2022-10-17 | Stop reason: HOSPADM

## 2022-10-17 RX ORDER — OXYCODONE HYDROCHLORIDE 5 MG/1
5 TABLET ORAL EVERY 4 HOURS PRN
Status: DISCONTINUED | OUTPATIENT
Start: 2022-10-17 | End: 2022-10-17 | Stop reason: HOSPADM

## 2022-10-17 RX ORDER — HYDROMORPHONE HCL IN WATER/PF 6 MG/30 ML
0.4 PATIENT CONTROLLED ANALGESIA SYRINGE INTRAVENOUS EVERY 5 MIN PRN
Status: DISCONTINUED | OUTPATIENT
Start: 2022-10-17 | End: 2022-10-17 | Stop reason: HOSPADM

## 2022-10-17 RX ORDER — MEPERIDINE HYDROCHLORIDE 25 MG/ML
12.5 INJECTION INTRAMUSCULAR; INTRAVENOUS; SUBCUTANEOUS
Status: DISCONTINUED | OUTPATIENT
Start: 2022-10-17 | End: 2022-10-17 | Stop reason: HOSPADM

## 2022-10-17 RX ORDER — ONDANSETRON 2 MG/ML
INJECTION INTRAMUSCULAR; INTRAVENOUS PRN
Status: DISCONTINUED | OUTPATIENT
Start: 2022-10-17 | End: 2022-10-17

## 2022-10-17 RX ORDER — BUPIVACAINE HYDROCHLORIDE AND EPINEPHRINE 5; 5 MG/ML; UG/ML
INJECTION, SOLUTION PERINEURAL PRN
Status: DISCONTINUED | OUTPATIENT
Start: 2022-10-17 | End: 2022-10-17 | Stop reason: HOSPADM

## 2022-10-17 RX ORDER — IBUPROFEN 200 MG
800 TABLET ORAL ONCE
Status: DISCONTINUED | OUTPATIENT
Start: 2022-10-17 | End: 2022-10-17 | Stop reason: HOSPADM

## 2022-10-17 RX ORDER — OXYCODONE HYDROCHLORIDE 5 MG/1
5 TABLET ORAL
Status: DISCONTINUED | OUTPATIENT
Start: 2022-10-17 | End: 2022-10-17 | Stop reason: HOSPADM

## 2022-10-17 RX ORDER — FENTANYL CITRATE 50 UG/ML
25 INJECTION, SOLUTION INTRAMUSCULAR; INTRAVENOUS
Status: DISCONTINUED | OUTPATIENT
Start: 2022-10-17 | End: 2022-10-17 | Stop reason: HOSPADM

## 2022-10-17 RX ORDER — DEXAMETHASONE SODIUM PHOSPHATE 10 MG/ML
INJECTION, SOLUTION INTRAMUSCULAR; INTRAVENOUS PRN
Status: DISCONTINUED | OUTPATIENT
Start: 2022-10-17 | End: 2022-10-17

## 2022-10-17 RX ORDER — CEFAZOLIN SODIUM/WATER 2 G/20 ML
2 SYRINGE (ML) INTRAVENOUS SEE ADMIN INSTRUCTIONS
Status: DISCONTINUED | OUTPATIENT
Start: 2022-10-17 | End: 2022-10-17 | Stop reason: HOSPADM

## 2022-10-17 RX ADMIN — LIDOCAINE HYDROCHLORIDE 100 MG: 10 INJECTION, SOLUTION INFILTRATION; PERINEURAL at 10:47

## 2022-10-17 RX ADMIN — SODIUM CHLORIDE, POTASSIUM CHLORIDE, SODIUM LACTATE AND CALCIUM CHLORIDE: 600; 310; 30; 20 INJECTION, SOLUTION INTRAVENOUS at 12:11

## 2022-10-17 RX ADMIN — LIDOCAINE HYDROCHLORIDE 0.1 ML: 10 INJECTION, SOLUTION EPIDURAL; INFILTRATION; INTRACAUDAL; PERINEURAL at 10:38

## 2022-10-17 RX ADMIN — FENTANYL CITRATE 50 MCG: 50 INJECTION, SOLUTION INTRAMUSCULAR; INTRAVENOUS at 12:09

## 2022-10-17 RX ADMIN — FENTANYL CITRATE 100 MCG: 50 INJECTION, SOLUTION INTRAMUSCULAR; INTRAVENOUS at 10:47

## 2022-10-17 RX ADMIN — Medication 2 G: at 10:40

## 2022-10-17 RX ADMIN — MEPERIDINE HYDROCHLORIDE 12.5 MG: 25 INJECTION INTRAMUSCULAR; INTRAVENOUS; SUBCUTANEOUS at 11:53

## 2022-10-17 RX ADMIN — PROPOFOL 50 MG: 10 INJECTION, EMULSION INTRAVENOUS at 11:15

## 2022-10-17 RX ADMIN — DEXAMETHASONE SODIUM PHOSPHATE 10 MG: 10 INJECTION, SOLUTION INTRAMUSCULAR; INTRAVENOUS at 10:47

## 2022-10-17 RX ADMIN — SODIUM CHLORIDE, POTASSIUM CHLORIDE, SODIUM LACTATE AND CALCIUM CHLORIDE 1000 ML: 600; 310; 30; 20 INJECTION, SOLUTION INTRAVENOUS at 10:38

## 2022-10-17 RX ADMIN — PROPOFOL 250 MG: 10 INJECTION, EMULSION INTRAVENOUS at 10:47

## 2022-10-17 RX ADMIN — ONDANSETRON 4 MG: 2 INJECTION INTRAMUSCULAR; INTRAVENOUS at 10:47

## 2022-10-17 RX ADMIN — SODIUM CHLORIDE, POTASSIUM CHLORIDE, SODIUM LACTATE AND CALCIUM CHLORIDE: 600; 310; 30; 20 INJECTION, SOLUTION INTRAVENOUS at 10:47

## 2022-10-17 RX ADMIN — MAGNESIUM SULFATE HEPTAHYDRATE 2 G: 500 INJECTION, SOLUTION INTRAMUSCULAR; INTRAVENOUS at 10:51

## 2022-10-17 ASSESSMENT — ACTIVITIES OF DAILY LIVING (ADL)
ADLS_ACUITY_SCORE: 35
ADLS_ACUITY_SCORE: 35

## 2022-10-17 NOTE — ANESTHESIA CARE TRANSFER NOTE
Patient: Nilsa Vazquez    Procedure: Procedure(s):  TRANSVAGINAL SLING, WITH CYSTOSCOPY       Diagnosis: WILMAN (stress urinary incontinence, female) [N39.3]  Diagnosis Additional Information: No value filed.    Anesthesia Type:   General     Note:    Oropharynx: oropharynx clear of all foreign objects and spontaneously breathing  Level of Consciousness: awake  Oxygen Supplementation: room air    Independent Airway: airway patency satisfactory and stable  Dentition: dentition unchanged  Vital Signs Stable: post-procedure vital signs reviewed and stable  Report to RN Given: handoff report given  Patient transferred to: PACU    Handoff Report: Identifed the Patient, Identified the Reponsible Provider, Reviewed the pertinent medical history, Discussed the surgical course, Reviewed Intra-OP anesthesia mangement and issues during anesthesia, Set expectations for post-procedure period and Allowed opportunity for questions and acknowledgement of understanding      Vitals:  Vitals Value Taken Time   BP     Temp     Pulse     Resp     SpO2 94 % 10/17/22 1139   Vitals shown include unvalidated device data.    Electronically Signed By: DIMAS Starkey CRNA  October 17, 2022  11:40 AM

## 2022-10-17 NOTE — INTERVAL H&P NOTE
"I have reviewed the surgical (or preoperative) H&P that is linked to this encounter, and examined the patient. There are no significant changes    Clinical Conditions Present on Arrival:  Clinically Significant Risk Factors Present on Admission                   # Obesity: Estimated body mass index is 39.66 kg/m  as calculated from the following:    Height as of 10/4/22: 1.664 m (5' 5.5\").    Weight as of 10/4/22: 109.8 kg (242 lb).       "

## 2022-10-17 NOTE — DISCHARGE INSTRUCTIONS
Same Day Surgery Discharge Instructions  Special Precautions After Surgery - Adult    It is not unusual to feel lightheaded or faint, up to 24 hours after surgery or while taking pain medication.  If you have these symptoms; sit for a few minutes before standing and have someone assist you when getting up.  You should rest and relax for the next 24 hours and must have someone stay with you for at least 24 hours after your discharge.  DO NOT DRIVE any vehicle or operate mechanical equipment for 24 hours following the end of your surgery.  DO NOT DRIVE while taking narcotic pain medications that have been prescribed by your physician.  If you had a limb operated on, you must be able to use it fully to drive.  DO NOT drink alcoholic beverages for 24 hours following surgery or while taking prescription pain medication.  Drink clear liquids (apple juice, ginger ale, broth, 7-Up, etc.).  Progress to your regular diet as you feel able.  Any questions call your physician and do not make important decisions for 24 hours.    ACTIVITY  Rest today.  No activity or diet restrictions.     INCISIONAL CARE  Be alert for signs of infection:  redness, swelling, heat, drainage of pus, and/or elevated temperature.  Contact your doctor if these occur.          __________________________________________________________________________________________________________________________________  IMPORTANT NUMBERS:    [facility] Main Number:  [phone], [phone]  Pharmacy:  [phone]  Same Day Surgery:  [phone], Monday - Friday until 8:30 p.m.   Clinic:  [phone]

## 2022-10-17 NOTE — ANESTHESIA POSTPROCEDURE EVALUATION
Patient: Nilsa Vazquez    Procedure: Procedure(s):  TRANSVAGINAL SLING, WITH CYSTOSCOPY       Anesthesia Type:  General    Note:  Disposition: Outpatient   Postop Pain Control: Uneventful            Sign Out: Well controlled pain   PONV: No   Neuro/Psych: Uneventful            Sign Out: Acceptable/Baseline neuro status   Airway/Respiratory: Uneventful            Sign Out: Acceptable/Baseline resp. status   CV/Hemodynamics: Uneventful            Sign Out: Acceptable CV status; No obvious hypovolemia; No obvious fluid overload   Other NRE: NONE   DID A NON-ROUTINE EVENT OCCUR? No           Last vitals:  Vitals Value Taken Time   /85 10/17/22 1204   Temp 36.7  C (98.1  F) 10/17/22 1140   Pulse 70 10/17/22 1209   Resp 17 10/17/22 1209   SpO2 97 % 10/17/22 1209   Vitals shown include unvalidated device data.    Electronically Signed By: DIMAS Starkey CRNA  October 17, 2022  12:10 PM

## 2022-10-17 NOTE — OP NOTE
[facility] Gynecology  Operative Note    Pre-operative diagnosis: WILMAN (stress urinary incontinence, female) [N39.3]   Post-operative diagnosis: Same   Procedure: Suburethral sling  Cystoscopy   Surgeon: Homar Clark MD   Assistant(s): Tavo Gibbons PA-C  A surgical assistant was required for this surgery for his experience with retraction, achievement of hemostasis, and wound closure     Anesthesia: General LMA Anesthesia   Estimated blood loss: 15 ml   Total IV fluids: (See anesthesia record)  700ml   Blood transfusion: No transfusion was given during surgery   Total urine output: (See anesthesia record)  200ml   Drains: Grande catheter   Specimens: None   Findings: Urethral hypermobility  Normal bladder mucosa and bilateral ureteral efflux   Complications: None   Condition: Stable  Nilsa Vazquez   1970  [id number]      Nilsa Vazquez  presented for the above procedure.  She has urinary incontinence and urethral hypermobility, is s/p menopause  I met with Nilsa and discussed the planned procedure as well as the expected post operative course.  Risks of complications were noted and postoperative signs to watch for outlined.  Questions were answered and consent signed.  She was taken to the OR [facility] where she was placed in the supine position. She underwent General anesthesia with LMA.  She was then placed in the Dorso-lithotomy position in [facility].  An examination under anesthesia was performed that showed: urethra in the midposition.    She was prepped and draped.  A timeout was held confirming her identity and proposed procedures. All were in agreement.     A Oro retractor was placed in the posterior fornix.  A Grande catheter was placed into the bladder and the balloon filled to delineate the mid urethra.  1% lidocaine with a dilute solution of vasopressin was instilled in the midline of the vaginal mucosa underlying the urethra.  Allis clamps were placed  bilaterally.  Blunt dissection was performed up to the urogenital diaphragm through each of the size of the incision.    Attention was then turned to the suprapubic area.  The pubic symphysis was palpated and 3 cm lateral, an 18-gauge spinal needle was placed through the skin and directed down subcu cutaneously to the space of Retzius which was palpable through the vagina.  50 cc of sterile saline was injected on each side in preparation for passage of the sling arms.  The TVT sling arms were then placed from the vagina to the suprapubic area.  The Grande catheter was removed from the bladder and the cystoscopy was performed.  Bladder was normal in appearance with brisk flow of Pyridium stained urine bilaterally.  Bladder was then drained and the cystoscope was used to protect the urethra with ipsilateral passage of the sling arm.  After passage of the arms cystoscopy was  Performed documenting of no injury to the bladder.  Once this was done bilaterally, a #8 Hegar dilator was placed between the sling apex and the urethra.  A Grande catheter was placed into the bladder to also provide a buffer so that the sling could be placed under no tension.  Sling arms were elevated in the plastic covering removed.  Once the dilator was removed from its interposed position, it retracted below the mucosal level.    The suburethral mucosa was then closed with running 4-0 Vicryl suture.  The sling arms were trimmed at the skin and Dermabond applied.    All vaginal instruments removed.  Sponge needle counts were correct.  She did tolerate the procedure well returned to the PACU in good condition.      Homar Clark MD          Comments: .

## 2022-11-08 ENCOUNTER — IMMUNIZATION (OUTPATIENT)
Dept: FAMILY MEDICINE | Facility: CLINIC | Age: 52
End: 2022-11-08
Payer: COMMERCIAL

## 2022-11-08 DIAGNOSIS — Z23 NEED FOR PROPHYLACTIC VACCINATION AND INOCULATION AGAINST INFLUENZA: ICD-10-CM

## 2022-11-08 DIAGNOSIS — Z23 NEED FOR VACCINATION: ICD-10-CM

## 2022-11-08 PROCEDURE — 90471 IMMUNIZATION ADMIN: CPT

## 2022-11-08 PROCEDURE — 90472 IMMUNIZATION ADMIN EACH ADD: CPT

## 2022-11-08 PROCEDURE — 90682 RIV4 VACC RECOMBINANT DNA IM: CPT

## 2022-11-08 PROCEDURE — 90750 HZV VACC RECOMBINANT IM: CPT

## 2022-11-08 PROCEDURE — 99207 PR NO CHARGE LOS: CPT

## 2022-11-19 ENCOUNTER — HEALTH MAINTENANCE LETTER (OUTPATIENT)
Age: 52
End: 2022-11-19

## 2023-02-04 DIAGNOSIS — G47.00 PERSISTENT DISORDER OF INITIATING OR MAINTAINING SLEEP: ICD-10-CM

## 2023-02-06 RX ORDER — ZOLPIDEM TARTRATE 5 MG/1
TABLET ORAL
Qty: 30 TABLET | Refills: 5 | Status: SHIPPED | OUTPATIENT
Start: 2023-02-06 | End: 2023-07-25

## 2023-05-21 ASSESSMENT — HEADACHE IMPACT TEST (HIT 6)
HOW OFTEN DO HEADACHES LIMIT YOUR DAILY ACTIVITIES: RARELY
WHEN YOU HAVE HEADACHES HOW OFTEN IS THE PAIN SEVERE: RARELY
HIT6 TOTAL SCORE: 48
HOW OFTEN HAVE YOU FELT FED UP OR IRRITATED BECAUSE OF YOUR HEADACHES: RARELY
HOW OFTEN HAVE YOU FELT TOO TIRED TO WORK BECAUSE OF YOUR HEADACHES: RARELY
HOW OFTEN DID HEADACHS LIMIT CONCENTRATION ON WORK OR DAILY ACTIVITY: RARELY
WHEN YOU HAVE A HEADACHE HOW OFTEN DO YOU WISH YOU COULD LIE DOWN: RARELY

## 2023-05-21 ASSESSMENT — MIGRAINE DISABILITY ASSESSMENT (MIDAS)
TOTAL SCORE: 14
HOW OFTEN WERE SOCIAL ACTIVITIES MISSED DUE TO HEADACHES: 3
HOW MANY DAYS WAS HOUSEWORK PRODUCTIVITY CUT IN HALF DUE TO HEADACHES: 3
HOW MANY DAYS DID YOU MISS WORK OR SCHOOL BECAUSE OF HEADACHES: 2
HOW MANY DAYS DID YOU NOT DO HOUSEWORK BECAUSE OF HEADACHES: 3
HOW MANY DAYS WAS YOUR PRODUCTIVITY CUT IN HALF BECAUSE OF HEADACHES: 3
HOW MANY DAYS IN THE PAST 3 MONTHS HAVE YOU HAD A HEADACHE: 6

## 2023-05-24 ENCOUNTER — VIRTUAL VISIT (OUTPATIENT)
Dept: NEUROLOGY | Facility: CLINIC | Age: 53
End: 2023-05-24
Payer: COMMERCIAL

## 2023-05-24 DIAGNOSIS — G43.709 CHRONIC MIGRAINE WITHOUT AURA WITHOUT STATUS MIGRAINOSUS, NOT INTRACTABLE: ICD-10-CM

## 2023-05-24 DIAGNOSIS — G43.809 VESTIBULAR MIGRAINE: ICD-10-CM

## 2023-05-24 PROCEDURE — 99213 OFFICE O/P EST LOW 20 MIN: CPT | Mod: VID | Performed by: PSYCHIATRY & NEUROLOGY

## 2023-05-24 RX ORDER — RIZATRIPTAN BENZOATE 5 MG/1
5 TABLET ORAL
Qty: 18 TABLET | Refills: 11 | Status: SHIPPED | OUTPATIENT
Start: 2023-05-24 | End: 2024-05-22

## 2023-05-24 RX ORDER — TOPIRAMATE 25 MG/1
75 TABLET, FILM COATED ORAL EVERY EVENING
Qty: 90 TABLET | Refills: 11 | Status: SHIPPED | OUTPATIENT
Start: 2023-05-24 | End: 2024-05-22

## 2023-05-24 NOTE — PROGRESS NOTES
Virtual Visit Details    Type of service:  Video Visit     Originating Location (pt. Location): Home    Distant Location (provider location):  Off-site  Platform used for Video Visit: [facility]    Headache Neurology Progress Note  May 24, 2023    Subjective:    Nilsa Vazquez returns for follow up of chronic headaches with chronic migraine phenotype in the setting of glomus tumor s/p resection.  Topiramate 75 mg nightly has been successful at managing her headaches.    Today, she reports no dizziness, nausea.  These were previously prominent and bothersome symptoms.  She had two severe headaches since our last visit.  This represents a significant decrease in headache.    For treatment, she takes medications and stays home. She used rizatriptan once, which helped.    She continues topiramate 75 mg nightly, without side effects.     Objective:   Vitals: There were no vitals taken for this visit.  General: Cooperative, NAD  Neurologic:  Mental Status: Fully alert, attentive and oriented. Speech clear and fluent.   Cranial Nerves: Facial movements decreased on the left.      Assessment/Plan:   Nilsa Vazquez is a 52 year old woman who returns for follow-up of severe posterior headaches associated with photophobia, photophobia, in the setting of left glomus tumor status post resection years ago, with some residual tumor seen on imaging.  She also had 8 months of episodic vertigo, also associated with photophobia, photophobia, as well as nausea.  Symptoms are well managed with topiramate 75 mg nightly.      Going forward, she will continue symptomatic treatment for chronic migraine and possible vestibular migraine.   -For acute treatment of mild headache, I recommend Excedrin as needed, not to exceed more than 9 days/month to avoid medication overuse.  - For acute treatment of headache not responsive to Excedrin, I recommend a trial of rizatriptan 5 mg taken at the onset of  headache, with a repeat dose in 2 hours if needed.  This should not exceed more than 9 days/month to avoid medication overuse.  I recommend she check her blood pressure at home before taking this medication, and only proceed if her blood pressure is below 160/100.  Potential side effects were discussed.     Her symptom frequency and severity warrant prevention.   -I recommend she continue topiramate 75 mg nightly.  -We discussed the possibility of tapering topiramate in the future, if headaches remain well controlled.  She would not choose to taper today, and I provided refills for another year.  -Alternatives in the future could include antihypertensive such as beta-blockers or calcium channel blockers if okay with her primary care physician in combination with her other antihypertensives, amitriptyline, botulinum toxin injections, or a CGRP monoclonal antibody.    I plan to see back in 1 year, worsened.    Carol Ann Lai MD  Neurology

## 2023-05-24 NOTE — LETTER
5/24/2023       RE: Nilsa Vazquez  [address] [id number]     Dear Colleague,    Thank you for referring your patient, Nilsa Vazquez, to the [facility] [place] [facility]. Please see a copy of my visit note below.      [facility]    Headache Neurology Progress Note  May 24, 2023    Subjective:    Nilsa Vazquez returns for follow up of chronic headaches with chronic migraine phenotype in the setting of glomus tumor s/p resection.  Topiramate 75 mg nightly has been successful at managing her headaches.    Today, she reports no dizziness, nausea.  These were previously prominent and bothersome symptoms.  She had two severe headaches since our last visit.  This represents a significant decrease in headache.    For treatment, she takes medications and stays home. She used rizatriptan once, which helped.    She continues topiramate 75 mg nightly, without side effects.     Objective:   Vitals: There were no vitals taken for this visit.  General: Cooperative, NAD  Neurologic:  Mental Status: Fully alert, attentive and oriented. Speech clear and fluent.   Cranial Nerves: Facial movements decreased on the left.      Assessment/Plan:   Nilsa Vazquez is a 52 year old woman who returns for follow-up of severe posterior headaches associated with photophobia, photophobia, in the setting of left glomus tumor status post resection years ago, with some residual tumor seen on imaging.  She also had 8 months of episodic vertigo, also associated with photophobia, photophobia, as well as nausea.  Symptoms are well managed with topiramate 75 mg nightly.      Going forward, she will continue symptomatic treatment for chronic migraine and possible vestibular migraine.   -For acute treatment of mild headache, I recommend Excedrin as needed, not to exceed more than 9 days/month to avoid medication  overuse.  - For acute treatment of headache not responsive to Excedrin, I recommend a trial of rizatriptan 5 mg taken at the onset of headache, with a repeat dose in 2 hours if needed.  This should not exceed more than 9 days/month to avoid medication overuse.  I recommend she check her blood pressure at home before taking this medication, and only proceed if her blood pressure is below 160/100.  Potential side effects were discussed.     Her symptom frequency and severity warrant prevention.   -I recommend she continue topiramate 75 mg nightly.  -We discussed the possibility of tapering topiramate in the future, if headaches remain well controlled.  She would not choose to taper today, and I provided refills for another year.  -Alternatives in the future could include antihypertensive such as beta-blockers or calcium channel blockers if okay with her primary care physician in combination with her other antihypertensives, amitriptyline, botulinum toxin injections, or a CGRP monoclonal antibody.    I plan to see back in 1 year, worsened.        Again, thank you for allowing me to participate in the care of your patient.      Sincerely,    Carol Ann Lai MD

## 2023-05-24 NOTE — NURSING NOTE
Is the patient currently in the state of [place]? YES    Visit mode:VIDEO    If the visit is dropped, the patient can be reconnected by: TELEPHONE VISIT: Phone number: [phone]    Will anyone else be joining the visit? NO      How would you like to obtain your AVS? MyChart    Are changes needed to the allergy or medication list? NO    Reason for visit: Video Visit (Follow-up )

## 2023-05-26 ENCOUNTER — TELEPHONE (OUTPATIENT)
Dept: NEUROLOGY | Facility: CLINIC | Age: 53
End: 2023-05-26
Payer: COMMERCIAL

## 2023-05-26 NOTE — TELEPHONE ENCOUNTER
Left Voicemail (1st Attempt) and Left Voicemail (2nd Attempt) for the patient to call back and schedule the following:    Appointment type: follow up  Provider:   Return date: 1 year 4/24/2024  Specialty phone number: [phone]  Additional appointment(s) needed: N/A  Additonal Notes: LVM, sent MyC   Dorie Wilder on 5/26/2023 at 2:12 PM

## 2023-07-25 DIAGNOSIS — G47.00 PERSISTENT DISORDER OF INITIATING OR MAINTAINING SLEEP: ICD-10-CM

## 2023-07-26 RX ORDER — ZOLPIDEM TARTRATE 5 MG/1
5 TABLET ORAL
Qty: 30 TABLET | Refills: 1 | Status: SHIPPED | OUTPATIENT
Start: 2023-07-26 | End: 2023-09-01

## 2023-07-26 NOTE — TELEPHONE ENCOUNTER
Routing to ordering provider for consideration, not on refill protocol.           Vicky Rivera     RN MSN

## 2023-08-31 ASSESSMENT — ENCOUNTER SYMPTOMS
HEMATOCHEZIA: 0
HEADACHES: 1
JOINT SWELLING: 0
MYALGIAS: 0
BREAST MASS: 0
FREQUENCY: 0
EYE PAIN: 0
DIZZINESS: 0
ARTHRALGIAS: 0
CHILLS: 0
SHORTNESS OF BREATH: 0
DIARRHEA: 0
DYSURIA: 0
FEVER: 0
NAUSEA: 0
CONSTIPATION: 0
NERVOUS/ANXIOUS: 0
PARESTHESIAS: 1
HEMATURIA: 0
COUGH: 0
ABDOMINAL PAIN: 0
WEAKNESS: 0
PALPITATIONS: 0
HEARTBURN: 1

## 2023-09-01 ENCOUNTER — OFFICE VISIT (OUTPATIENT)
Dept: FAMILY MEDICINE | Facility: CLINIC | Age: 53
End: 2023-09-01
Payer: COMMERCIAL

## 2023-09-01 VITALS
WEIGHT: 246 LBS | OXYGEN SATURATION: 98 % | TEMPERATURE: 98.3 F | HEART RATE: 75 BPM | BODY MASS INDEX: 40.98 KG/M2 | SYSTOLIC BLOOD PRESSURE: 120 MMHG | RESPIRATION RATE: 16 BRPM | DIASTOLIC BLOOD PRESSURE: 78 MMHG | HEIGHT: 65 IN

## 2023-09-01 DIAGNOSIS — Z12.4 CERVICAL CANCER SCREENING: ICD-10-CM

## 2023-09-01 DIAGNOSIS — G47.00 PERSISTENT DISORDER OF INITIATING OR MAINTAINING SLEEP: ICD-10-CM

## 2023-09-01 DIAGNOSIS — I10 ESSENTIAL HYPERTENSION, BENIGN: ICD-10-CM

## 2023-09-01 DIAGNOSIS — D44.7 GLOMUS JUGULARE TUMOR (H): ICD-10-CM

## 2023-09-01 DIAGNOSIS — G62.9 PERIPHERAL POLYNEUROPATHY: ICD-10-CM

## 2023-09-01 DIAGNOSIS — E66.01 MORBID OBESITY (H): ICD-10-CM

## 2023-09-01 DIAGNOSIS — Z12.31 VISIT FOR SCREENING MAMMOGRAM: ICD-10-CM

## 2023-09-01 DIAGNOSIS — Z00.00 ROUTINE GENERAL MEDICAL EXAMINATION AT A HEALTH CARE FACILITY: Primary | ICD-10-CM

## 2023-09-01 LAB
ALBUMIN SERPL BCG-MCNC: 4.1 G/DL (ref 3.5–5.2)
ALP SERPL-CCNC: 127 U/L (ref 35–104)
ALT SERPL W P-5'-P-CCNC: 25 U/L (ref 0–50)
ANION GAP SERPL CALCULATED.3IONS-SCNC: 12 MMOL/L (ref 7–15)
AST SERPL W P-5'-P-CCNC: 22 U/L (ref 0–45)
BILIRUB SERPL-MCNC: 0.4 MG/DL
BUN SERPL-MCNC: 16.3 MG/DL (ref 6–20)
CALCIUM SERPL-MCNC: 9.8 MG/DL (ref 8.6–10)
CHLORIDE SERPL-SCNC: 105 MMOL/L (ref 98–107)
CHOLEST SERPL-MCNC: 172 MG/DL
CREAT SERPL-MCNC: 0.94 MG/DL (ref 0.51–0.95)
DEPRECATED CALCIDIOL+CALCIFEROL SERPL-MC: 47 UG/L (ref 20–75)
DEPRECATED HCO3 PLAS-SCNC: 20 MMOL/L (ref 22–29)
ERYTHROCYTE [DISTWIDTH] IN BLOOD BY AUTOMATED COUNT: 12.6 % (ref 10–15)
FERRITIN SERPL-MCNC: 124 NG/ML (ref 11–328)
GFR SERPL CREATININE-BSD FRML MDRD: 72 ML/MIN/1.73M2
GLUCOSE SERPL-MCNC: 101 MG/DL (ref 70–99)
HBA1C MFR BLD: 5.3 % (ref 0–5.6)
HCT VFR BLD AUTO: 42 % (ref 35–47)
HDLC SERPL-MCNC: 38 MG/DL
HGB BLD-MCNC: 14.4 G/DL (ref 11.7–15.7)
LDLC SERPL CALC-MCNC: 110 MG/DL
MCH RBC QN AUTO: 29.3 PG (ref 26.5–33)
MCHC RBC AUTO-ENTMCNC: 34.3 G/DL (ref 31.5–36.5)
MCV RBC AUTO: 86 FL (ref 78–100)
NONHDLC SERPL-MCNC: 134 MG/DL
PLATELET # BLD AUTO: 320 10E3/UL (ref 150–450)
POTASSIUM SERPL-SCNC: 4.2 MMOL/L (ref 3.4–5.3)
PROT SERPL-MCNC: 7.2 G/DL (ref 6.4–8.3)
RBC # BLD AUTO: 4.91 10E6/UL (ref 3.8–5.2)
SODIUM SERPL-SCNC: 137 MMOL/L (ref 136–145)
TRIGL SERPL-MCNC: 120 MG/DL
TSH SERPL DL<=0.005 MIU/L-ACNC: 2.51 UIU/ML (ref 0.3–4.2)
VIT B12 SERPL-MCNC: 695 PG/ML (ref 232–1245)
WBC # BLD AUTO: 11 10E3/UL (ref 4–11)

## 2023-09-01 PROCEDURE — 84443 ASSAY THYROID STIM HORMONE: CPT | Performed by: FAMILY MEDICINE

## 2023-09-01 PROCEDURE — 85027 COMPLETE CBC AUTOMATED: CPT | Performed by: FAMILY MEDICINE

## 2023-09-01 PROCEDURE — 99396 PREV VISIT EST AGE 40-64: CPT | Performed by: FAMILY MEDICINE

## 2023-09-01 PROCEDURE — 80061 LIPID PANEL: CPT | Performed by: FAMILY MEDICINE

## 2023-09-01 PROCEDURE — 82728 ASSAY OF FERRITIN: CPT | Performed by: FAMILY MEDICINE

## 2023-09-01 PROCEDURE — G0145 SCR C/V CYTO,THINLAYER,RESCR: HCPCS | Performed by: FAMILY MEDICINE

## 2023-09-01 PROCEDURE — 82306 VITAMIN D 25 HYDROXY: CPT | Performed by: FAMILY MEDICINE

## 2023-09-01 PROCEDURE — 82607 VITAMIN B-12: CPT | Performed by: FAMILY MEDICINE

## 2023-09-01 PROCEDURE — 87624 HPV HI-RISK TYP POOLED RSLT: CPT | Performed by: FAMILY MEDICINE

## 2023-09-01 PROCEDURE — 36415 COLL VENOUS BLD VENIPUNCTURE: CPT | Performed by: FAMILY MEDICINE

## 2023-09-01 PROCEDURE — 80053 COMPREHEN METABOLIC PANEL: CPT | Performed by: FAMILY MEDICINE

## 2023-09-01 PROCEDURE — 99214 OFFICE O/P EST MOD 30 MIN: CPT | Mod: 25 | Performed by: FAMILY MEDICINE

## 2023-09-01 PROCEDURE — 83036 HEMOGLOBIN GLYCOSYLATED A1C: CPT | Performed by: FAMILY MEDICINE

## 2023-09-01 RX ORDER — LOSARTAN POTASSIUM 50 MG/1
100 TABLET ORAL DAILY
Qty: 180 TABLET | Refills: 3 | Status: SHIPPED | OUTPATIENT
Start: 2023-09-01 | End: 2024-09-03

## 2023-09-01 RX ORDER — ZOLPIDEM TARTRATE 5 MG/1
5 TABLET ORAL
Qty: 30 TABLET | Refills: 5 | Status: SHIPPED | OUTPATIENT
Start: 2023-09-01 | End: 2024-02-27

## 2023-09-01 ASSESSMENT — ENCOUNTER SYMPTOMS
FEVER: 0
HEARTBURN: 1
PALPITATIONS: 0
MYALGIAS: 0
SHORTNESS OF BREATH: 0
HEMATOCHEZIA: 0
ABDOMINAL PAIN: 0
HEMATURIA: 0
NERVOUS/ANXIOUS: 0
ARTHRALGIAS: 0
CONSTIPATION: 0
NAUSEA: 0
BREAST MASS: 0
WEAKNESS: 0
DIZZINESS: 0
PARESTHESIAS: 1
HEADACHES: 1
JOINT SWELLING: 0
EYE PAIN: 0
COUGH: 0
CHILLS: 0
DIARRHEA: 0
DYSURIA: 0
FREQUENCY: 0

## 2023-09-01 ASSESSMENT — PAIN SCALES - GENERAL: PAINLEVEL: NO PAIN (0)

## 2023-09-01 NOTE — NURSING NOTE
"Chief Complaint   Patient presents with    Physical     /78   Pulse 75   Temp 98.3  F (36.8  C) (Tympanic)   Resp 16   Ht 1.657 m (5' 5.25\")   Wt 111.6 kg (246 lb)   LMP 08/22/2023   SpO2 98%   BMI 40.62 kg/m   Estimated body mass index is 40.62 kg/m  as calculated from the following:    Height as of this encounter: 1.657 m (5' 5.25\").    Weight as of this encounter: 111.6 kg (246 lb).  Patient presents to the clinic using No DME      Health Maintenance that is potentially due pending provider review:    Health Maintenance Due   Topic Date Due    HEPATITIS B IMMUNIZATION (1 of 3 - 3-dose series) Never done    HIV SCREENING  Never done    HEPATITIS C SCREENING  Never done    COVID-19 Vaccine (3 - Pfizer series) 01/12/2022    MAMMO SCREENING  07/10/2022    YEARLY PREVENTIVE VISIT  07/27/2023    ANNUAL REVIEW OF HM ORDERS  07/27/2023    PAP FOLLOW-UP  07/27/2023    HPV FOLLOW-UP  07/27/2023    INFLUENZA VACCINE (1) 09/01/2023        n/a          "

## 2023-09-01 NOTE — PROGRESS NOTES
SUBJECTIVE:   CC: Nayeli is an 52 year old who presents for preventive health visit.       2023     7:42 AM   Additional Questions   Roomed by Iwona TENORIO   Accompanied by Self         2023     7:42 AM   Patient Reported Additional Medications   Patient reports taking the following new medications .       Healthy Habits:     Getting at least 3 servings of Calcium per day:  NO    Bi-annual eye exam:  NO    Dental care twice a year:  NO    Sleep apnea or symptoms of sleep apnea:  Daytime drowsiness    Diet:  Regular (no restrictions)    Frequency of exercise:  None    Taking medications regularly:  Yes    Medication side effects:  None    Additional concerns today:  Yes    Burning sensation and tingling toes both feet   The last 2 months   No new meds no change in diet   Pt wondering about possible neuropathy in bilateral feet  Numbness, cold sensitivity, hot sensitivity  Shooting pain  Continuous     Fingernails seem to be growing differently  Curving down seems weaker     Hypertension Follow-up    Do you check your blood pressure regularly outside of the clinic? No   Are you following a low salt diet? No  Are your blood pressures ever more than 140 on the top number (systolic) OR more   than 90 on the bottom number (diastolic), for example 140/90?       Social History     Tobacco Use    Smoking status: Former     Packs/day: 1.00     Years: 25.00     Pack years: 25.00     Types: Cigarettes     Quit date: 2005     Years since quittin.0    Smokeless tobacco: Never   Substance Use Topics    Alcohol use: Yes     Comment: occasionally             2023     6:46 AM   Alcohol Use   Prescreen: >3 drinks/day or >7 drinks/week? No     Reviewed orders with patient.  Reviewed health maintenance and updated orders accordingly - Yes  Labs reviewed in EPIC    Breast Cancer Screening:    FHS-7:       2021     3:56 PM 2022    11:05 AM 10/1/2022     8:11 PM 2023     6:47 AM   Breast CA Risk Assessment  (FHS-7)   Did any of your first-degree relatives have breast or ovarian cancer? No No No No   Did any of your relatives have bilateral breast cancer? No No No No   Did any man in your family have breast cancer? No No No No   Did any woman in your family have breast and ovarian cancer? No No No No   Did any woman in your family have breast cancer before age 50 y? No No No No   Do you have 2 or more relatives with breast and/or ovarian cancer? No No No No   Do you have 2 or more relatives with breast and/or bowel cancer? No No No No       Mammogram Screening: Recommended annual mammography  Pertinent mammograms are reviewed under the imaging tab.    History of abnormal Pap smear: follow up based on results       Latest Ref Rng & Units 7/27/2022     7:00 AM 6/28/2019     4:40 PM 6/28/2019     4:35 PM   PAP / HPV   PAP  Atypical squamous cells of undetermined significance (ASC-US)      PAP (Historical)   ASC-US     HPV 16 DNA Negative Negative   Negative    HPV 18 DNA Negative Negative   Negative    Other HR HPV Negative Negative   Negative      Reviewed and updated as needed this visit by clinical staff   Tobacco  Allergies  Meds  Problems  Med Hx  Surg Hx  Fam Hx          Reviewed and updated as needed this visit by Provider   Tobacco  Allergies  Meds  Problems  Med Hx  Surg Hx  Fam Hx             Review of Systems   Constitutional:  Negative for chills and fever.   HENT:  Negative for congestion, ear pain and hearing loss.    Eyes:  Negative for pain and visual disturbance.   Respiratory:  Negative for cough and shortness of breath.    Cardiovascular:  Negative for chest pain, palpitations and peripheral edema.   Gastrointestinal:  Positive for heartburn. Negative for abdominal pain, constipation, diarrhea, hematochezia and nausea.   Breasts:  Positive for tenderness. Negative for breast mass and discharge.   Genitourinary:  Positive for vaginal bleeding. Negative for dysuria, frequency, genital sores,  "hematuria, pelvic pain, urgency and vaginal discharge.   Musculoskeletal:  Negative for arthralgias, joint swelling and myalgias.   Skin:  Negative for rash.   Neurological:  Positive for headaches and paresthesias. Negative for dizziness and weakness.   Psychiatric/Behavioral:  Positive for mood changes. The patient is not nervous/anxious.           OBJECTIVE:   /78   Pulse 75   Temp 98.3  F (36.8  C) (Tympanic)   Resp 16   Ht 1.657 m (5' 5.25\")   Wt 111.6 kg (246 lb)   LMP 08/22/2023   SpO2 98%   BMI 40.62 kg/m    Physical Exam  GENERAL APPEARANCE: healthy, alert and no distress  EYES: Eyes grossly normal to inspection, PERRL and conjunctivae and sclerae normal  HENT: ear canals and TM's normal, nose and mouth without ulcers or lesions, oropharynx clear and oral mucous membranes moist  NECK: no adenopathy, no asymmetry, masses, or scars and thyroid normal to palpation  RESP: lungs clear to auscultation - no rales, rhonchi or wheezes  BREAST: normal without masses, tenderness or nipple discharge and no palpable axillary masses or adenopathy  CV: regular rate and rhythm, normal S1 S2, no S3 or S4, no murmur, click or rub, no peripheral edema and peripheral pulses strong  ABDOMEN: soft, nontender, no hepatosplenomegaly, no masses and bowel sounds normal   (female): normal female external genitalia, normal urethral meatus, vaginal mucosal atrophy noted, normal cervix, adnexae, and uterus without masses or abnormal discharge  MS: no musculoskeletal defects are noted and gait is age appropriate without ataxia  SKIN: no suspicious lesions or rashes  NEURO: Normal strength and tone, sensory exam grossly normal, mentation intact and speech normal  PSYCH: mentation appears normal and affect normal/bright    Diagnostic Test Results:  Labs reviewed in Epic    ASSESSMENT/PLAN:   (Z00.00) Routine general medical examination at a health care facility  (primary encounter diagnosis)  Comment:   Plan:     (I10) " "Essential hypertension, benign  Comment: Stable no change in treatment plan.   Plan: losartan (COZAAR) 50 MG tablet, CBC with         platelets, Comprehensive metabolic panel, Lipid        panel reflex to direct LDL Fasting, TSH with         free T4 reflex            (G47.00) Persistent disorder of initiating or maintaining sleep  Comment: Stable no change in treatment plan.   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)  Comment: following with ENT no change   Plan:     (Z12.4) Cervical cancer screening  Comment:   Plan: Pap Screen with HPV - recommended age 30 - 65         years            (Z12.31) Visit for screening mammogram  Comment:   Plan:     (G62.9) Peripheral polyneuropathy  Comment: new diagnosis   Not a clear etiol will start work up   Plan: Hemoglobin A1c, Vitamin B12, Vitamin D         Deficiency, Ferritin            Patient has been advised of split billing requirements and indicates understanding: Yes      COUNSELING:  Reviewed preventive health counseling, as reflected in patient instructions      BMI:   Estimated body mass index is 40.62 kg/m  as calculated from the following:    Height as of this encounter: 1.657 m (5' 5.25\").    Weight as of this encounter: 111.6 kg (246 lb).   Weight management plan: Patient referred to endocrine and/or weight management specialty      She reports that she quit smoking about 18 years ago. Her smoking use included cigarettes. She has a 25.00 pack-year smoking history. She has never used smokeless tobacco.          Claudia Mitchell MD  [facility]  "

## 2023-09-06 LAB
BKR LAB AP GYN ADEQUACY: NORMAL
BKR LAB AP GYN INTERPRETATION: NORMAL
BKR LAB AP HPV REFLEX: NORMAL
BKR LAB AP LMP: NORMAL
BKR LAB AP PREVIOUS ABNL DX: NORMAL
BKR LAB AP PREVIOUS ABNORMAL: NORMAL
PATH REPORT.COMMENTS IMP SPEC: NORMAL
PATH REPORT.COMMENTS IMP SPEC: NORMAL
PATH REPORT.RELEVANT HX SPEC: NORMAL

## 2023-09-08 ENCOUNTER — PATIENT OUTREACH (OUTPATIENT)
Dept: FAMILY MEDICINE | Facility: CLINIC | Age: 53
End: 2023-09-08
Payer: COMMERCIAL

## 2023-09-28 DIAGNOSIS — R20.9 ALTERATIONS OF SENSATIONS: Primary | ICD-10-CM

## 2023-10-02 NOTE — TELEPHONE ENCOUNTER
Action Corine Roman on 10/2/2023 at 10:02 AM   Action Taken Attempted to call Pt to ask if seen at any other facilities for current issue. No answer, left VM. -FEDERICO Robleroe Jessie on 10/2/2023 at 1:42 PM Pt returned call; Stated that they have not been seen elsewhere for current Neurological concerns. -FEDERICO     RECORDS RECEIVED FROM: Internal    REASON FOR VISIT: Alterations of sensations   Date of Appt: 10/19/23 11:00 am    NOTES (FOR ALL VISITS) STATUS DETAILS   OFFICE NOTE from referring provider Internal 9/28/23 (Orders Only), 5/24/23, 8/24/22 Carol Ann Lai MD @[facility]    See Additional Encounters   OFFICE NOTE from other specialist Internal 9/1/23 Claudia Mitchell MD @[facility]    11/2/21 Renetta Cedeno MD @[facility]ENT     MEDICATION LIST Internal    IMAGING  (FOR ALL VISITS)     MRI (HEAD, NECK, SPINE) Internal [facility]  10/13/21 MRA Neck (Carotids)  10/13/21  MR Brain

## 2023-10-11 ENCOUNTER — ANCILLARY PROCEDURE (OUTPATIENT)
Dept: MAMMOGRAPHY | Facility: CLINIC | Age: 53
End: 2023-10-11
Payer: COMMERCIAL

## 2023-10-11 DIAGNOSIS — Z12.31 VISIT FOR SCREENING MAMMOGRAM: ICD-10-CM

## 2023-10-11 PROCEDURE — 77067 SCR MAMMO BI INCL CAD: CPT | Mod: TC | Performed by: RADIOLOGY

## 2023-10-11 PROCEDURE — 77063 BREAST TOMOSYNTHESIS BI: CPT | Mod: TC | Performed by: RADIOLOGY

## 2023-10-19 ENCOUNTER — VIRTUAL VISIT (OUTPATIENT)
Dept: NEUROLOGY | Facility: CLINIC | Age: 53
End: 2023-10-19
Payer: COMMERCIAL

## 2023-10-19 ENCOUNTER — PRE VISIT (OUTPATIENT)
Dept: NEUROLOGY | Facility: CLINIC | Age: 53
End: 2023-10-19

## 2023-10-19 DIAGNOSIS — R20.9 ALTERATIONS OF SENSATIONS: ICD-10-CM

## 2023-10-19 PROCEDURE — 99215 OFFICE O/P EST HI 40 MIN: CPT | Mod: VID | Performed by: PSYCHIATRY & NEUROLOGY

## 2023-10-19 RX ORDER — GABAPENTIN 300 MG/1
600 CAPSULE ORAL AT BEDTIME
Qty: 60 CAPSULE | Refills: 1 | Status: SHIPPED | OUTPATIENT
Start: 2023-10-31 | End: 2023-12-26

## 2023-10-19 RX ORDER — GABAPENTIN 100 MG/1
CAPSULE ORAL
Qty: 24 CAPSULE | Refills: 0 | Status: SHIPPED | OUTPATIENT
Start: 2023-10-19 | End: 2024-01-24 | Stop reason: DRUGHIGH

## 2023-10-19 NOTE — PROGRESS NOTES
[facility] Neurology Consultation/Follow up    Nilsa Vazquez MRN# [id number]   Age: 52 year old YOB: 1970     Requesting physician: Claudia Mcneal     Reason for Consultation: altered sensations/neuropathy      History of Presenting Symptoms:   Nilsa Vazquez is a 52 year old female who presents today for evaluation of altered sensations/neuropathy.    The patient is well followed within our headache/migraine clinic with Dr. Lai for management of her vestibular migraines.  The patient mentioned in a 9/27/2023 [employer] message that she was having concerns for neuropathy in her feet.  A referral to general neurology was made.  Upon chart review, the patient was seen with her PCP 9/1/1023 where it is noted she had a new diagnosis of peripheral polyneuropathy.  Serum studies dated 9/1/2023 were unrevealing for an etiology (Ferritin, Vitamin D, B12, TSH, lipids, A1c, CMP, CBC), although her LDL was elevated (110).  For her migraines, the patient was on Topamax 75 mg at bedtime.    I did see this patient in the past, although it was for a separate issue beyond peripheral neuropathy.  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 The evaluation occurred 9/8/2021 and at that time her reflexes were intact, her sensory testing was normal, and there was no report of sensory issues of the legs.      Today, the patient feels that in the last 4-5 months she has had itching and burning in her feet (mostly toes, both sides).  The symptoms are worse when sitting at night, but can be present through the day.  There is no discoloration of her toes when symptoms are present. The symptoms don't improve with rubbing or movement. She feels that when she touches her toes, they are blunted (like your body feels when it is in water for to long), and this symptom is constant.  It hasn't spread towards her feet.  There is  some degree of sharp pain in the great toes, but this fluctuates or comes on in waves or spurts. There are no falls or imbalance from this. She has no weakness with her feet at this time. There is no ongoing urinary incontinence or fecal incontinence.      There is no change in diet.  She takes vitamins often/daily. Carbonated beverages are flat at this point while on Topamax.  She started Topamax in 2021.       Medications:     Current Outpatient Medications   Medication    diphenhydrAMINE (BENADRYL) 25 MG capsule    losartan (COZAAR) 50 MG tablet    multivitamin, therapeutic (THERA-VIT) TABS tablet    prochlorperazine (COMPAZINE) 10 MG tablet    rizatriptan (MAXALT) 5 MG tablet    topiramate (TOPAMAX) 25 MG tablet    zolpidem (AMBIEN) 5 MG tablet      Physical Exam:   General: Seated comfortably in no acute distress.  Neurologic:     Mental Status: Fully alert, attentive and oriented. Speech clear and fluent, no paraphasic errors.     Cranial Nerves: EOMI with normal smooth pursuit. Facial movements symmetric. Hearing not formally tested but intact to conversation.  No dysarthria.     Motor: No tremors or other abnormal movements observed.      Sensory:Negative Romberg.            Data: Pertinent prior to visit   Imaging:  None pertinent    Laboratory:  As above         Assessment and Plan:   Assessment:  Polyneuropathy suspected, likely distal symmetric with small fiber involvement    The patient's b/l toe numbness with dysesthesias that don't respond to physical manipulation or walking and are occurring through the day are more akin to a neuropathy than to conditions like RLS, gout, vasculitis, physical trauma.  Overall, further investigation to look for reversible causes would take an EMG and serum studies. While awaiting testing, she could trial gabapentin for symptom relief.      Plan:  EMG b/l lower  Gabapentin titrate up to 600 mg at bedtime  SPEP, Immunofixation, B6, B1, Vitamin E, Vitamin D, ESR,  CRP    Follow up in Neurology clinic in 3 months, or should new concerns arise.    HELGA Sears D.O.   of Neurology    Total time today (55 min) in this patient encounter was spent on pre-charting, counseling and/or coordination of care.  The patient is in agreement with this plan and has no further questions.

## 2023-10-19 NOTE — PROGRESS NOTES
Virtual Visit Details    Type of service:  Video Visit     Originating Location (pt. Location): Home    Distant Location (provider location):  On-site  Platform used for Video Visit: Brooklynn

## 2023-10-19 NOTE — NURSING NOTE
Is the patient currently in the state of [place]? YES    Visit mode:VIDEO    If the visit is dropped, the patient can be reconnected by: TELEPHONE VISIT: Phone number:   Telephone Information:   Mobile [phone]       Will anyone else be joining the visit? NO  (If patient encounters technical issues they should call [phone]    How would you like to obtain your AVS? MyChart    Are changes needed to the allergy or medication list? Pt stated no med changes    Reason for visit: Video Visit (New patient )    Coby GONZALEZ

## 2023-10-19 NOTE — LETTER
10/19/2023       RE: Nilsa Vazquez  [address] [facility] [address]     Dear Colleague,    Thank you for referring your patient, Nilsa Vazquez, to the [facility] [place] at [facility]. Please see a copy of my visit note below.    [facility] Neurology Consultation/Follow up    Nilsa Vazquez MRN# [id number]   Age: 52 year old YOB: 1970     Requesting physician: Claudia Mcneal     Reason for Consultation: altered sensations/neuropathy      History of Presenting Symptoms:   Nilsa Vazquez is a 52 year old female who presents today for evaluation of altered sensations/neuropathy.    The patient is well followed within our headache/migraine clinic with Dr. Lai for management of her vestibular migraines.  The patient mentioned in a 9/27/2023 [employer] message that she was having concerns for neuropathy in her feet.  A referral to general neurology was made.  Upon chart review, the patient was seen with her PCP 9/1/1023 where it is noted she had a new diagnosis of peripheral polyneuropathy.  Serum studies dated 9/1/2023 were unrevealing for an etiology (Ferritin, Vitamin D, B12, TSH, lipids, A1c, CMP, CBC), although her LDL was elevated (110).  For her migraines, the patient was on Topamax 75 mg at bedtime.    I did see this patient in the past, although it was for a separate issue beyond peripheral neuropathy.  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 The evaluation occurred 9/8/2021 and at that time her reflexes were intact, her sensory testing was normal, and there was no report of sensory issues of the legs.      Today, the patient feels that in the last 4-5 months she has had itching and burning in her feet (mostly toes, both sides).  The symptoms are worse when sitting at night, but can be present through  the day.  There is no discoloration of her toes when symptoms are present. The symptoms don't improve with rubbing or movement. She feels that when she touches her toes, they are blunted (like your body feels when it is in water for to long), and this symptom is constant.  It hasn't spread towards her feet.  There is some degree of sharp pain in the great toes, but this fluctuates or comes on in waves or spurts. There are no falls or imbalance from this. She has no weakness with her feet at this time. There is no ongoing urinary incontinence or fecal incontinence.      There is no change in diet.  She takes vitamins often/daily. Carbonated beverages are flat at this point while on Topamax.  She started Topamax in 2021.       Medications:     Current Outpatient Medications   Medication    diphenhydrAMINE (BENADRYL) 25 MG capsule    losartan (COZAAR) 50 MG tablet    multivitamin, therapeutic (THERA-VIT) TABS tablet    prochlorperazine (COMPAZINE) 10 MG tablet    rizatriptan (MAXALT) 5 MG tablet    topiramate (TOPAMAX) 25 MG tablet    zolpidem (AMBIEN) 5 MG tablet      Physical Exam:   General: Seated comfortably in no acute distress.  Neurologic:     Mental Status: Fully alert, attentive and oriented. Speech clear and fluent, no paraphasic errors.     Cranial Nerves: EOMI with normal smooth pursuit. Facial movements symmetric. Hearing not formally tested but intact to conversation.  No dysarthria.     Motor: No tremors or other abnormal movements observed.      Sensory:Negative Romberg.            Data: Pertinent prior to visit   Imaging:  None pertinent    Laboratory:  As above         Assessment and Plan:   Assessment:  Polyneuropathy suspected, likely distal symmetric with small fiber involvement    The patient's b/l toe numbness with dysesthesias that don't respond to physical manipulation or walking and are occurring through the day are more akin to a neuropathy than to conditions like RLS, gout, vasculitis,  physical trauma.  Overall, further investigation to look for reversible causes would take an EMG and serum studies. While awaiting testing, she could trial gabapentin for symptom relief.      Plan:  EMG b/l lower  Gabapentin titrate up to 600 mg at bedtime  SPEP, Immunofixation, B6, B1, Vitamin E, Vitamin D, ESR, CRP    Follow up in Neurology clinic in 3 months, or should new concerns arise.    Total time today (55 min) in this patient encounter was spent on pre-charting, counseling and/or coordination of care.  The patient is in agreement with this plan and has no further questions.          Again, thank you for allowing me to participate in the care of your patient.      Sincerely,    Severo Sears, DO

## 2023-10-19 NOTE — PATIENT INSTRUCTIONS
I think your symptoms do sound like a neuropathy is present and that further investigation into a reason for it is needed.  While looking for a cause, I also think you could try a treatment for the burning.    - Gabapentin 100 mg at bedtime for 4 days, then   - 200 mg at bedtime for 4 days, then   - 300 mg at bedtime for 4 days, then   - change pills to 600 mg tablets/capsules and take 600 mg (2 pills) at bedtime   - After taking the medication for this dose for 1-2 weeks, please contact me through [employer] to update me on your response or lack there-of.

## 2023-10-22 ENCOUNTER — LAB (OUTPATIENT)
Dept: LAB | Facility: CLINIC | Age: 53
End: 2023-10-22
Payer: COMMERCIAL

## 2023-10-22 DIAGNOSIS — R20.9 ALTERATIONS OF SENSATIONS: ICD-10-CM

## 2023-10-22 LAB
CRP SERPL-MCNC: 20.94 MG/L
ERYTHROCYTE [SEDIMENTATION RATE] IN BLOOD BY WESTERGREN METHOD: 15 MM/HR (ref 0–30)
TOTAL PROTEIN SERUM FOR ELP: 6.9 G/DL (ref 6.4–8.3)
VIT D+METAB SERPL-MCNC: 35 NG/ML (ref 20–50)

## 2023-10-22 PROCEDURE — 86140 C-REACTIVE PROTEIN: CPT

## 2023-10-22 PROCEDURE — 84425 ASSAY OF VITAMIN B-1: CPT | Mod: 90

## 2023-10-22 PROCEDURE — 84207 ASSAY OF VITAMIN B-6: CPT | Mod: 90

## 2023-10-22 PROCEDURE — 99000 SPECIMEN HANDLING OFFICE-LAB: CPT

## 2023-10-22 PROCEDURE — 85652 RBC SED RATE AUTOMATED: CPT

## 2023-10-22 PROCEDURE — 84155 ASSAY OF PROTEIN SERUM: CPT

## 2023-10-22 PROCEDURE — 84165 PROTEIN E-PHORESIS SERUM: CPT | Performed by: PATHOLOGY

## 2023-10-22 PROCEDURE — 82306 VITAMIN D 25 HYDROXY: CPT

## 2023-10-22 PROCEDURE — 86334 IMMUNOFIX E-PHORESIS SERUM: CPT | Performed by: PATHOLOGY

## 2023-10-22 PROCEDURE — 84446 ASSAY OF VITAMIN E: CPT | Mod: 90

## 2023-10-22 PROCEDURE — 36415 COLL VENOUS BLD VENIPUNCTURE: CPT

## 2023-10-23 DIAGNOSIS — R20.9 ALTERATIONS OF SENSATIONS: Primary | ICD-10-CM

## 2023-10-23 NOTE — PROGRESS NOTES
Rheumatology referral given elevated CRP and ESR.    HELGA Sears D.O.  [facility] Neurology

## 2023-10-24 LAB
A-TOCOPHEROL VIT E SERPL-MCNC: 8.3 MG/L
ALBUMIN SERPL ELPH-MCNC: 3.9 G/DL (ref 3.7–5.1)
ALPHA1 GLOB SERPL ELPH-MCNC: 0.3 G/DL (ref 0.2–0.4)
ALPHA2 GLOB SERPL ELPH-MCNC: 0.7 G/DL (ref 0.5–0.9)
B-GLOBULIN SERPL ELPH-MCNC: 0.9 G/DL (ref 0.6–1)
BETA+GAMMA TOCOPHEROL SERPL-MCNC: 1.9 MG/L
GAMMA GLOB SERPL ELPH-MCNC: 1 G/DL (ref 0.7–1.6)
M PROTEIN SERPL ELPH-MCNC: 0.1 G/DL
PROT PATTERN SERPL ELPH-IMP: ABNORMAL
PROT PATTERN SERPL IFE-IMP: NORMAL
PYRIDOXAL PHOS SERPL-SCNC: 13.4 NMOL/L

## 2023-10-25 LAB — VIT B1 PYROPHOSHATE BLD-SCNC: 243 NMOL/L

## 2023-10-27 DIAGNOSIS — R20.9 ALTERATIONS OF SENSATIONS: Primary | ICD-10-CM

## 2023-10-27 RX ORDER — LANOLIN ALCOHOL/MO/W.PET/CERES
50 CREAM (GRAM) TOPICAL DAILY
Qty: 30 TABLET | Refills: 5 | Status: SHIPPED | OUTPATIENT
Start: 2023-10-27 | End: 2024-09-03

## 2023-11-01 DIAGNOSIS — R20.9 ALTERATIONS OF SENSATIONS: Primary | ICD-10-CM

## 2023-11-01 NOTE — PROGRESS NOTES
Elevated monoclonal austen when testing for neuropathy causes. Hematology referral made.    HELGA Sears D.O.  [facility] Neurology

## 2023-11-02 ENCOUNTER — PATIENT OUTREACH (OUTPATIENT)
Dept: ONCOLOGY | Facility: CLINIC | Age: 53
End: 2023-11-02
Payer: COMMERCIAL

## 2023-11-02 NOTE — PROGRESS NOTES
[facility]: Hematology                                                                Hem/Onc  Referral reviewed  Adult Oncology/Hematology  Referral [[id number]]  Ordering user: Severo Sears DO 11/01/23 1309     Referral Details  Referred By  Referred To   Severo Sears DO  [facility] [address] [address]   Phone: [phone]   Fax: [phone]    Diagnoses: Alterations of sensations   Order: Adult Oncology/Hematology  Referral    Hematology     Order Questions  Question Answer Comment   My Clinical Question Is: elevated monoclonal antibody markers noted when looking for causes of neuropathy.    Reason for Referral: Hematology    Hematology type: Unknown MGUS or MM possibility        ASSESSMENT      Clinical History (per Nurse review of records provided):    52 year old female patient referred by neurology as above  mspike 0.1   no diff on cbc, 2 mos ago, No anemia   no hypercalcemia, no k/l light chains  Elevaed CRP  Small monoclonal IgG on elp    Residence: [facility] [address]  PCP: Claudia Mitchell  Records Location: [facility]   Pertinent labs -- BOOKMARKED  Referring provider note(s)-- BOOKMARKED    INTERVENTION(S)                                                      -Referral Triage Scheduling Instructions added to Hem/Onc  referral for Patient Access rep ( number below, hours are Monday - Friday 8am - 4:30 pm) who will contact pt in the next 1-2 business days to schedule the consultation.    PLAN                                                      Hematology consult    Records Needed:     See records team's Pre-Visit encounter documentation for additional records retrieval information.    Stephanie Wall, RN, BSN, OCN  Hematology/Oncology New Patient Nurse Navigator   [facility]  [phone]

## 2023-11-21 ENCOUNTER — ONCOLOGY VISIT (OUTPATIENT)
Dept: ONCOLOGY | Facility: CLINIC | Age: 53
End: 2023-11-21
Attending: PSYCHIATRY & NEUROLOGY
Payer: COMMERCIAL

## 2023-11-21 ENCOUNTER — LAB (OUTPATIENT)
Dept: LAB | Facility: CLINIC | Age: 53
End: 2023-11-21
Payer: COMMERCIAL

## 2023-11-21 ENCOUNTER — PRE VISIT (OUTPATIENT)
Dept: ONCOLOGY | Facility: CLINIC | Age: 53
End: 2023-11-21
Payer: COMMERCIAL

## 2023-11-21 VITALS
WEIGHT: 254 LBS | RESPIRATION RATE: 20 BRPM | HEIGHT: 65 IN | HEART RATE: 78 BPM | TEMPERATURE: 99 F | SYSTOLIC BLOOD PRESSURE: 168 MMHG | BODY MASS INDEX: 42.32 KG/M2 | OXYGEN SATURATION: 96 % | DIASTOLIC BLOOD PRESSURE: 85 MMHG

## 2023-11-21 DIAGNOSIS — D47.2 MGUS (MONOCLONAL GAMMOPATHY OF UNKNOWN SIGNIFICANCE): Primary | ICD-10-CM

## 2023-11-21 DIAGNOSIS — R20.9 ALTERATIONS OF SENSATIONS: ICD-10-CM

## 2023-11-21 DIAGNOSIS — D47.2 MGUS (MONOCLONAL GAMMOPATHY OF UNKNOWN SIGNIFICANCE): ICD-10-CM

## 2023-11-21 PROCEDURE — 83521 IG LIGHT CHAINS FREE EACH: CPT

## 2023-11-21 PROCEDURE — 99213 OFFICE O/P EST LOW 20 MIN: CPT | Performed by: INTERNAL MEDICINE

## 2023-11-21 PROCEDURE — 36415 COLL VENOUS BLD VENIPUNCTURE: CPT

## 2023-11-21 PROCEDURE — 99203 OFFICE O/P NEW LOW 30 MIN: CPT | Performed by: INTERNAL MEDICINE

## 2023-11-21 ASSESSMENT — PAIN SCALES - GENERAL: PAINLEVEL: SEVERE PAIN (6)

## 2023-11-21 NOTE — PROGRESS NOTES
[facility] Hematology and Oncology Consult Note    Patient: Nilsa Vazquez  MRN: [id number]  Date of Service: 11/21/2023      Reason for Visit    Chief Complaint   Patient presents with    Hematology     MGUS - New consult         Assessment/Plan    Problem List Items Addressed This Visit    None  Visit Diagnoses       MGUS (monoclonal gammopathy of unknown significance)    -  Primary    Relevant Orders    Kappa and lambda light chain    Alterations of sensations               MGUS  IgG kappa  Monoclonal peak of 0.1 g/dL  I reviewed labs in detail.  SPEP was ordered in the setting of neuropathy.    Monoclonal peak is pretty small and 0.1 g/dL.  No clinical features of myeloma.  We will get light chains today.  Reviewed the diagnosis of MGUS with her in detail today.  We also reviewed its natural course and its potential transforming into multiple myeloma.  Her risk is pretty low.  Probably about 1 %/year.  I do not think there is any causal relationship between her monoclonal protein and neuropathy.  Explained to her that some IgM monoclonal proteins can be associated with peripheral neuropathy.  Another association could be POEMS, however and this almost exclusively we see lambda light chain type monoclonal protein.  This is a.  So I do not think we are dealing with this year.  For all practical purposes I would this as MGUS.  Due to the small nature of the monoclonal peak I think once a year check would be sufficient.  Recommend checking SPEP, kappa and lambda light chains, serum immunofixation, calcium and creatinine and CBC once a year.  If her monoclonal protein continues to rise then please refer her back.  She wants to continue her follow-up with her PCP.  So no follow-ups in hematology will be scheduled.    ECOG Performance    0 - Independent    Problem List    Patient Active Problem List   Diagnosis    GLOSSOPHAR NERVE DYSFUNCTION    Essential hypertension, benign    Persistent disorder of  initiating or maintaining sleep    CARDIOVASCULAR SCREENING; LDL GOAL LESS THAN 160    Glomus jugulare tumor (H)    Morbid obesity (H)    ASCUS of cervix with negative high risk HPV     ______________________________________________________________________________    Staging History     Cancer Staging   No matching staging information was found for the patient.        History of presenting illness:  Nilsa Vazquez is a 53-year-old female with neuropathy issues has been referred by neurology for further evaluation management of monoclonal gammopathy of unknown significance.    Recently was seen by neurology for peripheral neuropathy symptoms including burning sensation and numbness mainly involving toes.  Normal B12, TSH and other serum chemistries.  SPEP was ordered which showed small monoclonal peak of 0.1 g/dL.  On immunofixation was an IgG kappa monoclonal protein.  Kappa lambda light chains are within normal limits.  She has a history of paraganglioma of the left jugular foramen.  Diagnosed in 2007.  Status post resection.  Has not had any recurrence since then although MRI done in October 2021 showed mild increase in the size of the residual tumor.    No fever chills or night sweats.  No bone pain.  No pathologic fractures.  Calcium levels are within normal limits.  Former smoker quit in 2015.      Past History    Past Medical History:   Diagnosis Date    Abnormal Pap smear of cervix 2019    see problem list    Hypertension     Paraganglioma (H) 2007    Family History   Problem Relation Age of Onset    Diabetes Mother         type 2    Hypertension Mother     Heart Failure Mother 75    Diabetes Father         type 2    Hypertension Father     Genitourinary Problems Father         kidney stones    Cancer Father 75        bladder cancer    Prostate Cancer Father     Breast Cancer Maternal Grandmother     Diabetes Maternal Grandmother     Hypertension Maternal Grandmother     Cerebrovascular Disease Maternal  Grandmother     Hypertension Brother     Blood Disease Brother         blood clot in leg    Colon Cancer Maternal Grandfather       Past Surgical History:   Procedure Laterality Date    COLONOSCOPY      craniotomy N/A     CYSTOSCOPY, SLING TRANSVAGINAL N/A 10/17/2022    Procedure: TRANSVAGINAL SLING, WITH CYSTOSCOPY;  Surgeon: Homar Clark MD;  Location: [facility] OR    ENT SURGERY  2007    HEAD & NECK SURGERY  2007    ORTHOPEDIC SURGERY      SURGICAL HISTORY OF -    approx    Bilateral feet repair, with pins in place    VASCULAR SURGERY      Social History     Socioeconomic History    Marital status:      Spouse name: Saurabh    Number of children: 2    Years of education: Not on file    Highest education level: Not on file   Occupational History    Occupation: accounts receivable   Tobacco Use    Smoking status: Former     Packs/day: 1.00     Years: 25.00     Additional pack years: 0.00     Total pack years: 25.00     Types: Cigarettes     Quit date: 2005     Years since quittin.3    Smokeless tobacco: Never   Vaping Use    Vaping Use: Never used   Substance and Sexual Activity    Alcohol use: Yes     Comment: occasionally    Drug use: No    Sexual activity: Yes     Partners: Male     Birth control/protection: Male Surgical   Other Topics Concern    Parent/sibling w/ CABG, MI or angioplasty before 65F 55M? No   Social History Narrative    Not on file     Social Determinants of Health     Financial Resource Strain: Not on file   Food Insecurity: Not on file   Transportation Needs: Not on file   Physical Activity: Not on file   Stress: Not on file   Social Connections: Not on file   Interpersonal Safety: Not on file   Housing Stability: Not on file        Allergies    No Known Allergies    Review of Systems    Pertinent items are noted in HPI.      Physical Exam        2023     2:56 PM   Oncology Vitals   Height 166 cm   Weight 115.214 kg   BSA (m2) 2.3 m2   /85   Pulse  78   Temp 99  F (37.2  C)   Temp src Tympanic   SpO2 96 %   Pain Score 6 (Severe)       General: alert and cooperative  HEENT: Head: Normal, normocephalic, atraumatic.  Eye: Normal external eye, conjunctiva, lids cornea, [place].  Extremities: atraumatic, no peripheral edema  CNS: Alert and oriented x3, left facial palsy noted.        Lab Results    No results found for this or any previous visit (from the past 168 hour(s)).    Imaging Results    No results found.    A total of 30 minutes was spent today on this visit including face to face conversation with the patient, EMR review (labs, imaging studies, pathology reports and outside records), counseling and care co-ordination and documentation.    Signed by: Chai Marquez MD

## 2023-11-21 NOTE — LETTER
11/21/2023         RE: Nilsa Vazquez  [address] [place] [id number]        Dear Colleague,    Thank you for referring your patient, Nilsa Vazquez, to the [facility] [place]. Please see a copy of my visit note below.    [facility] Hematology and Oncology Consult Note    Patient: Nilsa Vazquez  MRN: [id number]  Date of Service: 11/21/2023      Reason for Visit    Chief Complaint   Patient presents with     Hematology     MGUS - New consult         Assessment/Plan    Problem List Items Addressed This Visit    None  Visit Diagnoses       MGUS (monoclonal gammopathy of unknown significance)    -  Primary    Relevant Orders    Kappa and lambda light chain    Alterations of sensations               MGUS  IgG kappa  Monoclonal peak of 0.1 g/dL  I reviewed labs in detail.  SPEP was ordered in the setting of neuropathy.    Monoclonal peak is pretty small and 0.1 g/dL.  No clinical features of myeloma.  We will get light chains today.  Reviewed the diagnosis of MGUS with her in detail today.  We also reviewed its natural course and its potential transforming into multiple myeloma.  Her risk is pretty low.  Probably about 1 %/year.  I do not think there is any causal relationship between her monoclonal protein and neuropathy.  Explained to her that some IgM monoclonal proteins can be associated with peripheral neuropathy.  Another association could be POEMS, however and this almost exclusively we see lambda light chain type monoclonal protein.  This is a.  So I do not think we are dealing with this year.  For all practical purposes I would this as MGUS.  Due to the small nature of the monoclonal peak I think once a year check would be sufficient.  Recommend checking SPEP, kappa and lambda light chains, serum immunofixation, calcium and creatinine and CBC once a year.  If her monoclonal protein continues to rise then please refer her back.  She wants to continue her  follow-up with her PCP.  So no follow-ups in hematology will be scheduled.    ECOG Performance    0 - Independent    Problem List    Patient Active Problem List   Diagnosis     GLOSSOPHAR NERVE DYSFUNCTION     Essential hypertension, benign     Persistent disorder of initiating or maintaining sleep     CARDIOVASCULAR SCREENING; LDL GOAL LESS THAN 160     Glomus jugulare tumor (H)     Morbid obesity (H)     ASCUS of cervix with negative high risk HPV     ______________________________________________________________________________    Staging History     Cancer Staging   No matching staging information was found for the patient.        History of presenting illness:  Nilsa Vazquez is a 53-year-old female with neuropathy issues has been referred by neurology for further evaluation management of monoclonal gammopathy of unknown significance.    Recently was seen by neurology for peripheral neuropathy symptoms including burning sensation and numbness mainly involving toes.  Normal B12, TSH and other serum chemistries.  SPEP was ordered which showed small monoclonal peak of 0.1 g/dL.  On immunofixation was an IgG kappa monoclonal protein.  Kappa lambda light chains are within normal limits.  She has a history of paraganglioma of the left jugular foramen.  Diagnosed in 2007.  Status post resection.  Has not had any recurrence since then although MRI done in October 2021 showed mild increase in the size of the residual tumor.    No fever chills or night sweats.  No bone pain.  No pathologic fractures.  Calcium levels are within normal limits.  Former smoker quit in 2015.      Past History    Past Medical History:   Diagnosis Date     Abnormal Pap smear of cervix 2019    see problem list     Hypertension      Paraganglioma (H) 2007    Family History   Problem Relation Age of Onset     Diabetes Mother         type 2     Hypertension Mother      Heart Failure Mother 75     Diabetes Father         type 2     Hypertension  Father      Genitourinary Problems Father         kidney stones     Cancer Father 75        bladder cancer     Prostate Cancer Father      Breast Cancer Maternal Grandmother      Diabetes Maternal Grandmother      Hypertension Maternal Grandmother      Cerebrovascular Disease Maternal Grandmother      Hypertension Brother      Blood Disease Brother         blood clot in leg     Colon Cancer Maternal Grandfather       Past Surgical History:   Procedure Laterality Date     COLONOSCOPY       craniotomy N/A      CYSTOSCOPY, SLING TRANSVAGINAL N/A 10/17/2022    Procedure: TRANSVAGINAL SLING, WITH CYSTOSCOPY;  Surgeon: Homar Clark MD;  Location: [facility] OR     ENT SURGERY  2007     HEAD & NECK SURGERY  2007     ORTHOPEDIC SURGERY       SURGICAL HISTORY OF -    approx    Bilateral feet repair, with pins in place     VASCULAR SURGERY      Social History     Socioeconomic History     Marital status:      Spouse name: Saurabh     Number of children: 2     Years of education: Not on file     Highest education level: Not on file   Occupational History     Occupation: accounts receivable   Tobacco Use     Smoking status: Former     Packs/day: 1.00     Years: 25.00     Additional pack years: 0.00     Total pack years: 25.00     Types: Cigarettes     Quit date: 2005     Years since quittin.3     Smokeless tobacco: Never   Vaping Use     Vaping Use: Never used   Substance and Sexual Activity     Alcohol use: Yes     Comment: occasionally     Drug use: No     Sexual activity: Yes     Partners: Male     Birth control/protection: Male Surgical   Other Topics Concern     Parent/sibling w/ CABG, MI or angioplasty before 65F 55M? No   Social History Narrative     Not on file     Social Determinants of Health     Financial Resource Strain: Not on file   Food Insecurity: Not on file   Transportation Needs: Not on file   Physical Activity: Not on file   Stress: Not on file   Social Connections: Not on  "file   Interpersonal Safety: Not on file   Housing Stability: Not on file        Allergies    No Known Allergies    Review of Systems    Pertinent items are noted in HPI.      Physical Exam        11/21/2023     2:56 PM   Oncology Vitals   Height 166 cm   Weight 115.214 kg   BSA (m2) 2.3 m2   /85   Pulse 78   Temp 99  F (37.2  C)   Temp src Tympanic   SpO2 96 %   Pain Score 6 (Severe)       General: alert and cooperative  HEENT: Head: Normal, normocephalic, atraumatic.  Eye: Normal external eye, conjunctiva, lids cornea, [place].  Extremities: atraumatic, no peripheral edema  CNS: Alert and oriented x3, left facial palsy noted.        Lab Results    No results found for this or any previous visit (from the past 168 hour(s)).    Imaging Results    No results found.    A total of 30 minutes was spent today on this visit including face to face conversation with the patient, EMR review (labs, imaging studies, pathology reports and outside records), counseling and care co-ordination and documentation.    Signed by: Chai Marquez MD      Oncology Rooming Note    November 21, 2023 3:00 PM   Nilsa Vazquez is a 53 year old female who presents for:    Chief Complaint   Patient presents with     Hematology     MGUS - New consult     Initial Vitals: BP (!) 168/85 (BP Location: Right arm, Patient Position: Sitting, Cuff Size: Adult Large)   Pulse 78   Temp 99  F (37.2  C) (Tympanic)   Resp 20   Ht 1.655 m (5' 5.16\")   Wt 115.2 kg (254 lb)   SpO2 96%   BMI 42.06 kg/m   Estimated body mass index is 42.06 kg/m  as calculated from the following:    Height as of this encounter: 1.655 m (5' 5.16\").    Weight as of this encounter: 115.2 kg (254 lb). Body surface area is 2.3 meters squared.  Severe Pain (6) Comment: Data Unavailable   No LMP recorded. (Menstrual status: Irregular Periods).  Allergies reviewed: Yes  Medications reviewed: Yes    Medications: Medication refills not needed today.  Pharmacy name entered " into [facility]: [facility], [place] - [address] [place] [address]    Clinical concerns:  New consult      Purnima Waddell [facility]                Again, thank you for allowing me to participate in the care of your patient.        Sincerely,        Chai Marquez MD

## 2023-11-21 NOTE — PROGRESS NOTES
"Oncology Rooming Note    November 21, 2023 3:00 PM   Nilsa Vazquez is a 53 year old female who presents for:    Chief Complaint   Patient presents with    Hematology     MGUS - New consult     Initial Vitals: BP (!) 168/85 (BP Location: Right arm, Patient Position: Sitting, Cuff Size: Adult Large)   Pulse 78   Temp 99  F (37.2  C) (Tympanic)   Resp 20   Ht 1.655 m (5' 5.16\")   Wt 115.2 kg (254 lb)   SpO2 96%   BMI 42.06 kg/m   Estimated body mass index is 42.06 kg/m  as calculated from the following:    Height as of this encounter: 1.655 m (5' 5.16\").    Weight as of this encounter: 115.2 kg (254 lb). Body surface area is 2.3 meters squared.  Severe Pain (6) Comment: Data Unavailable   No LMP recorded. (Menstrual status: Irregular Periods).  Allergies reviewed: Yes  Medications reviewed: Yes    Medications: Medication refills not needed today.  Pharmacy name entered into [facility]: [facility] [place], [place] - [address] [place] [address]    Clinical concerns:  New consult      Purnima Waddell CMA              "

## 2023-11-22 LAB
KAPPA LC FREE SER-MCNC: 2.58 MG/DL (ref 0.33–1.94)
KAPPA LC FREE/LAMBDA FREE SER NEPH: 0.98 {RATIO} (ref 0.26–1.65)
LAMBDA LC FREE SERPL-MCNC: 2.63 MG/DL (ref 0.57–2.63)

## 2023-11-30 ENCOUNTER — HOSPITAL ENCOUNTER (EMERGENCY)
Facility: CLINIC | Age: 53
Discharge: HOME | End: 2023-11-30
Attending: FAMILY MEDICINE | Admitting: FAMILY MEDICINE
Payer: COMMERCIAL

## 2023-11-30 VITALS
SYSTOLIC BLOOD PRESSURE: 191 MMHG | HEIGHT: 66 IN | WEIGHT: 250 LBS | BODY MASS INDEX: 40.18 KG/M2 | OXYGEN SATURATION: 97 % | TEMPERATURE: 98.1 F | RESPIRATION RATE: 18 BRPM | DIASTOLIC BLOOD PRESSURE: 98 MMHG | HEART RATE: 88 BPM

## 2023-11-30 DIAGNOSIS — S86.811A STRAIN OF CALF MUSCLE, RIGHT, INITIAL ENCOUNTER: ICD-10-CM

## 2023-11-30 PROCEDURE — 250N000011 HC RX IP 250 OP 636: Performed by: FAMILY MEDICINE

## 2023-11-30 PROCEDURE — 96372 THER/PROPH/DIAG INJ SC/IM: CPT | Performed by: FAMILY MEDICINE

## 2023-11-30 PROCEDURE — 99283 EMERGENCY DEPT VISIT LOW MDM: CPT | Performed by: FAMILY MEDICINE

## 2023-11-30 PROCEDURE — 99284 EMERGENCY DEPT VISIT MOD MDM: CPT | Performed by: FAMILY MEDICINE

## 2023-11-30 RX ORDER — KETOROLAC TROMETHAMINE 30 MG/ML
30 INJECTION, SOLUTION INTRAMUSCULAR; INTRAVENOUS ONCE
Status: COMPLETED | OUTPATIENT
Start: 2023-11-30 | End: 2023-11-30

## 2023-11-30 RX ADMIN — KETOROLAC TROMETHAMINE 30 MG: 30 INJECTION INTRAMUSCULAR; INTRAVENOUS at 23:46

## 2023-11-30 ASSESSMENT — ACTIVITIES OF DAILY LIVING (ADL): ADLS_ACUITY_SCORE: 35

## 2023-12-01 NOTE — DISCHARGE INSTRUCTIONS
RETURN TO THE EMERGENCY ROOM FOR THE FOLLOWING:    Severely worsened pain, new loss of strength/function, or at anytime for any concern.    FOLLOW UP:    With sports medicine only if not improved over the next 2 weeks.    TREATMENT RECOMMENDATIONS:    Ibuprofen 600 mg every six hours for pain (7 days duration).  Tylenol 1000 mg every six hours for pain (7 days duration).  Therefore, you can alternate these every three hours and do it safely.    NURSE ADVICE LINE:  [phone] or [phone]

## 2023-12-01 NOTE — ED PROVIDER NOTES
"  HPI   Patient is a 53-year-old female presenting with an injury to her right calf.  She denies prior history involving the right lower extremity, specifically no surgery or major trauma.  The patient was walking down her staircase today when she stepped down on the last step and felt/heard a large \"pop.\"  She had immediate pain in her medial calf.  She has had swelling and muscle spasms locally.  No Achilles pain or tenderness.  No other injuries.      Allergies:  No Known Allergies  Problem List:    Patient Active Problem List    Diagnosis Date Noted    ASCUS of cervix with negative high risk HPV 06/28/2019     Priority: Medium     2008 NIL Pap  2014 NIL Pap, Neg HPV  6/28/19 ASCUS Pap, Neg HPV. Plan cotest in 3 years.   7/27/22 ASCUS, neg HPV. Plan: cotest in 1 year   9/1/23 NIL pap, neg HPV. Plan: cotest in 1 year      Morbid obesity (H) 04/24/2018     Priority: Medium    Glomus jugulare tumor (H) 04/07/2017     Priority: Medium    CARDIOVASCULAR SCREENING; LDL GOAL LESS THAN 160 10/31/2010     Priority: Medium    Essential hypertension, benign 08/24/2007     Priority: Medium    Persistent disorder of initiating or maintaining sleep 08/24/2007     Priority: Medium     RX [place] ([place]) reviewed:  reviewed-10/24/18    [place]:  [email or web address]        GLOSSOPHAR NERVE DYSFUNCTION 08/17/2006     Priority: Medium      Past Medical History:    Past Medical History:   Diagnosis Date    Abnormal Pap smear of cervix 2019    Hypertension     Paraganglioma (H) 2007     Past Surgical History:    Past Surgical History:   Procedure Laterality Date    COLONOSCOPY  2022    craniotomy N/A 2007    CYSTOSCOPY, SLING TRANSVAGINAL N/A 10/17/2022    Procedure: TRANSVAGINAL SLING, WITH CYSTOSCOPY;  Surgeon: Homar Clark MD;  Location: [facility] OR    ENT SURGERY  Feb 2007    HEAD & NECK SURGERY  Feb 2007    ORTHOPEDIC SURGERY      SURGICAL HISTORY OF -   1992 approx    Bilateral feet repair, with " "pins in place    VASCULAR SURGERY       Family History:    Family History   Problem Relation Age of Onset    Diabetes Mother         type 2    Hypertension Mother     Heart Failure Mother 75    Diabetes Father         type 2    Hypertension Father     Genitourinary Problems Father         kidney stones    Cancer Father 75        bladder cancer    Prostate Cancer Father     Breast Cancer Maternal Grandmother     Diabetes Maternal Grandmother     Hypertension Maternal Grandmother     Cerebrovascular Disease Maternal Grandmother     Hypertension Brother     Blood Disease Brother         blood clot in leg    Colon Cancer Maternal Grandfather      Social History:  Marital Status:   [2]  Social History     Tobacco Use    Smoking status: Former     Packs/day: 1.00     Years: 25.00     Additional pack years: 0.00     Total pack years: 25.00     Types: Cigarettes     Quit date: 2005     Years since quittin.3    Smokeless tobacco: Never   Vaping Use    Vaping Use: Never used   Substance Use Topics    Alcohol use: Yes     Comment: occasionally    Drug use: No      Medications:    diphenhydrAMINE (BENADRYL) 25 MG capsule  gabapentin (NEURONTIN) 100 MG capsule  gabapentin (NEURONTIN) 300 MG capsule  losartan (COZAAR) 50 MG tablet  multivitamin, therapeutic (THERA-VIT) TABS tablet  prochlorperazine (COMPAZINE) 10 MG tablet  rizatriptan (MAXALT) 5 MG tablet  topiramate (TOPAMAX) 25 MG tablet  vitamin B6 (PYRIDOXINE) 50 MG TABS  zolpidem (AMBIEN) 5 MG tablet      Review of Systems   All other systems reviewed and are negative.      PE   BP: (!) 191/98  Pulse: 88  Temp: 98.1  F (36.7  C)  Resp: 18  Height: 167.6 cm (5' 6\")  Weight: 113.4 kg (250 lb)  SpO2: 97 %  Physical Exam  Vitals reviewed.   Constitutional:       Appearance: She is well-developed.   HENT:      Head: Normocephalic and atraumatic.      Right Ear: External ear normal.      Left Ear: External ear normal.      Nose: Nose normal.      Mouth/Throat:     "  Mouth: Mucous membranes are moist.      Pharynx: Oropharynx is clear.   Eyes:      Extraocular Movements: Extraocular movements intact.      Conjunctiva/sclera: Conjunctivae normal.      Pupils: Pupils are equal, round, and reactive to light.   Cardiovascular:      Rate and Rhythm: Normal rate and regular rhythm.   Pulmonary:      Effort: Pulmonary effort is normal.   Musculoskeletal:         General: Normal range of motion.      Cervical back: Normal range of motion.      Comments: Tender to palpation in the right medial calf.  No excessive swelling.  The calf remains soft.  No overlying skin change.  The Achilles is full and without deficit.  She has normal plantar and dorsiflexion though this causes pain in the calf.   Skin:     General: Skin is warm and dry.   Neurological:      Mental Status: She is alert and oriented to person, place, and time.   Psychiatric:         Behavior: Behavior normal.         ED COURSE and [facility]   2335.  Patient has symptoms and signs as described above.  Gastrocnemius strain.  No Achilles involvement at this point.  Follow-up if not improving.  Toradol IM for pain control.  Crutches requested.    Electronic medical chart reviewed, including medical problems, medications, medical allergies, social history.  Recent hospitalizations and surgical procedures reviewed.  Recent clinic visits and consultations reviewed.  Recent labs and test results reviewed.  Nursing notes reviewed.    The patient, their parent if applicable, and/or their medical decision maker(s) and I have reviewed all of the available historical information, applicable PMH, physical exam findings, and objective diagnostic data gathered during this ED visit.  We then discussed all work-up options and then together agreed upon the course taken during this visit.  The ultimate disposition and plan was a cooperative decision made between myself and the patient, their parent if applicable, and/or their legal decision maker(s).   The risks and benefits of all decisions made during this visit were discussed to the best of my abilities given the circumstances, and all parties are understanding of the pertinent ramifications of these decisions.      LABS  Labs Ordered and Resulted from Time of ED Arrival to Time of ED Departure - No data to display    IMAGING  Images reviewed by me.  Radiology report also reviewed.  No orders to display       Procedures    Medications   ketorolac (TORADOL) injection 30 mg (has no administration in time range)         IMPRESSION       ICD-10-CM    1. Strain of calf muscle, right, initial encounter  S86.811A Crutches Order               Medication List      There are no discharge medications for this visit.                     Tio Clay MD  11/30/23 5442

## 2023-12-01 NOTE — ED TRIAGE NOTES
"Pt presents with concerns of left calf pain. Per pt she was going down stairs at home when she came to landing she felt a \"pop\" in her calf and experienced severe pain. Pt took flexeril and ibuprofen around 1930.      Triage Assessment (Adult)       Row Name 11/30/23 9227          Triage Assessment    Airway WDL WDL        Respiratory WDL    Respiratory WDL WDL        Skin Circulation/Temperature WDL    Skin Circulation/Temperature WDL WDL        Cardiac WDL    Cardiac WDL WDL        Peripheral/Neurovascular WDL    Peripheral Neurovascular WDL WDL     Capillary Refill, General less than/equal to 3 secs        Cognitive/Neuro/Behavioral WDL    Cognitive/Neuro/Behavioral WDL WDL        [place] Coma Scale    Best Eye Response 4-->(E4) spontaneous     Best Motor Response 6-->(M6) obeys commands     Best Verbal Response 5-->(V5) oriented     [place] Coma Scale Score 15                     "

## 2023-12-26 ENCOUNTER — MYC REFILL (OUTPATIENT)
Dept: NEUROLOGY | Facility: CLINIC | Age: 53
End: 2023-12-26
Payer: COMMERCIAL

## 2023-12-26 DIAGNOSIS — R20.9 ALTERATIONS OF SENSATIONS: ICD-10-CM

## 2023-12-27 RX ORDER — GABAPENTIN 300 MG/1
600 CAPSULE ORAL AT BEDTIME
Qty: 60 CAPSULE | Refills: 1 | Status: SHIPPED | OUTPATIENT
Start: 2023-12-27 | End: 2024-02-27

## 2023-12-27 NOTE — TELEPHONE ENCOUNTER
Rx Authorization:  Requested Medication/ Dose: Gabapentin 300MG caps  Date last refill ordered: 10/3/23  Quantity ordered: 60 caps  # refills: 1  Date of last clinic visit with ordering provider: 10/29/23  Date of next clinic visit with ordering provider: F/U 6 months  All pertinent protocol data (lab date/result):   Include pertinent information from patients message:

## 2024-01-21 NOTE — PROGRESS NOTES
Rheumatology Clinic Visit  [facility]  DICK Judd     Nilsa Vazquez MRN# [id number]   YOB: 1970 Age: 53 year old   Date of Visit: 01/24/2024  Primary care provider: Claudia Mitchell          Assessment and Plan:     1.  Peripheral neuropathy  2.  Elevated CRP    Patient presents today for an initial evaluation.  She was initially seen by neurology due to persistent symptoms of peripheral neuropathy.  Causes of the neuropathy are idiopathic.  Upon further evaluation she was found to have an elevated C-reactive protein.  She was referred to rheumatology for further investigation of that elevated C-reactive protein.  No specific joint pain.  She states that the neuropathy in her feet is what bothers her the most.  She has had some changes to her nails and that they are more thin and bending forward as well.  Physical examination today did not show any active synovitis or dactylitis.  She does have decreased sensation on her toes.  She does have nail ridging.  Previous laboratory evaluations and imaging studies were reviewed, results below.    I discussed with the patient that her symptoms are not suggestive of polymyalgia rheumatica.  She did have an elevated C-reactive protein which we will recheck today along with some other autoimmune labs.  We will check an PREMA and I did educate the patient that an PREMA is a nonspecific test and if it does come back positive it is not diagnostic of a disorder but would mean more blood work would need to be performed.  Also get x-rays of her feet.  I will contact the patient with the results once it is complete.    Plan:     Schedule follow-up with Grace Melgoza PA-C as needed  Imaging: xray feet  Labs: PREMA, CRP, Sed Rate, Ccp antibody, Rheumatoid factor    DICK Judd  Rheumatology         History of Present Illness:   Nilsa Vazquez presents for evaluation of elevated CRP.  Past medical history includes peripheral neuropathy,  low B6 level, MGUS    Neuropathy is her biggest issue. She states that tests were run and found elevated CRP. She does not have diabetes. The neuropathy is in her feet. She states that it is painful. She states that most of the time if feels as if she has been in water too long. She gets shooting from her toes out, in the feet. She has sensitivity to shoes, socks and touch. She states that her nails are growing different as well. No skin rashes. She has had some hair loss, she is also perimenopausal. She has some dry eye and dry mouth. This is from her tumor/tumor removal. No unexplained fevers or weight loss. She states that she has fatigue and decreased motivation. No history of blood clots, seizures or miscarriages. In the last year she had 4 episodes where she lost all feeling of her legs. Then the feeling returns. The episodes last about 5-10 minutes. No history of pericarditis or pleuritis. No history of uveitis or iritis. She is scheduled to have a EMG. She does have a history of migraines and takes medications for it.     Brother with a history of antiphospholipid syndrome. MS on her mother's side (aunts and uncles). No RA or lupus. Brother and Aunt with psoriasis. No personal or family history of ulcerative colitis or crohn's.          Review of Systems:     Constitutional: negative  Skin: negative  Eyes: negative  Ears/Nose/Throat: negative  Respiratory: No shortness of breath, dyspnea on exertion, cough, or hemoptysis  Cardiovascular: negative  Gastrointestinal: negative  Genitourinary: negative  Musculoskeletal: as above  Neurologic: as above  Psychiatric: negative  Hematologic/Lymphatic/Immunologic: negative  Endocrine: negative         Active Problem List:     Patient Active Problem List    Diagnosis Date Noted    ASCUS of cervix with negative high risk HPV 06/28/2019     Priority: Medium     2008 NIL Pap  2014 NIL Pap, Neg HPV  6/28/19 ASCUS Pap, Neg HPV. Plan cotest in 3 years.   7/27/22 ASCUS, neg  HPV. Plan: cotest in 1 year   23 NIL pap, neg HPV. Plan: cotest in 1 year      Morbid obesity (H) 2018     Priority: Medium    Glomus jugulare tumor (H) 2017     Priority: Medium    CARDIOVASCULAR SCREENING; LDL GOAL LESS THAN 160 10/31/2010     Priority: Medium    Essential hypertension, benign 2007     Priority: Medium    Persistent disorder of initiating or maintaining sleep 2007     Priority: Medium     RX monitoring [place] ([place]) reviewed:  reviewed-10/24/18    [place] profile:  [email or web address]        GLOSSOPHAR NERVE DYSFUNCTION 2006     Priority: Medium            Past Medical History:     Past Medical History:   Diagnosis Date    Abnormal Pap smear of cervix     see problem list    Hypertension     Paraganglioma (H)      Past Surgical History:   Procedure Laterality Date    COLONOSCOPY      craniotomy N/A     CYSTOSCOPY, SLING TRANSVAGINAL N/A 10/17/2022    Procedure: TRANSVAGINAL SLING, WITH CYSTOSCOPY;  Surgeon: Homar Clark MD;  Location: [facility] OR    ENT SURGERY  2007    HEAD & NECK SURGERY  2007    ORTHOPEDIC SURGERY      SURGICAL HISTORY OF -    approx    Bilateral feet repair, with pins in place    VASCULAR SURGERY              Social History:     Social History     Socioeconomic History    Marital status:      Spouse name: Saurabh    Number of children: 2    Years of education: Not on file    Highest education level: Not on file   Occupational History    Occupation: accounts receivable   Tobacco Use    Smoking status: Former     Packs/day: 1.00     Years: 25.00     Additional pack years: 0.00     Total pack years: 25.00     Types: Cigarettes     Quit date: 2005     Years since quittin.4    Smokeless tobacco: Never   Vaping Use    Vaping Use: Never used   Substance and Sexual Activity    Alcohol use: Yes     Comment: occasionally    Drug use: No    Sexual activity: Yes     Partners: Male     Birth  control/protection: Male Surgical   Other Topics Concern    Parent/sibling w/ CABG, MI or angioplasty before 65F 55M? No   Social History Narrative    Not on file     Social Determinants of Health     Financial Resource Strain: Not on file   Food Insecurity: Not on file   Transportation Needs: Not on file   Physical Activity: Not on file   Stress: Not on file   Social Connections: Not on file   Interpersonal Safety: Not on file   Housing Stability: Not on file          Family History:     Family History   Problem Relation Age of Onset    Diabetes Mother         type 2    Hypertension Mother     Heart Failure Mother 75    Diabetes Father         type 2    Hypertension Father     Genitourinary Problems Father         kidney stones    Cancer Father 75        bladder cancer    Prostate Cancer Father     Breast Cancer Maternal Grandmother     Diabetes Maternal Grandmother     Hypertension Maternal Grandmother     Cerebrovascular Disease Maternal Grandmother     Hypertension Brother     Blood Disease Brother         blood clot in leg    Colon Cancer Maternal Grandfather             Allergies:   No Known Allergies         Medications:     Current Outpatient Medications   Medication Sig Dispense Refill    diphenhydrAMINE (BENADRYL) 25 MG capsule Take 25 mg by mouth at bedtime      gabapentin (NEURONTIN) 300 MG capsule Take 2 capsules (600 mg) by mouth at bedtime 60 capsule 1    losartan (COZAAR) 50 MG tablet Take 2 tablets (100 mg) by mouth daily 180 tablet 3    multivitamin, therapeutic (THERA-VIT) TABS tablet Take 1 tablet by mouth daily Separate vitamins      prochlorperazine (COMPAZINE) 10 MG tablet Take 1 tablet (10 mg) by mouth every 6 hours as needed for nausea or vomiting (migraine) 30 tablet 3    rizatriptan (MAXALT) 5 MG tablet Take 1 tablet (5 mg) by mouth at onset of headache for migraine (repeat in 2 hours if needed) Do not take if bp is >160/100. 18 tablet 11    topiramate (TOPAMAX) 25 MG tablet Take 3 tablets  "(75 mg) by mouth every evening 90 tablet 11    vitamin B6 (PYRIDOXINE) 50 MG TABS Take 1 tablet (50 mg) by mouth daily 30 tablet 5    zolpidem (AMBIEN) 5 MG tablet Take 1 tablet (5 mg) by mouth every evening as needed for sleep 30 tablet 5            Physical Exam:   Blood pressure (!) 164/85, pulse 91, resp. rate 20, height 1.676 m (5' 6\"), weight 114.8 kg (253 lb), SpO2 96%, not currently breastfeeding.  Wt Readings from Last 6 Encounters:   01/24/24 114.8 kg (253 lb)   11/30/23 113.4 kg (250 lb)   11/21/23 115.2 kg (254 lb)   09/01/23 111.6 kg (246 lb)   10/17/22 109.8 kg (242 lb)   10/04/22 109.8 kg (242 lb)     Constitutional: well-developed, appearing stated age; cooperative  Eyes: nl PERRLA, conjunctiva, sclera  ENT: nl external ears, nose, hearing, lips, teeth, gums, throat. No mucositis.   No mucous membrane lesions, normal saliva pool  Neck: no mass or thyroid enlargement  Resp: No shortness of breath with normal conversation  Lymph: no cervical, supraclavicular or epitrochlear nodes  MS: The TMJ, neck, shoulder, elbow, wrist, MCP/PIP/DIP, spine, knee, ankle, and foot MTP/IP joints were examined and found normal. No active synovitis or altered joint anatomy. Full joint ROM. Normal  strength. No dactylitis,  tenosynovitis, enthesopathy.   Neuro: decreased sensation on toes to light tough  Skin: Nail ridging.  Psych: nl judgement, orientation, memory, affect.           Data:   Imaging:  No joint imaging    Laboratory:  9/1/2023  White blood cell count 11.0, hemoglobin 14.4, platelet count 320  Creatinine 0.94, GFR 72  Albumin 4.1  ALT 25, AST 22    10/22/2023  CRP 20.94  Vitamin B1 243  Vitamin B6 13.4  Vitamin D 35  Sed rate 15  Positive for monoclonal peak at 0.1      "

## 2024-01-24 ENCOUNTER — HOSPITAL ENCOUNTER (OUTPATIENT)
Dept: GENERAL RADIOLOGY | Facility: CLINIC | Age: 54
Discharge: HOME | End: 2024-01-24
Attending: PHYSICIAN ASSISTANT | Admitting: PHYSICIAN ASSISTANT
Payer: COMMERCIAL

## 2024-01-24 ENCOUNTER — OFFICE VISIT (OUTPATIENT)
Dept: RHEUMATOLOGY | Facility: CLINIC | Age: 54
End: 2024-01-24
Attending: PSYCHIATRY & NEUROLOGY
Payer: COMMERCIAL

## 2024-01-24 VITALS
DIASTOLIC BLOOD PRESSURE: 85 MMHG | HEIGHT: 66 IN | SYSTOLIC BLOOD PRESSURE: 164 MMHG | HEART RATE: 91 BPM | RESPIRATION RATE: 20 BRPM | OXYGEN SATURATION: 96 % | WEIGHT: 253 LBS | BODY MASS INDEX: 40.66 KG/M2

## 2024-01-24 DIAGNOSIS — R79.82 ELEVATED C-REACTIVE PROTEIN (CRP): ICD-10-CM

## 2024-01-24 DIAGNOSIS — G60.9 IDIOPATHIC PERIPHERAL NEUROPATHY: Primary | ICD-10-CM

## 2024-01-24 DIAGNOSIS — G60.9 IDIOPATHIC PERIPHERAL NEUROPATHY: ICD-10-CM

## 2024-01-24 LAB
CRP SERPL-MCNC: 20.4 MG/L
ERYTHROCYTE [SEDIMENTATION RATE] IN BLOOD BY WESTERGREN METHOD: 15 MM/HR (ref 0–30)
RHEUMATOID FACT SERPL-ACNC: <10 IU/ML

## 2024-01-24 PROCEDURE — 86038 ANTINUCLEAR ANTIBODIES: CPT | Performed by: PHYSICIAN ASSISTANT

## 2024-01-24 PROCEDURE — 85652 RBC SED RATE AUTOMATED: CPT | Performed by: PHYSICIAN ASSISTANT

## 2024-01-24 PROCEDURE — 86431 RHEUMATOID FACTOR QUANT: CPT | Performed by: PHYSICIAN ASSISTANT

## 2024-01-24 PROCEDURE — 86200 CCP ANTIBODY: CPT | Performed by: PHYSICIAN ASSISTANT

## 2024-01-24 PROCEDURE — 86039 ANTINUCLEAR ANTIBODIES (ANA): CPT | Performed by: PHYSICIAN ASSISTANT

## 2024-01-24 PROCEDURE — 73620 X-RAY EXAM OF FOOT: CPT | Mod: 50

## 2024-01-24 PROCEDURE — 99204 OFFICE O/P NEW MOD 45 MIN: CPT | Performed by: PHYSICIAN ASSISTANT

## 2024-01-24 PROCEDURE — 86140 C-REACTIVE PROTEIN: CPT | Performed by: PHYSICIAN ASSISTANT

## 2024-01-24 PROCEDURE — 36415 COLL VENOUS BLD VENIPUNCTURE: CPT | Performed by: PHYSICIAN ASSISTANT

## 2024-01-24 NOTE — PATIENT INSTRUCTIONS
After Visit Instructions:     Thank you for coming to [facility] for your care. It is my goal to partner with you to help you reach your optimal state of health.       Plan:     Schedule follow-up with Grace Melgoza PA-C as needed  Imaging: xray feet  Labs: PREMA, CRP, Sed Rate, Ccp antibody, Rheumatoid factor      Grace Melgoza PA-C  [facility]  [facility]    Contact information: [facility]  Clinic Number:  [phone]  Please call or send a Workday message with any questions about your care

## 2024-01-25 LAB — CCP AB SER IA-ACNC: 2.1 U/ML

## 2024-01-26 LAB
ANA PAT SER IF-IMP: ABNORMAL
ANA SER QL IF: ABNORMAL
ANA TITR SER IF: ABNORMAL {TITER}

## 2024-01-29 ENCOUNTER — TELEPHONE (OUTPATIENT)
Dept: RHEUMATOLOGY | Facility: CLINIC | Age: 54
End: 2024-01-29
Payer: COMMERCIAL

## 2024-01-29 DIAGNOSIS — R76.8 POSITIVE ANA (ANTINUCLEAR ANTIBODY): ICD-10-CM

## 2024-01-29 DIAGNOSIS — G60.9 IDIOPATHIC PERIPHERAL NEUROPATHY: Primary | ICD-10-CM

## 2024-01-31 ENCOUNTER — LAB (OUTPATIENT)
Dept: LAB | Facility: CLINIC | Age: 54
End: 2024-01-31
Payer: COMMERCIAL

## 2024-01-31 DIAGNOSIS — R76.8 POSITIVE ANA (ANTINUCLEAR ANTIBODY): ICD-10-CM

## 2024-01-31 DIAGNOSIS — G60.9 IDIOPATHIC PERIPHERAL NEUROPATHY: ICD-10-CM

## 2024-01-31 PROCEDURE — 81001 URINALYSIS AUTO W/SCOPE: CPT

## 2024-01-31 PROCEDURE — 85613 RUSSELL VIPER VENOM DILUTED: CPT

## 2024-01-31 PROCEDURE — 86146 BETA-2 GLYCOPROTEIN ANTIBODY: CPT

## 2024-01-31 PROCEDURE — 36415 COLL VENOUS BLD VENIPUNCTURE: CPT

## 2024-01-31 PROCEDURE — 85390 FIBRINOLYSINS SCREEN I&R: CPT | Performed by: PATHOLOGY

## 2024-01-31 PROCEDURE — 86147 CARDIOLIPIN ANTIBODY EA IG: CPT

## 2024-01-31 PROCEDURE — 86160 COMPLEMENT ANTIGEN: CPT

## 2024-01-31 PROCEDURE — 86235 NUCLEAR ANTIGEN ANTIBODY: CPT | Mod: 59

## 2024-01-31 PROCEDURE — 86235 NUCLEAR ANTIGEN ANTIBODY: CPT

## 2024-01-31 PROCEDURE — 86225 DNA ANTIBODY NATIVE: CPT

## 2024-01-31 PROCEDURE — 85730 THROMBOPLASTIN TIME PARTIAL: CPT

## 2024-02-01 LAB
C3 SERPL-MCNC: 176 MG/DL (ref 81–157)
C4 SERPL-MCNC: 27 MG/DL (ref 13–39)
DRVVT SCREEN RATIO: 0.83
INR PPP: 0.97 (ref 0.85–1.15)
LA PPP-IMP: NEGATIVE
LUPUS INTERPRETATION: NORMAL
PTT RATIO: 0.97
THROMBIN TIME: 17 SECONDS (ref 13–19)

## 2024-02-02 LAB
B2 GLYCOPROT1 IGG SERPL IA-ACNC: 1.6 U/ML
B2 GLYCOPROT1 IGM SERPL IA-ACNC: <2.4 U/ML
CARDIOLIPIN IGG SER IA-ACNC: <2 GPL-U/ML
CARDIOLIPIN IGG SER IA-ACNC: NEGATIVE
CARDIOLIPIN IGM SER IA-ACNC: <2 MPL-U/ML
CARDIOLIPIN IGM SER IA-ACNC: NEGATIVE
CENTROMERE B AB SER-ACNC: <0.7 U/ML
CENTROMERE B AB SER-ACNC: NEGATIVE
DSDNA AB SER-ACNC: 2.9 IU/ML
ENA SCL70 IGG SER IA-ACNC: <0.6 U/ML
ENA SCL70 IGG SER IA-ACNC: NEGATIVE
ENA SM IGG SER IA-ACNC: 1.2 U/ML
ENA SM IGG SER IA-ACNC: NEGATIVE
ENA SS-A AB SER IA-ACNC: 0.6 U/ML
ENA SS-A AB SER IA-ACNC: NEGATIVE
ENA SS-B IGG SER IA-ACNC: 0.6 U/ML
ENA SS-B IGG SER IA-ACNC: NEGATIVE
U1 SNRNP IGG SER IA-ACNC: 1.8 U/ML
U1 SNRNP IGG SER IA-ACNC: NEGATIVE

## 2024-02-26 DIAGNOSIS — G47.00 PERSISTENT DISORDER OF INITIATING OR MAINTAINING SLEEP: ICD-10-CM

## 2024-02-27 DIAGNOSIS — R20.9 ALTERATIONS OF SENSATIONS: ICD-10-CM

## 2024-02-27 RX ORDER — ZOLPIDEM TARTRATE 5 MG/1
5 TABLET ORAL
Qty: 30 TABLET | Refills: 5 | Status: SHIPPED | OUTPATIENT
Start: 2024-02-27 | End: 2024-08-28

## 2024-02-27 RX ORDER — GABAPENTIN 300 MG/1
600 CAPSULE ORAL AT BEDTIME
Qty: 60 CAPSULE | Refills: 11 | Status: SHIPPED | OUTPATIENT
Start: 2024-02-27 | End: 2024-03-19

## 2024-03-11 ENCOUNTER — OFFICE VISIT (OUTPATIENT)
Dept: NEUROLOGY | Facility: CLINIC | Age: 54
End: 2024-03-11
Attending: PSYCHIATRY & NEUROLOGY
Payer: COMMERCIAL

## 2024-03-11 DIAGNOSIS — R20.9 ALTERATIONS OF SENSATIONS: Primary | ICD-10-CM

## 2024-03-11 PROCEDURE — 95886 MUSC TEST DONE W/N TEST COMP: CPT | Performed by: INTERNAL MEDICINE

## 2024-03-11 PROCEDURE — 95885 MUSC TST DONE W/NERV TST LIM: CPT | Mod: 59 | Performed by: INTERNAL MEDICINE

## 2024-03-11 PROCEDURE — 95910 NRV CNDJ TEST 7-8 STUDIES: CPT | Performed by: INTERNAL MEDICINE

## 2024-03-11 NOTE — LETTER
3/11/2024       RE: Nilsa Vazquez  [address] [facility] [address]     Dear Colleague,    Thank you for referring your patient, Nilsa Vazquez, to the [facility] EMG CLINIC [place] at [facility]. Please see a copy of my visit note below.                            [facility]  Electrodiagnostic Laboratory                 Department of Neurology                                                                                                         Test Date:  3/11/2024    Patient: Nilsa Vazquez : 1970 Physician: Griffin Doe MD   Sex: Female AGE: 53 year Ref Phys:    ID#: [id number]   Technician: Kristy Behling     History and Examination:  Patient is a 54 y/o female who presents for evaluation of numbness in the bilateral feet. She reports some low back pain, but it doesn't radiate down the legs. EMG ordered to evaluate for neuropathy.     Techniques:  Motor conduction studies were done with surface recording electrodes. Sensory conduction studies were performed with surface electrodes, unless indicated otherwise by (n), designating the use of subdermal recording electrodes. Temperature was monitored and recorded throughout the study. Upper extremities were maintained at a temperature of 32 degrees Centigrade or higher.  EMG was done with a concentric needle electrode.     Results:  Evaluation of all motor and sensory nerves (as indicated in the following tables) were within normal limits.     All F Wave latencies were within normal limits.      Needle evaluation of the right Gastrocnemius and the bilateral S1 Paraspinal muscles showed increased insertional activity.  All remaining muscles (as indicated in the following table) showed no evidence of electrical instability.        Interpretation:  This is an abnormal examination. There is electrophysiologic evidence of mildly active likely bilateral (right > left) S1  radiculopathy. There is no evidence of large fiber polyneuropathy in the bilateral lower extremities.     ___________________________  Griffin Doe MD        Nerve Conduction Studies  Motor Sites      Latency Amplitude Neg. Amp Diff Segment Distance Velocity Neg. Dur Neg Area Diff Temperature Comment   Site (ms) Norm (mV) Norm (%)  cm m/s Norm (ms) (%) ( C)    Left Fibular (EDB) Motor   Ankle 3.9  < 6.0 3.7  > 2.5  Ankle-EDB 8   5.2  31.1    Right Fibular (EDB) Motor   Ankle 3.5  < 6.0 3.4  > 2.5  Ankle-EDB 8   6.2  29.7    Bel Fib Head 10.1 - 3.1 - -9 Bel Fib Head-Ankle 35 53  > 38 6.2 -9 29.8    Pop Fossa 12.1 - 3.1 - 0 Pop Fossa-Bel Fib Head 8 40  > 38 5.7 -9 29.8    Left Tibial (AHB) Motor   Ankle 3.0  < 6.5 9.3  > 5.0  Ankle-AHB 8   5.2  30.6    Right Tibial (AHB) Motor   Ankle 3.8  < 6.5 10.5  > 5.0  Ankle-AHB 8   4.7  29.8    Knee 11.6 - 8.5 - -19 Knee-Ankle 38 49  > 38 5.7 4 29.9      Sensory Sites      Onset Lat Peak Lat Amp (O-P) Amp (P-P) Segment Distance Velocity Temperature Comment   Site ms (ms)  V Norm ( V)  cm m/s Norm ( C)    Left Superficial Fibular Sensory   14 cm-Ankle 1.83 2.4 10  > 3 16 14 cm-Ankle 12.5 68  > 38 31    Right Superficial Fibular Sensory   14 cm-Ankle 2.2 3.0 18  > 3 15 14 cm-Ankle 12 55  > 38 28.5    Left Sural Sensory   Calf-Lat Mall 2.2 2.9 9  > 5 13 Calf-Lat Mall 12 55  > 38 30.7    Right Sural Sensory   Calf-Lat Mall 2.2 2.9 11  > 5 11 Calf-Lat Mall 12 55  > 38 30.4      F Wave Studies     Min-F Max-F Dispersion Persistence Mean-F F-Norm L-R Mean-F L-R Mean-F Norm F/M Ratio F-M Lat (ms)   Right Tibial (Abd Hallucis)  29.9  C   43.91 48.13 4.22 100.00 46.34 <61  <5.7 2.45 40.00       Electromyography     Side Muscle Ins Act Fibs/PSW Fasc HF Amp Dur Poly Recrt Int Pat   Right Tib Anterior Nml None Nml 0 Nml Nml 0 Nml Nml   Right Gastroc Incr None Nml 0 Nml Nml 0 Nml Nml   Right Vastus Med Nml None Nml 0 Nml Nml 0 Nml Nml   Left Tib Anterior Nml None Nml 0 Nml Nml 0 Nml Nml    Left Gastroc Nml None Nml 0 Nml Nml 0 Nml Nml   Left Vastus Med Nml None Nml 0 Nml Nml 0 Nml Nml   Right Gluteus Max Nml None Nml 0 Nml Nml 0 Nml Nml   Right S1 Parasp Incr None Nml 0        Left S1 Parasp Incr None Nml 0              NCS Waveforms:    Motor                Sensory                   Again, thank you for allowing me to participate in the care of your patient.      Sincerely,    Griffin Doe MD

## 2024-03-11 NOTE — PROGRESS NOTES
[facility]  Electrodiagnostic Laboratory                 Department of Neurology                                                                                                         Test Date:  3/11/2024    Patient: Nilsa Vazquez : 1970 Physician: Griffin Doe MD   Sex: Female AGE: 53 year Ref Phys:    ID#: [id number]   Technician: Kristy Behling     History and Examination:  Patient is a 54 y/o female who presents for evaluation of numbness in the bilateral feet. She reports some low back pain, but it doesn't radiate down the legs. EMG ordered to evaluate for neuropathy.     Techniques:  Motor conduction studies were done with surface recording electrodes. Sensory conduction studies were performed with surface electrodes, unless indicated otherwise by (n), designating the use of subdermal recording electrodes. Temperature was monitored and recorded throughout the study. Upper extremities were maintained at a temperature of 32 degrees Centigrade or higher.  EMG was done with a concentric needle electrode.     Results:  Evaluation of all motor and sensory nerves (as indicated in the following tables) were within normal limits.     All F Wave latencies were within normal limits.      Needle evaluation of the right Gastrocnemius and the bilateral S1 Paraspinal muscles showed increased insertional activity.  All remaining muscles (as indicated in the following table) showed no evidence of electrical instability.        Interpretation:  This is an abnormal examination. There is electrophysiologic evidence of mildly active likely bilateral (right > left) S1 radiculopathy. There is no evidence of large fiber polyneuropathy in the bilateral lower extremities.     ___________________________  Griffin Doe MD        Nerve Conduction Studies  Motor Sites      Latency Amplitude Neg. Amp Diff Segment Distance Velocity Neg. Dur Neg Area Diff Temperature Comment   Site (ms) Norm  (mV) Norm (%)  cm m/s Norm (ms) (%) ( C)    Left Fibular (EDB) Motor   Ankle 3.9  < 6.0 3.7  > 2.5  Ankle-EDB 8   5.2  31.1    Right Fibular (EDB) Motor   Ankle 3.5  < 6.0 3.4  > 2.5  Ankle-EDB 8   6.2  29.7    Bel Fib Head 10.1 - 3.1 - -9 Bel Fib Head-Ankle 35 53  > 38 6.2 -9 29.8    Pop Fossa 12.1 - 3.1 - 0 Pop Fossa-Bel Fib Head 8 40  > 38 5.7 -9 29.8    Left Tibial (AHB) Motor   Ankle 3.0  < 6.5 9.3  > 5.0  Ankle-AHB 8   5.2  30.6    Right Tibial (AHB) Motor   Ankle 3.8  < 6.5 10.5  > 5.0  Ankle-AHB 8   4.7  29.8    Knee 11.6 - 8.5 - -19 Knee-Ankle 38 49  > 38 5.7 4 29.9      Sensory Sites      Onset Lat Peak Lat Amp (O-P) Amp (P-P) Segment Distance Velocity Temperature Comment   Site ms (ms)  V Norm ( V)  cm m/s Norm ( C)    Left Superficial Fibular Sensory   14 cm-Ankle 1.83 2.4 10  > 3 16 14 cm-Ankle 12.5 68  > 38 31    Right Superficial Fibular Sensory   14 cm-Ankle 2.2 3.0 18  > 3 15 14 cm-Ankle 12 55  > 38 28.5    Left Sural Sensory   Calf-Lat Mall 2.2 2.9 9  > 5 13 Calf-Lat Mall 12 55  > 38 30.7    Right Sural Sensory   Calf-Lat Mall 2.2 2.9 11  > 5 11 Calf-Lat Mall 12 55  > 38 30.4      F Wave Studies     Min-F Max-F Dispersion Persistence Mean-F F-Norm L-R Mean-F L-R Mean-F Norm F/M Ratio F-M Lat (ms)   Right Tibial (Abd Hallucis)  29.9  C   43.91 48.13 4.22 100.00 46.34 <61  <5.7 2.45 40.00       Electromyography     Side Muscle Ins Act Fibs/PSW Fasc HF Amp Dur Poly Recrt Int Pat   Right Tib Anterior Nml None Nml 0 Nml Nml 0 Nml Nml   Right Gastroc Incr None Nml 0 Nml Nml 0 Nml Nml   Right Vastus Med Nml None Nml 0 Nml Nml 0 Nml Nml   Left Tib Anterior Nml None Nml 0 Nml Nml 0 Nml Nml   Left Gastroc Nml None Nml 0 Nml Nml 0 Nml Nml   Left Vastus Med Nml None Nml 0 Nml Nml 0 Nml Nml   Right Gluteus Max Nml None Nml 0 Nml Nml 0 Nml Nml   Right S1 Parasp Incr None Nml 0        Left S1 Parasp Incr None Nml 0              NCS Waveforms:    Motor                Sensory

## 2024-03-19 ENCOUNTER — VIRTUAL VISIT (OUTPATIENT)
Dept: NEUROLOGY | Facility: CLINIC | Age: 54
End: 2024-03-19
Payer: COMMERCIAL

## 2024-03-19 VITALS — WEIGHT: 253 LBS | HEIGHT: 66 IN | BODY MASS INDEX: 40.66 KG/M2

## 2024-03-19 DIAGNOSIS — M54.16 LUMBAR RADICULOPATHY: Primary | ICD-10-CM

## 2024-03-19 DIAGNOSIS — R20.9 ALTERATIONS OF SENSATIONS: ICD-10-CM

## 2024-03-19 PROCEDURE — 99213 OFFICE O/P EST LOW 20 MIN: CPT | Mod: 95 | Performed by: PSYCHIATRY & NEUROLOGY

## 2024-03-19 RX ORDER — GABAPENTIN 600 MG/1
600 TABLET ORAL 3 TIMES DAILY
Qty: 90 TABLET | Refills: 11 | Status: SHIPPED | OUTPATIENT
Start: 2024-03-19 | End: 2024-09-03

## 2024-03-19 RX ORDER — GABAPENTIN 600 MG/1
600 TABLET ORAL 3 TIMES DAILY
Qty: 90 TABLET | Refills: 11 | Status: SHIPPED | OUTPATIENT
Start: 2024-03-19 | End: 2024-03-19

## 2024-03-19 ASSESSMENT — PAIN SCALES - GENERAL: PAINLEVEL: SEVERE PAIN (6)

## 2024-03-19 NOTE — LETTER
3/19/2024       RE: Nilsa Vazquez  [address] [facility] [address]       Dear Colleague,    Thank you for referring your patient, Nilsa Vazquez, to the [facility] NEUROLOGY CLINIC [place] at [facility]. Please see a copy of my visit note below.    [facility] Neurology Follow Up Visit    Nilsa Vazquez MRN# [id number]   Age: 53 year old YOB: 1970     Brief history of symptoms: The patient was initially seen in neurologic consultation on 10/19/2023 for evaluation of altered sensations. Please see the comprehensive neurologic consultation notes from those dates in the Epic records for details.     The patient b/l toe numbness with dysesthesias were thought to have a neuropathy component, or possibly RLS, so an EMG was to be done as well as certain serum studies for reversible neuropathy etiologies as well as treatment for symptoms with gabapentin at bedtime.    Interval history:   - Serum studies 10/22/2023 were revealing for elevated CRP (20.94) but not ESR (15).  A referral to rheumatology was made.  - Serum studies 10/22/2023 were revealing for elevated monoclonal peak.  An oncology referral was made.  - EMG on 3/11/2024 showed b/l (R>L) S1 radiculopathy (active).    Today, the patient symptoms are still the same with reports of pins and needles and shooting pain at times.  The pain and symptoms are mostly in the toes.  There is no new issues of weakness, and no positional component to symptoms with flexion or extension of the lower back.     Physical Exam:   General: Seated comfortably in no acute distress.  HEENT: Neck supple with normal range of motion.   Skin: No rashes  Neurologic:     Mental Status: Fully alert, attentive and oriented. Speech clear and fluent, no paraphasic errors.     Cranial Nerves: EOMI with normal smooth pursuit. Facial movements symmetric. Hearing not formally tested but intact to conversation.  No  dysarthria.     Motor: No tremors or other abnormal movements observed.          Assessment and Plan:   Assessment:  B/l lumbar radiculopathy (S1)    The patient has distal symmetric sensory changes on exam, and her EMG shows a likely contributing factor to those symptoms (b/l S1 radiculopathy).  There is no definitve weakness present, which is reassuring, but I do think imaging is needed to define why the roots are being irritated. We discussed that pending imaging results, a neurosurgery provider may need to get involved versus other more conservative treatments (ANGIE, medical management).  At this time, she may also benefit from PT and increasing her gabapentin (as the low night-time dose does seem to be helping).      Plan:  MRI lumbar spine w/wout contrast  Gabapentin 600 mg BID for 4 days, then   - TID  PT for lumbar radiculopathy (S1, b/l).    Follow up in Neurology clinic in 4-6 weeks, or earlier as needed should new concerns arise.      Total time today (27 min) in this patient encounter was spent on pre-charting, counseling and/or coordination of care.         Again, thank you for allowing me to participate in the care of your patient.      Sincerely,    Severo Sears, DO

## 2024-03-19 NOTE — NURSING NOTE
Is the patient currently in the state of [place]? YES    Visit mode:VIDEO    If the visit is dropped, the patient can be reconnected by: VIDEO VISIT: Text to cell phone:   Telephone Information:   Mobile [phone]       Will anyone else be joining the visit? NO  (If patient encounters technical issues they should call [phone]    How would you like to obtain your AVS? MyChart    Are changes needed to the allergy or medication list? No    Reason for visit: Follow Up    Helena GONZALEZ

## 2024-03-19 NOTE — PROGRESS NOTES
[facility] Neurology Follow Up Visit    Nilsa Vazquez MRN# [id number]   Age: 53 year old YOB: 1970     Brief history of symptoms: The patient was initially seen in neurologic consultation on 10/19/2023 for evaluation of altered sensations. Please see the comprehensive neurologic consultation notes from those dates in the Epic records for details.     The patient b/l toe numbness with dysesthesias were thought to have a neuropathy component, or possibly RLS, so an EMG was to be done as well as certain serum studies for reversible neuropathy etiologies as well as treatment for symptoms with gabapentin at bedtime.    Interval history:   - Serum studies 10/22/2023 were revealing for elevated CRP (20.94) but not ESR (15).  A referral to rheumatology was made.  - Serum studies 10/22/2023 were revealing for elevated monoclonal peak.  An oncology referral was made.  - EMG on 3/11/2024 showed b/l (R>L) S1 radiculopathy (active).    Today, the patient symptoms are still the same with reports of pins and needles and shooting pain at times.  The pain and symptoms are mostly in the toes.  There is no new issues of weakness, and no positional component to symptoms with flexion or extension of the lower back.     Physical Exam:   General: Seated comfortably in no acute distress.  HEENT: Neck supple with normal range of motion.   Skin: No rashes  Neurologic:     Mental Status: Fully alert, attentive and oriented. Speech clear and fluent, no paraphasic errors.     Cranial Nerves: EOMI with normal smooth pursuit. Facial movements symmetric. Hearing not formally tested but intact to conversation.  No dysarthria.     Motor: No tremors or other abnormal movements observed.          Assessment and Plan:   Assessment:  B/l lumbar radiculopathy (S1)    The patient has distal symmetric sensory changes on exam, and her EMG shows a likely contributing factor to those symptoms (b/l S1 radiculopathy).  There is no definitve  weakness present, which is reassuring, but I do think imaging is needed to define why the roots are being irritated. We discussed that pending imaging results, a neurosurgery provider may need to get involved versus other more conservative treatments (ANGIE, medical management).  At this time, she may also benefit from PT and increasing her gabapentin (as the low night-time dose does seem to be helping).      Plan:  MRI lumbar spine w/wout contrast  Gabapentin 600 mg BID for 4 days, then   - TID  PT for lumbar radiculopathy (S1, b/l).    Follow up in Neurology clinic in 4-6 weeks, or earlier as needed should new concerns arise.    HELGA Sears D.O.   of Neurology    Total time today (27 min) in this patient encounter was spent on pre-charting, counseling and/or coordination of care.

## 2024-04-06 ENCOUNTER — HOSPITAL ENCOUNTER (OUTPATIENT)
Dept: MRI IMAGING | Facility: CLINIC | Age: 54
Discharge: HOME | End: 2024-04-06
Attending: PSYCHIATRY & NEUROLOGY | Admitting: PSYCHIATRY & NEUROLOGY
Payer: COMMERCIAL

## 2024-04-06 DIAGNOSIS — M54.16 LUMBAR RADICULOPATHY: ICD-10-CM

## 2024-04-06 PROCEDURE — A9585 GADOBUTROL INJECTION: HCPCS | Performed by: PSYCHIATRY & NEUROLOGY

## 2024-04-06 PROCEDURE — 255N000002 HC RX 255 OP 636: Performed by: PSYCHIATRY & NEUROLOGY

## 2024-04-06 PROCEDURE — 72158 MRI LUMBAR SPINE W/O & W/DYE: CPT

## 2024-04-06 RX ORDER — GADOBUTROL 604.72 MG/ML
11 INJECTION INTRAVENOUS ONCE
Status: COMPLETED | OUTPATIENT
Start: 2024-04-06 | End: 2024-04-06

## 2024-04-06 RX ADMIN — GADOBUTROL 11 ML: 604.72 INJECTION INTRAVENOUS at 13:38

## 2024-04-10 ENCOUNTER — THERAPY VISIT (OUTPATIENT)
Dept: PHYSICAL THERAPY | Facility: CLINIC | Age: 54
End: 2024-04-10
Attending: PSYCHIATRY & NEUROLOGY
Payer: COMMERCIAL

## 2024-04-10 DIAGNOSIS — M54.16 LUMBAR RADICULOPATHY: ICD-10-CM

## 2024-04-10 PROCEDURE — 97110 THERAPEUTIC EXERCISES: CPT | Mod: GP | Performed by: PHYSICAL THERAPIST

## 2024-04-10 PROCEDURE — 97161 PT EVAL LOW COMPLEX 20 MIN: CPT | Mod: GP | Performed by: PHYSICAL THERAPIST

## 2024-04-10 PROCEDURE — 97140 MANUAL THERAPY 1/> REGIONS: CPT | Mod: GP | Performed by: PHYSICAL THERAPIST

## 2024-04-10 NOTE — PROGRESS NOTES
PHYSICAL THERAPY EVALUATION  Type of Visit: Evaluation    See electronic medical record for Abuse and Falls Screening details.    Subjective      Presenting condition or subjective complaint: My biggest issue is what feels like neuropathy in my feet. Results of my EMG show maybe an issue in my lower back. I do have lower back and hip pain also but thought I work from home most of the time and sit most of the day.  Pt relates ongoing for approx 1 year. Pt relates shooting pain in B feet just there constantly. Pt relates worse in sitting. Pt does lack of sensation on genital but will see MD tomorrow. Pt denies loss of control bladder.   Date of onset: 04/10/23    Relevant medical history: Dizziness; High blood pressure; Overweight   Dates & types of surgery: Glomus jugular brain tumor removal 2007, bladder lift 2023, had bones of my feet broken and re-shaped 1990    Prior diagnostic imaging/testing results: MRI; EMG     Prior therapy history for the same diagnosis, illness or injury: No        Living Environment  Social support: With a significant other or spouse   Type of home: Multi-level; Other   Stairs to enter the home: Yes 2 Is there a railing: No   Ramp: No   Stairs inside the home: Yes 14 Is there a railing: Yes   Help at home: None  Equipment owned:       Employment: Yes Accounts receivable  Hobbies/Interests:      Patient goals for therapy: I would like the pain in my feet to get better. I would like to see if I can improve my ability to get around.       Objective   LUMBAR SPINE EVALUATION  PAIN: Pain Level at Rest: 0/10  Pain Level with Use: 10/10  Pain Location: lumbar spine  Pain Quality: Burning and Numb  Pain Frequency: constant  Pain is Exacerbated By: sitting  Pain is Relieved By: gabapentin  Pain Progression: Unchanged/worsened      POSTURE: Standing Posture: Lordosis increased, Thoracic kyphosis increased    GAIT:   WFL - trendelenburg       Weight Bearing Alignment: Lumbar/Pelvic WB Alignment:  increased lordosis       Reflexes: achilles: normal  patellar: normal    ROM:   Lumbar Side glide 100% 100%   Lumbar Flexion To floor   Lumbar Extension Neutral    Pain:   End feel:     PELVIC/SI SCREEN: Standing Forward Bend: negative , stork: negative, L innominate roation     Strength: functional hip and core weakness noted    MYOTOMES:    Left Right   T12-L3 (Hip Flexion) 5 5   L2-4 (Quads)  5 5   L4 (Ankle DF) 5 5   L5 (Great Toe Ext) 5 5   S1 (Toe Raise) 5 5       DERMATOMES:    Left Right   T12  Normal (light touch) Normal (light touch)   L1 Normal (light touch) Normal (light touch)   L2 Normal (light touch) Normal (light touch)   L3 Normal (light touch) Normal (light touch)   L4 Normal (light touch) Normal (light touch)   L5 Normal (light touch) Normal (light touch)   S1 impaired impaired       LUMBAR/HIP Special Tests:    Left Right   MIKE Negative  Negative    FADIR/Labrum/DORIAN Negative  Negative    Femoral Nerve     Ritika's     Piriformis Positive Positive   Quadrant Testing Negative  Negative    SLR Negative  Negative    Slump Negative  Negative    Stork with Extension Negative  Negative          Assessment & Plan   CLINICAL IMPRESSIONS   Medical Diagnosis: Lumbar radiculopathy    Treatment Diagnosis: LBP   Impression/Assessment: Patient is a 53 year old female with  complaints B foot numbness consistent with L5 radiculaopthy.  The following significant findings have been identified: Pain, Decreased ROM/flexibility, Decreased joint mobility, Decreased strength, Impaired balance, Decreased proprioception, and Impaired sensation. These impairments interfere with their ability to perform self care tasks, work tasks, recreational activities, household chores, driving , and household mobility as compared to previous level of function.     Clinical Decision Making (Complexity):   Clinical Presentation: Stable/Uncomplicated  Clinical Presentation Rationale: based on medical and personal factors listed in PT  evaluation  Clinical Decision Making (Complexity): Low complexity    PLAN OF CARE  Treatment Interventions:  Modalities: Cryotherapy, E-stim, Hot Pack, Mechanical Traction  Interventions: Gait Training, Manual Therapy, Neuromuscular Re-education, Therapeutic Activity, Therapeutic Exercise, Self-Care/Home Management    Long Term Goals     PT Goal 1  Goal Identifier: Sitting  Goal Description: Pt will be able to sit at desk with less than 1/10 foot pain  Target Date: 05/08/24  PT Goal 2  Goal Identifier: walking  Goal Description: Pt will be able to walk 30 min w less than 1/10 foot pain  Target Date: 06/05/24  PT Goal 3  Goal Identifier: HEP  Goal Description: Pt will be compliant and competent in HEP to allow them to achieve maximal strength and reduce pain mobility  Target Date: 06/05/24      Frequency of Treatment: 1x/week  Duration of Treatment: 8 weeks      Education Assessment:   Learner/Method: Patient    Risks and benefits of evaluation/treatment have been explained.   Patient/Family/caregiver agrees with Plan of Care.     Evaluation Time:     PT Eval, Low Complexity Minutes (66510): 25      Signing Clinician: Wanda Conner PT

## 2024-04-11 ENCOUNTER — TELEPHONE (OUTPATIENT)
Dept: NEUROLOGY | Facility: CLINIC | Age: 54
End: 2024-04-11

## 2024-04-11 NOTE — TELEPHONE ENCOUNTER
Left Voicemail (1st Attempt) and Sent Mychart (1st Attempt) for the patient to call back and schedule the following:    Appointment type: Return Neurology   Provider:    Return date: first available   Specialty phone number: [phone]  Additional appointment(s) needed: n/a  Additonal Notes: reschedule per IB message.      Dorie Wilder on 4/11/2024 at 10:18 AM

## 2024-04-11 NOTE — TELEPHONE ENCOUNTER
Hello, pt had video visit today with Dr. Sears. Unable to proceed with visits, as [place] was out of state. Please assist in re-scheduling pt. Pt is aware to be in [place] for virtual appt.     Thank you!  Helena Pappas, Virtual Visit Facilitator

## 2024-04-17 ENCOUNTER — THERAPY VISIT (OUTPATIENT)
Dept: PHYSICAL THERAPY | Facility: CLINIC | Age: 54
End: 2024-04-17
Attending: PSYCHIATRY & NEUROLOGY
Payer: COMMERCIAL

## 2024-04-17 DIAGNOSIS — M54.16 LUMBAR RADICULOPATHY: Primary | ICD-10-CM

## 2024-04-17 PROCEDURE — 97140 MANUAL THERAPY 1/> REGIONS: CPT | Mod: GP | Performed by: PHYSICAL THERAPIST

## 2024-04-17 PROCEDURE — 97110 THERAPEUTIC EXERCISES: CPT | Mod: GP | Performed by: PHYSICAL THERAPIST

## 2024-04-24 ENCOUNTER — THERAPY VISIT (OUTPATIENT)
Dept: PHYSICAL THERAPY | Facility: CLINIC | Age: 54
End: 2024-04-24
Attending: PSYCHIATRY & NEUROLOGY
Payer: COMMERCIAL

## 2024-04-24 DIAGNOSIS — M54.16 LUMBAR RADICULOPATHY: Primary | ICD-10-CM

## 2024-04-24 PROCEDURE — 97140 MANUAL THERAPY 1/> REGIONS: CPT | Mod: GP | Performed by: PHYSICAL THERAPIST

## 2024-04-24 PROCEDURE — 97012 MECHANICAL TRACTION THERAPY: CPT | Mod: GP | Performed by: PHYSICAL THERAPIST

## 2024-04-25 ENCOUNTER — VIRTUAL VISIT (OUTPATIENT)
Dept: NEUROLOGY | Facility: CLINIC | Age: 54
End: 2024-04-25
Payer: COMMERCIAL

## 2024-04-25 DIAGNOSIS — M54.16 LUMBAR RADICULOPATHY: Primary | ICD-10-CM

## 2024-04-25 PROCEDURE — 99213 OFFICE O/P EST LOW 20 MIN: CPT | Mod: 95 | Performed by: PSYCHIATRY & NEUROLOGY

## 2024-04-25 NOTE — PROGRESS NOTES
[facility] Neurology Follow Up Visit    Nilsa Vazquez MRN# [id number]   Age: 53 year old YOB: 1970     Brief history of symptoms: The patient was initially seen in neurologic consultation on 10/19/2023 for evaluation of altered sensations/neuropathy, and most recently seen 3/19/2024. Please see the comprehensive neurologic consultation notes from those dates in the Epic records for details.      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  She was to obtain an EMG, serum studies, and trial gabapentin for symptoms management.     Interval history:   - Serum studies did reveal elevated CRP, ESR as well as monoclonal austen elevations. Rheumatology and hematology referrals were made.  - EMG on 3/11/2024 showed b/l (R>L) S1 radiculopathy that were active.  - At our last visit, the patient was to obtain an MRI lumbar spine, and see PT for S1 radiculopathy stretching/treatment              - MRI Lumbar spine on 4/6/2024 showed L5-S1 concentric disc bulge with moderate to severe left lateral recess stenosis (contact/compression of descending L-S1 root) as well as a moderate left and right neural foraminal stenosis leading to possible R-L5 nerve root compression at the same level.    Today, the patient is doing PT.  She hasn't noticed any change in her symptoms with three therapies.  She actually has more pain with stretching.  The patient does feel that she can tell when she urinates, and her genital region has had limitations in terms of function.  There are no issues with defecation.      Physical Exam:   General: Seated comfortably in no acute distress.  HEENT: Neck supple with normal range of motion.   Skin: No rashes  Neurologic:     Mental Status: Fully alert, attentive and oriented. Speech clear and fluent, no paraphasic errors.     Cranial Nerves: EOMI with normal smooth pursuit. Facial movements symmetric. Hearing  not formally tested but intact to conversation.  No dysarthria.     Motor: No tremors or other abnormal movements observed.          Assessment and Plan:   Assessment:  B/l S1 radiculopathy    The patient's radiculopathy are not improving with PT or responsive to a great deal with use of gabapentin. She has groin numbness at this time to go along with shooting pain, but lacks some defined weakness. I think she would benefit from ANGIE for the S1 nerve roots, but would want her to also see neurosurgery to better define if any further intervention may be needed.     Plan:  ANGIE b/l S1  Neurosurgery consultation    Follow up in Neurology clinic in 3 months or earlier as needed should new concerns arise.    HELGA Sears D.O.   of Neurology    Total time today (27 min) in this patient encounter was spent on pre-charting, counseling and/or coordination of care.

## 2024-04-25 NOTE — LETTER
4/25/2024       RE: Nilsa Vazquez  [address] [facility] [place] [address]       Dear Colleague,    Thank you for referring your patient, Nilsa Vazquez, to the [facility] NEUROLOGY [facility]INIC [place] [facility]. Please see a copy of my visit note below.    [facility] Neurology Follow Up Visit    Nilsa Vazquez MRN# [id number]   Age: 53 year old YOB: 1970     Brief history of symptoms: The patient was initially seen in neurologic consultation on 10/19/2023 for evaluation of altered sensations/neuropathy, and most recently seen 3/19/2024. Please see the comprehensive neurologic consultation notes from those dates in the Epic records for details.      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  She was to obtain an EMG, serum studies, and trial gabapentin for symptoms management.     Interval history:   - Serum studies did reveal elevated CRP, ESR as well as monoclonal austen elevations. Rheumatology and hematology referrals were made.  - EMG on 3/11/2024 showed b/l (R>L) S1 radiculopathy that were active.  - At our last visit, the patient was to obtain an MRI lumbar spine, and see PT for S1 radiculopathy stretching/treatment              - MRI Lumbar spine on 4/6/2024 showed L5-S1 concentric disc bulge with moderate to severe left lateral recess stenosis (contact/compression of descending L-S1 root) as well as a moderate left and right neural foraminal stenosis leading to possible R-L5 nerve root compression at the same level.    Today, the patient is doing PT.  She hasn't noticed any change in her symptoms with three therapies.  She actually has more pain with stretching.  The patient does feel that she can tell when she urinates, and her genital region has had limitations in terms of function.  There are no issues with defecation.      Physical Exam:    General: Seated comfortably in no acute distress.  HEENT: Neck supple with normal range of motion.   Skin: No rashes  Neurologic:     Mental Status: Fully alert, attentive and oriented. Speech clear and fluent, no paraphasic errors.     Cranial Nerves: EOMI with normal smooth pursuit. Facial movements symmetric. Hearing not formally tested but intact to conversation.  No dysarthria.     Motor: No tremors or other abnormal movements observed.          Assessment and Plan:   Assessment:  B/l S1 radiculopathy    The patient's radiculopathy are not improving with PT or responsive to a great deal with use of gabapentin. She has groin numbness at this time to go along with shooting pain, but lacks some defined weakness. I think she would benefit from ANGIE for the S1 nerve roots, but would want her to also see neurosurgery to better define if any further intervention may be needed.     Plan:  ANGIE b/l S1  Neurosurgery consultation    Follow up in Neurology clinic in 3 months or earlier as needed should new concerns arise.      Total time today (27 min) in this patient encounter was spent on pre-charting, counseling and/or coordination of care.       Again, thank you for allowing me to participate in the care of your patient.      Sincerely,    Severo Sears, DO

## 2024-04-25 NOTE — NURSING NOTE
Is the patient currently in the state of [place]? YES    Visit mode:VIDEO    If the visit is dropped, the patient can be reconnected by: VIDEO VISIT: Text to cell phone:   Telephone Information:   Mobile [phone]       Will anyone else be joining the visit? NO  (If patient encounters technical issues they should call [phone]    How would you like to obtain your AVS? MyChart    Are changes needed to the allergy or medication list? No, Pt stated no changes to allergies, and Pt stated no med changes    Are refills needed on medications prescribed by this physician? NO    Reason for visit: FRANCE GONZALEZ

## 2024-04-26 NOTE — TELEPHONE ENCOUNTER
SPINE PATIENTS - NEW PROTOCOL PREVISIT    RECORDS RECEIVED FROM: Referred by , Severo Vargas,    REASON FOR VISIT: Lumbar radiculopathy    PROVIDER: [facility]   DATE OF APPT: 05/02/2024   NOTES (FOR ALL VISITS) STATUS DETAILS   OFFICE NOTE from referring provider Internal Referral and notes in chart   OFFICE NOTE from other specialist Internal PT last completed 04/24/2024   DISCHARGE SUMMARY from hospital N/A    DISCHARGE REPORT from ER N/A    OPERATIVE REPORT N/A    EMG REPORT N/A    MEDICATION LIST N/A    IMAGING  (FOR ALL VISITS)     MRI (HEAD, NECK, SPINE) Internal MRI Lumbar 04/06/2024   XRAY (SPINE) *NEUROSURGERY* N/A    CT (HEAD, NECK, SPINE) N/A

## 2024-05-02 ENCOUNTER — HOSPITAL ENCOUNTER (OUTPATIENT)
Dept: RADIOLOGY | Facility: HOSPITAL | Age: 54
Discharge: HOME | End: 2024-05-02
Attending: SURGERY | Admitting: SURGERY
Payer: COMMERCIAL

## 2024-05-02 ENCOUNTER — PRE VISIT (OUTPATIENT)
Dept: NEUROSURGERY | Facility: CLINIC | Age: 54
End: 2024-05-02

## 2024-05-02 ENCOUNTER — THERAPY VISIT (OUTPATIENT)
Dept: PHYSICAL THERAPY | Facility: CLINIC | Age: 54
End: 2024-05-02
Attending: PSYCHIATRY & NEUROLOGY
Payer: COMMERCIAL

## 2024-05-02 ENCOUNTER — OFFICE VISIT (OUTPATIENT)
Dept: NEUROSURGERY | Facility: CLINIC | Age: 54
End: 2024-05-02
Attending: PSYCHIATRY & NEUROLOGY
Payer: COMMERCIAL

## 2024-05-02 VITALS
HEIGHT: 66 IN | HEART RATE: 82 BPM | SYSTOLIC BLOOD PRESSURE: 227 MMHG | DIASTOLIC BLOOD PRESSURE: 105 MMHG | OXYGEN SATURATION: 96 % | WEIGHT: 253 LBS | BODY MASS INDEX: 40.66 KG/M2

## 2024-05-02 DIAGNOSIS — M54.16 LUMBAR RADICULOPATHY: ICD-10-CM

## 2024-05-02 DIAGNOSIS — M54.16 LUMBAR RADICULOPATHY: Primary | ICD-10-CM

## 2024-05-02 PROCEDURE — 72120 X-RAY BEND ONLY L-S SPINE: CPT

## 2024-05-02 PROCEDURE — 99213 OFFICE O/P EST LOW 20 MIN: CPT | Performed by: SURGERY

## 2024-05-02 PROCEDURE — 97110 THERAPEUTIC EXERCISES: CPT | Mod: GP | Performed by: PHYSICAL THERAPIST

## 2024-05-02 PROCEDURE — 97140 MANUAL THERAPY 1/> REGIONS: CPT | Mod: GP | Performed by: PHYSICAL THERAPIST

## 2024-05-02 ASSESSMENT — PAIN SCALES - GENERAL: PAINLEVEL: MODERATE PAIN (5)

## 2024-05-02 NOTE — LETTER
5/2/2024         RE: Nilsa Vazquez  [address] [place] [id number]        Dear Colleague,    Thank you for referring your patient, Nilsa Vazquez, to the [facility] SPINE AND NEUROSURGERY. Please see a copy of my visit note below.    NEUROSURGERY CONSULTATION NOTE      Neurosurgery was asked to see this patient by Severo Sears for evaluation of lumbar stenosis.       CONSULTATION ASSESSMENT AND PLAN:    53-year-old female who presents with low back and bilateral buttock pain as well as toe numbness bilaterally.  Also intermittent diffuse numbness in her legs. She showed a mild S1 radiculopathy right greater than left.  MRI of her lumbar spine shows very little canal stenosis or neuro foraminal narrowing except at lumbar 5-sacral 1 she has mild right lateral recess stenosis with moderate to severe left lateral recess stenosis and mild to moderate left and moderate right foraminal narrowing at lumbar 5-sacral 1.  Her first x-ray showed a grade 1 anterolisthesis of 4 mm with possible segmental instability however there was question of oblique notes of the images.  Her films were repeated which shows a 4 mm anterolisthesis lumbar 5-sacral 1 does not appear to significantly change with flexion extension.  On my review of the images the patient her stenosis does not appear severe particularly of her S1 nerves.  We will get MRI of the cervical and thoracic spine to rule out any other areas of narrowing.  She did previously have fairly severe numbness of her arm as well that resolved.  She will return to clinic after these additional images are obtained.    Vanita Das MD      HISTORY OF PRESENTING ILLNESS:    54 yo female who presents with toe numbness. Both of her feet have toe numbness. 4 occasion of more diffuse numbness in her legs. She doesn't pay attention to what leg it is or distribution when more diffuse. Happens with prolonged activity upright. Pain is in her  lower back and radiates into bilateral buttocks. When symptoms severe can barely walk. No bowel or bladder loss. Has loss of sensation in her groin. Feet and toes also have shooting pain.     Previous numbness in right arm as well.     Physical therapy- worsened her pain  Epidural ordered. Has not had yet.   Gabapentin -cannot tolerate during the day. Ibuprofen no relief    Past Medical History:   Diagnosis Date     Abnormal Pap smear of cervix 2019    see problem list     Hypertension      Paraganglioma (H) 2007       Past Surgical History:   Procedure Laterality Date     COLONOSCOPY  2022     craniotomy N/A 2007     CYSTOSCOPY, SLING TRANSVAGINAL N/A 10/17/2022    Procedure: TRANSVAGINAL SLING, WITH CYSTOSCOPY;  Surgeon: Homar Clark MD;  Location: [facility] OR     ENT SURGERY  Feb 2007     HEAD & NECK SURGERY  Feb 2007     ORTHOPEDIC SURGERY       SURGICAL HISTORY OF -   1992 approx    Bilateral feet repair, with pins in place     VASCULAR SURGERY         REVIEW OF SYSTEMS:  See ROS form under media     MEDICATIONS:    Current Outpatient Medications   Medication Sig Dispense Refill     diphenhydrAMINE (BENADRYL) 25 MG capsule Take 25 mg by mouth at bedtime       gabapentin (NEURONTIN) 600 MG tablet Take 1 tablet (600 mg) by mouth 3 times daily 90 tablet 11     losartan (COZAAR) 50 MG tablet Take 2 tablets (100 mg) by mouth daily 180 tablet 3     multivitamin, therapeutic (THERA-VIT) TABS tablet Take 1 tablet by mouth daily Separate vitamins       prochlorperazine (COMPAZINE) 10 MG tablet Take 1 tablet (10 mg) by mouth every 6 hours as needed for nausea or vomiting (migraine) 30 tablet 3     rizatriptan (MAXALT) 5 MG tablet Take 1 tablet (5 mg) by mouth at onset of headache for migraine (repeat in 2 hours if needed) Do not take if bp is >160/100. 18 tablet 11     topiramate (TOPAMAX) 25 MG tablet Take 3 tablets (75 mg) by mouth every evening 90 tablet 11     vitamin B6 (PYRIDOXINE) 50 MG TABS Take 1 tablet (50  "mg) by mouth daily 30 tablet 5     zolpidem (AMBIEN) 5 MG tablet TAKE 1 TABLET (5 MG) BY MOUTH EVERY EVENING AS NEEDED FOR SLEEP 30 tablet 5         ALLERGIES/SENSITIVITIES:     No Known Allergies    PERTINENT SOCIAL HISTORY:   Social History     Socioeconomic History     Marital status:      Spouse name: Saurabh     Number of children: 2   Occupational History     Occupation: accounts receivable   Tobacco Use     Smoking status: Former     Current packs/day: 0.00     Average packs/day: 1 pack/day for 25.0 years (25.0 ttl pk-yrs)     Types: Cigarettes     Start date: 1980     Quit date: 2005     Years since quittin.7     Smokeless tobacco: Never   Vaping Use     Vaping status: Never Used   Substance and Sexual Activity     Alcohol use: Yes     Comment: occasionally     Drug use: No     Sexual activity: Yes     Partners: Male     Birth control/protection: Male Surgical   Other Topics Concern     Parent/sibling w/ CABG, MI or angioplasty before 65F 55M? No         FAMILY HISTORY:  Family History   Problem Relation Age of Onset     Diabetes Mother         type 2     Hypertension Mother      Heart Failure Mother 75     Diabetes Father         type 2     Hypertension Father      Genitourinary Problems Father         kidney stones     Cancer Father 75        bladder cancer     Prostate Cancer Father      Breast Cancer Maternal Grandmother      Diabetes Maternal Grandmother      Hypertension Maternal Grandmother      Cerebrovascular Disease Maternal Grandmother      Hypertension Brother      Blood Disease Brother         blood clot in leg     Colon Cancer Maternal Grandfather         PHYSICAL EXAM:   Constitutional: BP (!) 227/105   Pulse 82   Ht 1.676 m (5' 6\")   Wt 114.8 kg (253 lb)   SpO2 96%   BMI 40.84 kg/m       Mental Status: A & O in no acute distress.  Affect is appropriate.  Speech is fluent.  Recent and remote memory are intact.  Attention span and concentration are normal.     Motor: Normal " bulk and tone all muscle groups of upper and lower extremities.    Strength: 5/5 x 4     Sensory: Sensation intact bilaterally to light touch expect diminished over distal toes only      Coordination; Heel/toe gait intact.  Normal gait and station.     Reflexes; supinator, biceps, triceps, knee/ ankle jerk intact 1+.    IMAGING:  I personally reviewed all radiographic images         Cc:   Claudia Mitchell             Again, thank you for allowing me to participate in the care of your patient.        Sincerely,        Vanita Das MD

## 2024-05-02 NOTE — NURSING NOTE
"Nilsa Vazquez is a 53 year old female who presents for:  Chief Complaint   Patient presents with    Consult     Lumbar radiculopathy. Patient reports lower back pain and pinched nerves, patient states numbness and stabbing pains in toes, leg pain mostly in left. Patient reports pain has gotten worse with therapy, noticing issues with walking and is more concerned about feet numbness and pain more than back        Initial Vitals:  BP (!) 227/105   Pulse 82   Ht 5' 6\" (1.676 m)   Wt 253 lb (114.8 kg)   SpO2 96%   BMI 40.84 kg/m   Estimated body mass index is 40.84 kg/m  as calculated from the following:    Height as of this encounter: 5' 6\" (1.676 m).    Weight as of this encounter: 253 lb (114.8 kg).. Body surface area is 2.31 meters squared. BP completed using cuff size: regular  Moderate Pain (5)        Eduardo Gill    "

## 2024-05-02 NOTE — PROGRESS NOTES
NEUROSURGERY CONSULTATION NOTE      Neurosurgery was asked to see this patient by Severo Sears for evaluation of lumbar stenosis.       CONSULTATION ASSESSMENT AND PLAN:    53-year-old female who presents with low back and bilateral buttock pain as well as toe numbness bilaterally.  Also intermittent diffuse numbness in her legs. She showed a mild S1 radiculopathy right greater than left.  MRI of her lumbar spine shows very little canal stenosis or neuro foraminal narrowing except at lumbar 5-sacral 1 she has mild right lateral recess stenosis with moderate to severe left lateral recess stenosis and mild to moderate left and moderate right foraminal narrowing at lumbar 5-sacral 1.  Her first x-ray showed a grade 1 anterolisthesis of 4 mm with possible segmental instability however there was question of oblique notes of the images.  Her films were repeated which shows a 4 mm anterolisthesis lumbar 5-sacral 1 does not appear to significantly change with flexion extension.  On my review of the images the patient her stenosis does not appear severe particularly of her S1 nerves.  We will get MRI of the cervical and thoracic spine to rule out any other areas of narrowing.  She did previously have fairly severe numbness of her arm as well that resolved.  She will return to clinic after these additional images are obtained.    Vanita Das MD      HISTORY OF PRESENTING ILLNESS:    54 yo female who presents with toe numbness. Both of her feet have toe numbness. 4 occasion of more diffuse numbness in her legs. She doesn't pay attention to what leg it is or distribution when more diffuse. Happens with prolonged activity upright. Pain is in her lower back and radiates into bilateral buttocks. When symptoms severe can barely walk. No bowel or bladder loss. Has loss of sensation in her groin. Feet and toes also have shooting pain.     Previous numbness in right arm as well.     Physical therapy- worsened  her pain  Epidural ordered. Has not had yet.   Gabapentin -cannot tolerate during the day. Ibuprofen no relief    Past Medical History:   Diagnosis Date    Abnormal Pap smear of cervix 2019    see problem list    Hypertension     Paraganglioma (H) 2007       Past Surgical History:   Procedure Laterality Date    COLONOSCOPY  2022    craniotomy N/A 2007    CYSTOSCOPY, SLING TRANSVAGINAL N/A 10/17/2022    Procedure: TRANSVAGINAL SLING, WITH CYSTOSCOPY;  Surgeon: Homar Clark MD;  Location: [facility]    ENT SURGERY  Feb 2007    HEAD & NECK SURGERY  Feb 2007    ORTHOPEDIC SURGERY      SURGICAL HISTORY OF -   1992 approx    Bilateral feet repair, with pins in place    VASCULAR SURGERY         REVIEW OF SYSTEMS:  See ROS form under media     MEDICATIONS:    Current Outpatient Medications   Medication Sig Dispense Refill    diphenhydrAMINE (BENADRYL) 25 MG capsule Take 25 mg by mouth at bedtime      gabapentin (NEURONTIN) 600 MG tablet Take 1 tablet (600 mg) by mouth 3 times daily 90 tablet 11    losartan (COZAAR) 50 MG tablet Take 2 tablets (100 mg) by mouth daily 180 tablet 3    multivitamin, therapeutic (THERA-VIT) TABS tablet Take 1 tablet by mouth daily Separate vitamins      prochlorperazine (COMPAZINE) 10 MG tablet Take 1 tablet (10 mg) by mouth every 6 hours as needed for nausea or vomiting (migraine) 30 tablet 3    rizatriptan (MAXALT) 5 MG tablet Take 1 tablet (5 mg) by mouth at onset of headache for migraine (repeat in 2 hours if needed) Do not take if bp is >160/100. 18 tablet 11    topiramate (TOPAMAX) 25 MG tablet Take 3 tablets (75 mg) by mouth every evening 90 tablet 11    vitamin B6 (PYRIDOXINE) 50 MG TABS Take 1 tablet (50 mg) by mouth daily 30 tablet 5    zolpidem (AMBIEN) 5 MG tablet TAKE 1 TABLET (5 MG) BY MOUTH EVERY EVENING AS NEEDED FOR SLEEP 30 tablet 5         ALLERGIES/SENSITIVITIES:     No Known Allergies    PERTINENT SOCIAL HISTORY:   Social History     Socioeconomic History    Marital  "status:      Spouse name: Saurabh    Number of children: 2   Occupational History    Occupation: accounts receivable   Tobacco Use    Smoking status: Former     Current packs/day: 0.00     Average packs/day: 1 pack/day for 25.0 years (25.0 ttl pk-yrs)     Types: Cigarettes     Start date: 1980     Quit date: 2005     Years since quittin.7    Smokeless tobacco: Never   Vaping Use    Vaping status: Never Used   Substance and Sexual Activity    Alcohol use: Yes     Comment: occasionally    Drug use: No    Sexual activity: Yes     Partners: Male     Birth control/protection: Male Surgical   Other Topics Concern    Parent/sibling w/ CABG, MI or angioplasty before 65F 55M? No         FAMILY HISTORY:  Family History   Problem Relation Age of Onset    Diabetes Mother         type 2    Hypertension Mother     Heart Failure Mother 75    Diabetes Father         type 2    Hypertension Father     Genitourinary Problems Father         kidney stones    Cancer Father 75        bladder cancer    Prostate Cancer Father     Breast Cancer Maternal Grandmother     Diabetes Maternal Grandmother     Hypertension Maternal Grandmother     Cerebrovascular Disease Maternal Grandmother     Hypertension Brother     Blood Disease Brother         blood clot in leg    Colon Cancer Maternal Grandfather         PHYSICAL EXAM:   Constitutional: BP (!) 227/105   Pulse 82   Ht 1.676 m (5' 6\")   Wt 114.8 kg (253 lb)   SpO2 96%   BMI 40.84 kg/m       Mental Status: A & O in no acute distress.  Affect is appropriate.  Speech is fluent.  Recent and remote memory are intact.  Attention span and concentration are normal.     Motor: Normal bulk and tone all muscle groups of upper and lower extremities.    Strength: 5/5 x 4     Sensory: Sensation intact bilaterally to light touch expect diminished over distal toes only      Coordination; Heel/toe gait intact.  Normal gait and station.     Reflexes; supinator, biceps, triceps, knee/ ankle " ciaran intact 1+.    IMAGING:  I personally reviewed all radiographic images         Cc:   Claudia Mitchell

## 2024-05-03 ENCOUNTER — HOSPITAL ENCOUNTER (OUTPATIENT)
Dept: GENERAL RADIOLOGY | Facility: CLINIC | Age: 54
Discharge: HOME | End: 2024-05-03
Attending: SURGERY | Admitting: SURGERY
Payer: COMMERCIAL

## 2024-05-03 DIAGNOSIS — M54.16 LUMBAR RADICULOPATHY: Primary | ICD-10-CM

## 2024-05-03 DIAGNOSIS — M54.16 LUMBAR RADICULOPATHY: ICD-10-CM

## 2024-05-03 PROCEDURE — 72110 X-RAY EXAM L-2 SPINE 4/>VWS: CPT

## 2024-05-06 ENCOUNTER — TELEPHONE (OUTPATIENT)
Dept: FAMILY MEDICINE | Facility: CLINIC | Age: 54
End: 2024-05-06

## 2024-05-06 ENCOUNTER — ALLIED HEALTH/NURSE VISIT (OUTPATIENT)
Dept: FAMILY MEDICINE | Facility: CLINIC | Age: 54
End: 2024-05-06
Payer: COMMERCIAL

## 2024-05-06 ENCOUNTER — THERAPY VISIT (OUTPATIENT)
Dept: PHYSICAL THERAPY | Facility: CLINIC | Age: 54
End: 2024-05-06
Attending: PSYCHIATRY & NEUROLOGY
Payer: COMMERCIAL

## 2024-05-06 VITALS — DIASTOLIC BLOOD PRESSURE: 88 MMHG | SYSTOLIC BLOOD PRESSURE: 140 MMHG | RESPIRATION RATE: 16 BRPM | HEART RATE: 80 BPM

## 2024-05-06 DIAGNOSIS — M54.16 LUMBAR RADICULOPATHY: Primary | ICD-10-CM

## 2024-05-06 DIAGNOSIS — I10 ESSENTIAL HYPERTENSION, BENIGN: Primary | ICD-10-CM

## 2024-05-06 PROCEDURE — 97110 THERAPEUTIC EXERCISES: CPT | Mod: GP | Performed by: PHYSICAL THERAPIST

## 2024-05-06 PROCEDURE — 99207 PR NO CHARGE NURSE ONLY: CPT

## 2024-05-06 RX ORDER — HYDROCHLOROTHIAZIDE 25 MG/1
25 TABLET ORAL DAILY
Qty: 90 TABLET | Refills: 0 | Status: SHIPPED | OUTPATIENT
Start: 2024-05-06 | End: 2024-07-30

## 2024-05-06 NOTE — PROGRESS NOTES
BP Readings from Last 6 Encounters:   05/02/24 (!) 227/105   01/24/24 (!) 164/85   11/30/23 (!) 191/98   11/21/23 (!) 168/85   09/01/23 120/78   10/17/22 (!) 165/84     Nilsa Vazquez is a 53 year old year old patient who comes in today for a Blood Pressure check because of BP high at last appointment..  Vital Signs as repeated by /82 and 6 minutes later 140/88  Patient is taking medication as prescribed.  Currently on Losartan 50 mg daily  Patient is tolerating medications well.  Patient is not monitoring Blood Pressure at home.    Current complaints: none  Disposition:  will send this encounter to Dr Mitchell for review  Kelsea Torres RN

## 2024-05-06 NOTE — TELEPHONE ENCOUNTER
Nilsa Vazquez is a 53 year old year old patient who comes in today for a Blood Pressure check because of BP high at last appointment..  Vital Signs as repeated by /82 and 6 minutes later 140/88  Patient is taking medication as prescribed.  Currently on Losartan 50 mg daily  Patient is tolerating medications well.  Patient is not monitoring Blood Pressure at home.    Current complaints: none  Disposition:  will send this encounter to Dr Mitchell for review  Kelsea Torres RN      BP Readings from Last 6 Encounters:   05/06/24 (!) 140/88   05/02/24 (!) 227/105   01/24/24 (!) 164/85   11/30/23 (!) 191/98   11/21/23 (!) 168/85   09/01/23 120/78

## 2024-05-06 NOTE — TELEPHONE ENCOUNTER
Please have her increase losarten to 100mg daily   Repeat chem 8 and bp check in 2 weeks with RN visit

## 2024-05-10 ENCOUNTER — TELEPHONE (OUTPATIENT)
Dept: NEUROLOGY | Facility: CLINIC | Age: 54
End: 2024-05-10
Payer: COMMERCIAL

## 2024-05-10 NOTE — TELEPHONE ENCOUNTER
Left Voicemail (1st Attempt) and Sent Mychart (1st Attempt) for the patient to call back and schedule the following:    Appointment type: Return Neuro  Provider: Drew  Return date: July/August  Specialty phone number: [phone]  Additional appointment(s) needed:   Additonal Notes: Virtual Visit    Alma Schmitz on 5/10/2024 at 9:43 AM

## 2024-05-14 ENCOUNTER — THERAPY VISIT (OUTPATIENT)
Dept: PHYSICAL THERAPY | Facility: CLINIC | Age: 54
End: 2024-05-14
Attending: PSYCHIATRY & NEUROLOGY
Payer: COMMERCIAL

## 2024-05-14 DIAGNOSIS — M54.16 LUMBAR RADICULOPATHY: Primary | ICD-10-CM

## 2024-05-14 PROCEDURE — 97140 MANUAL THERAPY 1/> REGIONS: CPT | Mod: GP | Performed by: PHYSICAL THERAPIST

## 2024-05-15 ENCOUNTER — HOSPITAL ENCOUNTER (OUTPATIENT)
Dept: MRI IMAGING | Facility: CLINIC | Age: 54
Discharge: HOME | End: 2024-05-15
Attending: SURGERY | Admitting: SURGERY
Payer: COMMERCIAL

## 2024-05-15 ENCOUNTER — TELEPHONE (OUTPATIENT)
Dept: NEUROLOGY | Facility: CLINIC | Age: 54
End: 2024-05-15
Payer: COMMERCIAL

## 2024-05-15 DIAGNOSIS — M54.16 LUMBAR RADICULOPATHY: ICD-10-CM

## 2024-05-15 PROCEDURE — 72141 MRI NECK SPINE W/O DYE: CPT

## 2024-05-15 PROCEDURE — 72146 MRI CHEST SPINE W/O DYE: CPT

## 2024-05-15 NOTE — TELEPHONE ENCOUNTER
Left Voicemail (2nd Attempt) for the patient to call back and schedule the following:    Appointment type: Return Neurology, virtual  Provider: Renu  Return date: 7/25, 11am or 11:30a DAISY spot ok to use  Specialty phone number: [phone]  Additional appointment(s) needed:   Additonal Notes: ROBERT Schmitz on 5/15/2024 at 12:35 PM

## 2024-05-17 ASSESSMENT — MIGRAINE DISABILITY ASSESSMENT (MIDAS)
HOW MANY DAYS IN THE PAST 3 MONTHS HAVE YOU HAD A HEADACHE: 10
HOW OFTEN WERE SOCIAL ACTIVITIES MISSED DUE TO HEADACHES: 0
ON A SCALE FROM 0-10 ON AVERAGE HOW PAINFUL WERE HEADACHES: 4
HOW MANY DAYS DID YOU MISS WORK OR SCHOOL BECAUSE OF HEADACHES: 0
HOW MANY DAYS WAS YOUR PRODUCTIVITY CUT IN HALF BECAUSE OF HEADACHES: 1
HOW MANY DAYS WAS HOUSEWORK PRODUCTIVITY CUT IN HALF DUE TO HEADACHES: 2
TOTAL SCORE: 5
HOW MANY DAYS DID YOU NOT DO HOUSEWORK BECAUSE OF HEADACHES: 2

## 2024-05-17 ASSESSMENT — HEADACHE IMPACT TEST (HIT 6)
HOW OFTEN HAVE YOU FELT FED UP OR IRRITATED BECAUSE OF YOUR HEADACHES: NEVER
HOW OFTEN DO HEADACHES LIMIT YOUR DAILY ACTIVITIES: SOMETIMES
WHEN YOU HAVE A HEADACHE HOW OFTEN DO YOU WISH YOU COULD LIE DOWN: SOMETIMES
WHEN YOU HAVE HEADACHES HOW OFTEN IS THE PAIN SEVERE: SOMETIMES
HOW OFTEN DID HEADACHS LIMIT CONCENTRATION ON WORK OR DAILY ACTIVITY: RARELY
HIT6 TOTAL SCORE: 52
HOW OFTEN HAVE YOU FELT TOO TIRED TO WORK BECAUSE OF YOUR HEADACHES: RARELY

## 2024-05-21 ENCOUNTER — ALLIED HEALTH/NURSE VISIT (OUTPATIENT)
Dept: FAMILY MEDICINE | Facility: CLINIC | Age: 54
End: 2024-05-21
Payer: COMMERCIAL

## 2024-05-21 ENCOUNTER — LAB (OUTPATIENT)
Dept: LAB | Facility: CLINIC | Age: 54
End: 2024-05-21
Payer: COMMERCIAL

## 2024-05-21 ENCOUNTER — VIRTUAL VISIT (OUTPATIENT)
Dept: NEUROSURGERY | Facility: CLINIC | Age: 54
End: 2024-05-21
Payer: COMMERCIAL

## 2024-05-21 ENCOUNTER — TELEPHONE (OUTPATIENT)
Dept: FAMILY MEDICINE | Facility: CLINIC | Age: 54
End: 2024-05-21

## 2024-05-21 VITALS — DIASTOLIC BLOOD PRESSURE: 76 MMHG | HEART RATE: 88 BPM | SYSTOLIC BLOOD PRESSURE: 128 MMHG

## 2024-05-21 DIAGNOSIS — M54.16 LUMBAR RADICULOPATHY: Primary | ICD-10-CM

## 2024-05-21 DIAGNOSIS — I10 ESSENTIAL HYPERTENSION, BENIGN: Primary | ICD-10-CM

## 2024-05-21 DIAGNOSIS — I10 ESSENTIAL HYPERTENSION, BENIGN: ICD-10-CM

## 2024-05-21 LAB
ANION GAP SERPL CALCULATED.3IONS-SCNC: 18 MMOL/L (ref 7–15)
BUN SERPL-MCNC: 19.1 MG/DL (ref 6–20)
CALCIUM SERPL-MCNC: 9.5 MG/DL (ref 8.6–10)
CHLORIDE SERPL-SCNC: 97 MMOL/L (ref 98–107)
CREAT SERPL-MCNC: 0.85 MG/DL (ref 0.51–0.95)
DEPRECATED HCO3 PLAS-SCNC: 20 MMOL/L (ref 22–29)
EGFRCR SERPLBLD CKD-EPI 2021: 81 ML/MIN/1.73M2
GLUCOSE SERPL-MCNC: 96 MG/DL (ref 70–99)
POTASSIUM SERPL-SCNC: 3.7 MMOL/L (ref 3.4–5.3)
SODIUM SERPL-SCNC: 135 MMOL/L (ref 135–145)

## 2024-05-21 PROCEDURE — 36415 COLL VENOUS BLD VENIPUNCTURE: CPT

## 2024-05-21 PROCEDURE — 99441 PR PHYSICIAN TELEPHONE EVALUATION 5-10 MIN: CPT | Mod: 93 | Performed by: SURGERY

## 2024-05-21 PROCEDURE — 99207 PR NO CHARGE NURSE ONLY: CPT

## 2024-05-21 PROCEDURE — 80048 BASIC METABOLIC PNL TOTAL CA: CPT

## 2024-05-21 NOTE — PROGRESS NOTES
Billable Telephone Visit    Patient is a 53-year-old female who presents with low back and bilateral buttock pain as well as toe numbness bilaterally.  Also intermittent diffuse numbness in her legs. She showed a mild S1 radiculopathy right greater than left.  MRI of her lumbar spine shows very little canal stenosis or neuro foraminal narrowing except at lumbar 5-sacral 1 she has mild right lateral recess stenosis with moderate to severe left lateral recess stenosis and mild to moderate left and moderate right foraminal narrowing at lumbar 5-sacral 1.  Her first x-ray showed a grade 1 anterolisthesis of 4 mm with possible segmental instability however there was question of oblique notes of the images.  Her films were repeated which shows a 4 mm anterolisthesis lumbar 5-sacral 1 does not appear to significantly change with flexion extension.  On my review of the images the patient her stenosis does not appear severe particularly of her S1 nerves.  We will get MRI of the cervical and thoracic spine to rule out any other areas of narrowing.  She did previously have fairly severe numbness of her arm as well that resolved.  She will return to clinic after these additional images are obtained.     Since our last visit, she has undergone cervical and thoracic MRI. These were negative for any significant central narrowing. Was noted to have moderate foraminal at bilateral C5-6 and left C6-7. This may be responsible for past numbness that resolved in her arm. She continues to have back and buttock pain. She will continue with PT. We also discussed proceeding with bilateral lumbar 5-sacral 1 TFESI. She will return to clinic after the spinal injections.     Location of Dr Das: [facility] [place], [place]  Location of patient: [place]  Length of Visit: 6:55 minutes    Vanita Das MD

## 2024-05-21 NOTE — LETTER
5/21/2024         RE: Nilsa Vazquez  [address] [place] [id number]        Dear Colleague,    Thank you for referring your patient, Nilsa Vazquez, to the [facility]. Please see a copy of my visit note below.    Billable Telephone Visit    Patient is a 53-year-old female who presents with low back and bilateral buttock pain as well as toe numbness bilaterally.  Also intermittent diffuse numbness in her legs. She showed a mild S1 radiculopathy right greater than left.  MRI of her lumbar spine shows very little canal stenosis or neuro foraminal narrowing except at lumbar 5-sacral 1 she has mild right lateral recess stenosis with moderate to severe left lateral recess stenosis and mild to moderate left and moderate right foraminal narrowing at lumbar 5-sacral 1.  Her first x-ray showed a grade 1 anterolisthesis of 4 mm with possible segmental instability however there was question of oblique notes of the images.  Her films were repeated which shows a 4 mm anterolisthesis lumbar 5-sacral 1 does not appear to significantly change with flexion extension.  On my review of the images the patient her stenosis does not appear severe particularly of her S1 nerves.  We will get MRI of the cervical and thoracic spine to rule out any other areas of narrowing.  She did previously have fairly severe numbness of her arm as well that resolved.  She will return to clinic after these additional images are obtained.     Since our last visit, she has undergone cervical and thoracic MRI. These were negative for any significant central narrowing. Was noted to have moderate foraminal at bilateral C5-6 and left C6-7. This may be responsible for past numbness that resolved in her arm. She continues to have back and buttock pain. She will continue with PT. We also discussed proceeding with bilateral lumbar 5-sacral 1 TFESI. She will return to clinic after the spinal injections.     Location  of Dr Das: [facility] [place], [place]  Location of patient: [place]  Length of Visit: 6:55 minutes    Vanita Das MD       Again, thank you for allowing me to participate in the care of your patient.        Sincerely,        Vanita Das MD

## 2024-05-21 NOTE — PROGRESS NOTES
Nilsa Vazquez is a 53 year old year old patient who comes in today for a Blood Pressure check because of medication change hydrochlorothiazide 25 mg daily on 5/6/24. BMP done today is pending.  Vital Signs as repeated by RN /76 P 88.  Patient is taking medication as prescribed  Patient is tolerating medications well.  Patient is monitoring Blood Pressure at home.  Average readings if yes are 120/80  Current complaints: none  Disposition:  BP readings routed to Dr. Claudia Mitchell to review/advise, patient to continue with the same medication, check BP at least weekly notifying care team of readings consistently > 140/90 and patient reminded to call as needed.    Julie Behrendt RN

## 2024-05-22 ENCOUNTER — VIRTUAL VISIT (OUTPATIENT)
Dept: NEUROLOGY | Facility: CLINIC | Age: 54
End: 2024-05-22
Payer: COMMERCIAL

## 2024-05-22 DIAGNOSIS — G43.809 VESTIBULAR MIGRAINE: ICD-10-CM

## 2024-05-22 DIAGNOSIS — G43.709 CHRONIC MIGRAINE WITHOUT AURA WITHOUT STATUS MIGRAINOSUS, NOT INTRACTABLE: ICD-10-CM

## 2024-05-22 PROCEDURE — 99213 OFFICE O/P EST LOW 20 MIN: CPT | Mod: 95 | Performed by: PSYCHIATRY & NEUROLOGY

## 2024-05-22 PROCEDURE — G2211 COMPLEX E/M VISIT ADD ON: HCPCS | Mod: 95 | Performed by: PSYCHIATRY & NEUROLOGY

## 2024-05-22 RX ORDER — TOPIRAMATE 25 MG/1
75 TABLET, FILM COATED ORAL EVERY EVENING
Qty: 90 TABLET | Refills: 11 | Status: SHIPPED | OUTPATIENT
Start: 2024-05-22

## 2024-05-22 RX ORDER — RIZATRIPTAN BENZOATE 5 MG/1
5 TABLET ORAL
Qty: 18 TABLET | Refills: 11 | Status: SHIPPED | OUTPATIENT
Start: 2024-05-22

## 2024-05-22 RX ORDER — PROCHLORPERAZINE MALEATE 10 MG
10 TABLET ORAL EVERY 6 HOURS PRN
Qty: 30 TABLET | Refills: 3 | Status: SHIPPED | OUTPATIENT
Start: 2024-05-22

## 2024-05-22 NOTE — PROGRESS NOTES
[facility]    Headache Neurology Progress Note  May 22, 2024    Subjective:    Nilsa Vazquez returns for follow up of chronic migraine.     Today, she reports topiramate 75 mg PM is helpful. She reports carbonated beverages taste different now; no other side effects.    Headaches are occurring about 6-8 days per month. No severe headaches.     She takes Excedrin or ibuprofen, which is helpful. She has not needed rizatriptan or prochlorperazine.    She developed numbness and shooting, painful feeling in feet. She was evaluated by Dr. Sears. She tried gabapentin 600 mg TID. She is planning for injections, possible surgery.    Objective:    Vitals: There were no vitals taken for this visit.  General: Cooperative, NAD  Neurologic:  Mental Status: Fully alert, attentive and oriented. Speech clear and fluent.   Cranial Nerves: Facial movements asymmetric, weakness on left side of face.   Motor: No abnormal movements.      Pertinent Investigations:          8/21/2022     9:45 PM 5/21/2023     4:48 PM 5/17/2024     3:57 PM   HIT-6   When you have headaches, how often is the pain severe 10 8 10   How often do headaches limit your ability to do usual daily activities including household work, work, school, or social activities? 8 8 10   When you have a headache, how often do you wish you could lie down? 10 8 10   In the past 4 weeks, how often have you felt too tired to do work or daily activities because of your headaches 8 8 8   In the past 4 weeks, how often have you felt fed up or irritated because of your headaches 8 8 6   In the past 4 weeks, how often did headaches limit your ability to concentrate on work or daily activities 8 8 8   HIT-6 Total Score 52 48 52           8/24/2022     1:38 PM 5/21/2023     4:51 PM 5/17/2024     4:01 PM   MIDAS - in the past three months:   On how many days did you miss work or school because of your headaches? 2 2 0   How many days was your  productivity at work or school reduced by half or more because of your headaches? 0 3 1   On how many days did you not do household work because of your headaches? 2 3 2   How many days was your productivity in household work reduced by half or more because of your headaches? 0 3 2   On how many days did you miss family, social, or leisure activities because of your headaches? 8 3 0   On how many days did you have a headache?  6 10   On a scale of 0-10, on average how painful were these headaches? 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  She also had 8 months of episodic vertigo, also associated with photophobia, photophobia, as well as nausea.  Symptoms are well managed with topiramate 75 mg nightly. We discussed continuing topiramate versus taper.       Going forward, she will continue symptomatic treatment for chronic migraine and possible vestibular migraine.   -For acute treatment of mild headache, I recommend Excedrin as needed, not to exceed more than 9 days/month to avoid medication overuse.  - For acute treatment of headache not responsive to Excedrin, I recommend rizatriptan 5 mg taken at the onset of headache, with a repeat dose in 2 hours if needed.  This should not exceed more than 9 days/month to avoid medication overuse.     Her symptom frequency and severity remain improved.   -I recommend she continue topiramate 75 mg nightly. When she is ready, she can start tapering, decreasing by 25 mg every 1-2 weeks until off. Ok to resume previous dose if needed.    -Alternatives in the future could include antihypertensive such as beta-blockers or calcium channel  blockers if okay with her primary care physician in combination with her other antihypertensives, amitriptyline, botulinum toxin injections, or a CGRP monoclonal antibody.    The longitudinal plan of care for Nayeli was addressed during this visit. Due to the added complexity in care, I will continue to support Nayeli in the subsequent management of this condition(s) and with the ongoing continuity of care of this condition(s). I will see her back in 1 year, sooner if needed.    Carol Ann Lai MD  Neurology

## 2024-05-22 NOTE — LETTER
5/22/2024       RE: Nilsa Vazquez  [address] [facility] [address]       Dear Colleague,    Thank you for referring your patient, Nilsa Vazquez, to the [facility] [place] at [facility]. Please see a copy of my visit note below.    [facility]    Headache Neurology Progress Note  May 22, 2024    Subjective:    Nilsa Vazquez returns for follow up of chronic migraine.     Today, she reports topiramate 75 mg PM is helpful. She reports carbonated beverages taste different now; no other side effects.    Headaches are occurring about 6-8 days per month. No severe headaches.     She takes Excedrin or ibuprofen, which is helpful. She has not needed rizatriptan or prochlorperazine.    She developed numbness and shooting, painful feeling in feet. She was evaluated by Dr. Sears. She tried gabapentin 600 mg TID. She is planning for injections, possible surgery.    Objective:    Vitals: There were no vitals taken for this visit.  General: Cooperative, NAD  Neurologic:  Mental Status: Fully alert, attentive and oriented. Speech clear and fluent.   Cranial Nerves: Facial movements asymmetric, weakness on left side of face.   Motor: No abnormal movements.      Pertinent Investigations:          8/21/2022     9:45 PM 5/21/2023     4:48 PM 5/17/2024     3:57 PM   HIT-6   When you have headaches, how often is the pain severe 10 8 10   How often do headaches limit your ability to do usual daily activities including household work, work, school, or social activities? 8 8 10   When you have a headache, how often do you wish you could lie down? 10 8 10   In the past 4 weeks, how often have you felt too tired to do work or daily activities because of your headaches 8 8 8   In the past 4 weeks, how often have you felt fed up or irritated because of your headaches 8 8 6   In the past 4 weeks, how often did  headaches limit your ability to concentrate on work or daily activities 8 8 8   HIT-6 Total Score 52 48 52           8/24/2022     1:38 PM 5/21/2023     4:51 PM 5/17/2024     4:01 PM   MIDAS - in the past three months:   On how many days did you miss work or school because of your headaches? 2 2 0   How many days was your productivity at work or school reduced by half or more because of your headaches? 0 3 1   On how many days did you not do household work because of your headaches? 2 3 2   How many days was your productivity in household work reduced by half or more because of your headaches? 0 3 2   On how many days did you miss family, social, or leisure activities because of your headaches? 8 3 0   On how many days did you have a headache?  6 10   On a scale of 0-10, on average how painful were these headaches? 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  She also had 8 months of episodic vertigo, also associated with photophobia, photophobia, as well as nausea.  Symptoms are well managed with topiramate 75 mg nightly. We discussed continuing topiramate versus taper.       Going forward, she will continue symptomatic treatment for chronic migraine and possible vestibular migraine.   -For acute treatment of mild headache, I recommend Excedrin as needed, not to exceed more than 9 days/month to avoid medication overuse.  - For acute treatment of headache not responsive to Excedrin, I recommend rizatriptan 5 mg taken at the onset of headache, with a repeat dose in 2 hours if needed.  This should not exceed more than 9 days/month to avoid medication overuse.     Her symptom  frequency and severity remain improved.   -I recommend she continue topiramate 75 mg nightly. When she is ready, she can start tapering, decreasing by 25 mg every 1-2 weeks until off. Ok to resume previous dose if needed.    -Alternatives in the future could include antihypertensive such as beta-blockers or calcium channel blockers if okay with her primary care physician in combination with her other antihypertensives, amitriptyline, botulinum toxin injections, or a CGRP monoclonal antibody.    The longitudinal plan of care for Nayeli was addressed during this visit. Due to the added complexity in care, I will continue to support Nayeli in the subsequent management of this condition(s) and with the ongoing continuity of care of this condition(s). I will see her back in 1 year, sooner if needed.          Again, thank you for allowing me to participate in the care of your patient.      Sincerely,    Carol Ann Lai MD

## 2024-05-22 NOTE — PROGRESS NOTES
Virtual Visit Details    Type of service:  Video Visit     Originating Location (pt. Location): Home    Distant Location (provider location):  Off-site  Platform used for Video Visit: [employer]

## 2024-05-22 NOTE — NURSING NOTE
Is the patient currently in the state of [place]? YES    Visit mode:VIDEO    If the visit is dropped, the patient can be reconnected by: TELEPHONE VISIT: Phone number:   Telephone Information:   Mobile [phone]       Will anyone else be joining the visit? NO  (If patient encounters technical issues they should call [phone])    How would you like to obtain your AVS? MyChart    Are changes needed to the allergy or medication list? Pt stated no med changes    Are refills needed on medications prescribed by this physician? NO    Reason for visit: Video Visit (Follow-up )    Coby GONZALEZ

## 2024-06-14 ENCOUNTER — RADIOLOGY INJECTION OFFICE VISIT (OUTPATIENT)
Dept: PHYSICAL MEDICINE AND REHAB | Facility: CLINIC | Age: 54
End: 2024-06-14
Attending: SURGERY
Payer: COMMERCIAL

## 2024-06-14 VITALS
TEMPERATURE: 97.9 F | OXYGEN SATURATION: 98 % | RESPIRATION RATE: 18 BRPM | SYSTOLIC BLOOD PRESSURE: 152 MMHG | HEART RATE: 80 BPM | DIASTOLIC BLOOD PRESSURE: 82 MMHG

## 2024-06-14 DIAGNOSIS — M54.16 LUMBAR RADICULOPATHY: ICD-10-CM

## 2024-06-14 PROCEDURE — 64483 NJX AA&/STRD TFRM EPI L/S 1: CPT | Mod: XS | Performed by: PAIN MEDICINE

## 2024-06-14 RX ORDER — DEXAMETHASONE SODIUM PHOSPHATE 10 MG/ML
INJECTION, SOLUTION INTRAMUSCULAR; INTRAVENOUS
Status: COMPLETED | OUTPATIENT
Start: 2024-06-14 | End: 2024-06-14

## 2024-06-14 RX ORDER — LIDOCAINE HYDROCHLORIDE 10 MG/ML
INJECTION, SOLUTION EPIDURAL; INFILTRATION; INTRACAUDAL; PERINEURAL
Status: COMPLETED | OUTPATIENT
Start: 2024-06-14 | End: 2024-06-14

## 2024-06-14 RX ADMIN — DEXAMETHASONE SODIUM PHOSPHATE 20 MG: 10 INJECTION, SOLUTION INTRAMUSCULAR; INTRAVENOUS at 13:23

## 2024-06-14 RX ADMIN — LIDOCAINE HYDROCHLORIDE 4 ML: 10 INJECTION, SOLUTION EPIDURAL; INFILTRATION; INTRACAUDAL; PERINEURAL at 13:22

## 2024-06-14 ASSESSMENT — PAIN SCALES - GENERAL
PAINLEVEL: NO PAIN (0)
PAINLEVEL: SEVERE PAIN (6)

## 2024-06-14 NOTE — PATIENT INSTRUCTIONS
DISCHARGE INSTRUCTIONS    During office hours (8:00 a.m.- 4:00 p.m.) questions or concerns may be answered  by calling [facility] Navigation Nurses at  [phone].  Messages received after hours will be returned the following business day.      In the case of an emergency, please dial 911 or seek assistance at the nearest Emergency Room/Urgent Care facility.     All Patients:    You may experience an increase in your symptoms for the first 2 days (It may take anywhere between 2 days- 2 weeks for the steroid to have maximum effect).    You may use ice on the injection site, as frequently as 20 minutes each hour if needed.    You may take your pain medicine.    You may continue taking your regular medication after your injection. If you have had a Medial Branch Block you may resume pain medication once your pain diary is completed.    You may shower. No swimming, tub bath or hot tub for 48 hours.  You may remove your bandaid/bandage as soon as you are home.    You may resume light activities, as tolerated.    Resume your usual diet as tolerated.    It is strongly advised that you do not drive for 1-3 hours post injection.    If you have had oral sedation:  Do not drive for 8 hours post injection.      If you have had IV sedation:  Do not drive for 24 hours post injection.  Do not operate hazardous machinery or make important personal/business decisions for 24 hours.      POSSIBLE STEROID SIDE EFFECTS (If steroid/cortisone was used for your procedure)    -If you experience these symptoms, it should only last for a short period    Swelling of the legs              Skin redness (flushing)     Mouth (oral) irritation   Blood sugar (glucose) levels            Sweats                    Mood changes  Headache  Sleeplessness  Weakened immune system for up to 14 days, which could increase the risk of jostin the COVID-19 virus and/or experiencing more severe symptoms of the disease, if exposed.  Decreased  effectiveness of the flu vaccine if given within 2 weeks of the steroid.         POSSIBLE PROCEDURE SIDE EFFECTS  -Call the Spine Center if you are concerned  Increased Pain           Increased numbness/tingling      Nausea/Vomiting          Bruising/bleeding at site      Redness or swelling                                              Difficulty walking      Weakness           Fever greater than 100.5    *In the event of a severe headache after an epidural steroid injection that is relieved by lying down, please call the [facility] to speak with a clinical staff member*

## 2024-07-02 ENCOUNTER — OFFICE VISIT (OUTPATIENT)
Dept: NEUROSURGERY | Facility: CLINIC | Age: 54
End: 2024-07-02
Payer: COMMERCIAL

## 2024-07-02 VITALS — OXYGEN SATURATION: 97 % | HEART RATE: 101 BPM | DIASTOLIC BLOOD PRESSURE: 76 MMHG | SYSTOLIC BLOOD PRESSURE: 191 MMHG

## 2024-07-02 DIAGNOSIS — M54.16 LUMBAR RADICULOPATHY: Primary | ICD-10-CM

## 2024-07-02 DIAGNOSIS — M43.16 SPONDYLOLISTHESIS OF LUMBAR REGION: ICD-10-CM

## 2024-07-02 PROCEDURE — 99213 OFFICE O/P EST LOW 20 MIN: CPT | Performed by: SURGERY

## 2024-07-02 RX ORDER — METHYLPREDNISOLONE 4 MG
TABLET, DOSE PACK ORAL
Qty: 21 TABLET | Refills: 0 | Status: SHIPPED | OUTPATIENT
Start: 2024-07-02 | End: 2024-09-03

## 2024-07-02 RX ORDER — GABAPENTIN 300 MG/1
900 CAPSULE ORAL 3 TIMES DAILY
Qty: 270 CAPSULE | Refills: 0 | Status: SHIPPED | OUTPATIENT
Start: 2024-07-02

## 2024-07-02 ASSESSMENT — PAIN SCALES - GENERAL: PAINLEVEL: EXTREME PAIN (8)

## 2024-07-02 NOTE — LETTER
7/2/2024      Nilsa Vazquez  [address] [place] [id number]      Dear Colleague,    Thank you for referring your patient, Nilsa Vazquez, to the [facility]. Please see a copy of my visit note below.    NEUROSURGERY FOLLOW UP  NOTE    Nilsa Vazquez comes today in follow-up. Patient is a 53 year-old female who presents with low back and bilateral buttock pain as well as toe numbness bilaterally. Also intermittent diffuse numbness in her legs. She showed a mild S1 radiculopathy right greater than left. MRI of her lumbar spine shows very little canal stenosis or neuro foraminal narrowing except at lumbar 5-sacral 1 she has mild right lateral recess stenosis with moderate to severe left lateral recess stenosis and mild to moderate left and moderate right foraminal narrowing at lumbar 5-sacral 1. Her first x-ray showed a grade 1 anterolisthesis of 4 mm with possible segmental instability however there was question of oblique notes of the images. Her films were repeated which shows a 4 mm anterolisthesis lumbar 5-sacral 1 does not appear to significantly change with flexion extension. She returns after SNRI of lumbar 5-sacral 1.     Bilateral lumbar 5-sacral 1 TFESI on 6/14/24 1-2 days of relief only. Pain worsened in her legs since than. Electricity charging symptoms worsened in distal leg now wrapping around lateral ankle to her foot. No longer can get comfortable.       PHYSICAL EXAM:   Constitutional: BP (!) 191/76   Pulse 101   SpO2 97%      Mental Status: A & O in no acute distress.  Affect is appropriate.  Speech is fluent.  Recent and remote memory are intact.  Attention span and concentration are normal.     Motor:  Normal bulk and tone all muscle groups of upper and lower extremities.     IMAGING:   I personally reviewed all radiographic images        CONSULTATION ASSESSMENT AND PLAN:    Given relief after TFESI although short lived suggests pain is related to  stenosis at lumbar 5-sacral 1. Patient has foraminal narrowing bilaterally as well as a lateral recess stenosis at left lumbar 5-sacral 1 with contact of S1 nerve. Given this may be difficult to fully decompress the nerves without a larger facetectomy which may not be desirable with the anterolisthesis at that level particularly since there were varying reads of potential movement on xray. Due to this would recommend weight loss prior to surgery to reduce risks associated with elective spine surgery. A comprehensive weight loss management consult was placed.     Vanita Das MD      CC:     Claudia Mitchell  [address] [place] [id number]      Again, thank you for allowing me to participate in the care of your patient.        Sincerely,        Vanita Das MD

## 2024-07-02 NOTE — PATIENT INSTRUCTIONS
Recommend transforaminal lumbar interbody fusion at lumbar-5 to sacral-1    Comprehensive weight management consult placed    Increase gabapentin to 900 mg three times daily    Medrol dosepak ordered

## 2024-07-02 NOTE — PROGRESS NOTES
NEUROSURGERY FOLLOW UP  NOTE    Nilsa Vazquez comes today in follow-up. Patient is a 53 year-old female who presents with low back and bilateral buttock pain as well as toe numbness bilaterally. Also intermittent diffuse numbness in her legs. She showed a mild S1 radiculopathy right greater than left. MRI of her lumbar spine shows very little canal stenosis or neuro foraminal narrowing except at lumbar 5-sacral 1 she has mild right lateral recess stenosis with moderate to severe left lateral recess stenosis and mild to moderate left and moderate right foraminal narrowing at lumbar 5-sacral 1. Her first x-ray showed a grade 1 anterolisthesis of 4 mm with possible segmental instability however there was question of oblique notes of the images. Her films were repeated which shows a 4 mm anterolisthesis lumbar 5-sacral 1 does not appear to significantly change with flexion extension. She returns after SNRI of lumbar 5-sacral 1.     Bilateral lumbar 5-sacral 1 TFESI on 6/14/24 1-2 days of relief only. Pain worsened in her legs since than. Electricity charging symptoms worsened in distal leg now wrapping around lateral ankle to her foot. No longer can get comfortable.       PHYSICAL EXAM:   Constitutional: BP (!) 191/76   Pulse 101   SpO2 97%      Mental Status: A & O in no acute distress.  Affect is appropriate.  Speech is fluent.  Recent and remote memory are intact.  Attention span and concentration are normal.     Motor:  Normal bulk and tone all muscle groups of upper and lower extremities.     IMAGING:   I personally reviewed all radiographic images        CONSULTATION ASSESSMENT AND PLAN:    Given relief after TFESI although short lived suggests pain is related to stenosis at lumbar 5-sacral 1. Patient has foraminal narrowing bilaterally as well as a lateral recess stenosis at left lumbar 5-sacral 1 with contact of S1 nerve. Given this may be difficult to fully decompress the nerves without a larger  facetectomy which may not be desirable with the anterolisthesis at that level particularly since there were varying reads of potential movement on xray. Due to this would recommend weight loss prior to surgery to reduce risks associated with elective spine surgery. A comprehensive weight loss management consult was placed.     Vanita Das MD      CC:     Claudia Mitchell  [address] [place] [phone]

## 2024-07-09 NOTE — PROGRESS NOTES
05/14/24 0500   Appointment Info   Signing clinician's name / credentials Wanda Conner, PT, DPT   Visits Used 6   Medical Diagnosis Lumbar radiculopathy   PT Tx Diagnosis LBP   Progress Note/Certification   Onset of illness/injury or Date of Surgery 04/10/23   Therapy Frequency 1x/week   Predicted Duration 8 weeks   Progress Note Due Date 06/05/24   GOALS   PT Goals 2;3;4   PT Goal 1   Goal Identifier Sitting   Goal Description Pt will be able to sit at desk with less than 1/10 foot pain   Goal Progress not met   Target Date 05/08/24   PT Goal 2   Goal Identifier walking   Goal Description Pt will be able to walk 30 min w less than 1/10 foot pain   Target Date 06/05/24   PT Goal 3   Goal Identifier HEP   Goal Description Pt will be compliant and competent in HEP to allow them to achieve maximal strength and reduce pain mobility   Target Date 06/05/24   Subjective Report   Subjective Report Pt relates she does not do well when at work. Only difference is the walking. Pt relates B hip pain.   Objective Measures   Objective Measures Objective Measure 1;Objective Measure 2;Objective Measure 3;Objective Measure 4;Objective Measure 5   Objective Measure 1   Objective Measure lumbar ROM   Objective Measure 2   Objective Measure Joint Mobility   Objective Measure 3   Objective Measure Palpation   Objective Measure 4   Objective Measure Vitals   Objective Measure 5   Objective Measure Ambulation   Details very antaglic, limp on R   Treatment Interventions (PT)   Interventions Therapeutic Procedure/Exercise;Manual Therapy   Manual Therapy   Manual Therapy: Mobilization, MFR, MLD, friction massage minutes (14856) 24   Manual Therapy Manual Therapy 2;Manual Therapy 3;Manual Therapy 4;Manual Therapy 5;Manual Therapy 6;Manual Therapy 7   Manual Therapy 3 DL traction in hooklying with belt - makes back feel better but no change in neuropathy   Skilled Intervention improve mobiilty and reduce pain   Patient Response/Progress  reduced symptoms and improved ambulation   Eval/Assessments   Assessments   (Pt was put on BP meds. Pt has increased pain and radicualr symptsom thus focused on manual traction. Pt has improved symptosm with it today. Pending MRI results continue with traction and TE as pt is able to tolerate)   Education   Learner/Method Patient   Plan   Home program [email or web address]   Plan for next session improve core strength, clams, hold chart pending MRI?   Total Session Time   Timed Code Treatment Minutes 24   Total Treatment Time (sum of timed and untimed services) 24           DISCHARGE  Reason for Discharge: Patient has failed to schedule further appointments.    Equipment Issued: NA    Discharge Plan: Patient to continue home program.    Referring Provider:  Severo Sears

## 2024-07-25 ENCOUNTER — VIRTUAL VISIT (OUTPATIENT)
Dept: NEUROLOGY | Facility: CLINIC | Age: 54
End: 2024-07-25
Payer: COMMERCIAL

## 2024-07-25 VITALS — WEIGHT: 253 LBS | BODY MASS INDEX: 40.66 KG/M2 | HEIGHT: 66 IN

## 2024-07-25 DIAGNOSIS — M54.16 LUMBAR RADICULOPATHY: Primary | ICD-10-CM

## 2024-07-25 DIAGNOSIS — G51.32 HEMIFACIAL SPASM OF LEFT SIDE OF FACE: ICD-10-CM

## 2024-07-25 PROCEDURE — G2211 COMPLEX E/M VISIT ADD ON: HCPCS | Performed by: PSYCHIATRY & NEUROLOGY

## 2024-07-25 PROCEDURE — 99214 OFFICE O/P EST MOD 30 MIN: CPT | Mod: 95 | Performed by: PSYCHIATRY & NEUROLOGY

## 2024-07-25 RX ORDER — DULOXETIN HYDROCHLORIDE 30 MG/1
CAPSULE, DELAYED RELEASE ORAL
Qty: 194 CAPSULE | Refills: 0 | Status: SHIPPED | OUTPATIENT
Start: 2024-07-25 | End: 2024-11-06

## 2024-07-25 ASSESSMENT — PAIN SCALES - GENERAL: PAINLEVEL: SEVERE PAIN (6)

## 2024-07-25 NOTE — NURSING NOTE
Current patient location: [address] [place] [id number]    Is the patient currently in the state of [place]? YES    Visit mode:VIDEO    If the visit is dropped, the patient can be reconnected by: VIDEO VISIT: Text to cell phone:   Telephone Information:   Mobile [phone]       Will anyone else be joining the visit? NO  (If patient encounters technical issues they should call [phone] :[phone])    How would you like to obtain your AVS? MyChart    Are changes needed to the allergy or medication list? Pt stated no changes to allergies and Pt stated no med changes    Are refills needed on medications prescribed by this physician? NO    Reason for visit: [place] WEBERF

## 2024-07-25 NOTE — LETTER
7/25/2024       RE: Nilsa Vazquez  [address] [facility] [id number]       Dear Colleague,    Thank you for referring your patient, Nilsa Vazquez, to the [facility] [place] at [facility]. Please see a copy of my visit note below.    [facility] Neurology Follow Up Visit    Nilsa Vazquez MRN# [id number]   Age: 53 year old YOB: 1970     Brief history of symptoms: The patient was initially seen in neurologic consultation on 10/19/2023 and most recently 4/25/2024 for evaluation of neuropathy. Please see the comprehensive neurologic consultation notes from those dates in the Epic records for details.     Overall impressions and w/up are as follows:  - Serum studies did reveal elevated CRP, ESR as well as monoclonal austen elevations. Rheumatology and hematology referrals were made.  - EMG on 3/11/2024 showed b/l (R>L) S1 radiculopathy that were active.  - At our last visit, the patient was to obtain an MRI lumbar spine, and see PT for S1 radiculopathy stretching/treatment              - MRI Lumbar spine on 4/6/2024 showed L5-S1 concentric disc bulge with moderate to severe left lateral recess stenosis (contact/compression of descending L-S1 root) as well as a moderate left and right neural foraminal stenosis leading to possible R-L5 nerve root compression at the same level.    At our last visit she was recommended to trial ANGIE b/l at S1 and see a neurosurgeon given her poor response/improvement with PT.    Interval history:   - Most recently seen with NSGY 7/2/2024 reporting 1-2 days relief with b/l L5-S1 TFESI (6/14/2024) and worsened pain in both legs.  She was recommended to have weight loss management prior to consideration of decompression.    Today, the patient confirms that for a few days she had some resolution of her back pain (lower back and buttock).  No improvement in her feet was noted.  She still has continued  "burning in her feet that is transient. She hasn't tried any other medications than gabapentin for foot related symptoms. She is not currently taking Topamax for migraine.      Physical Exam:   Vitals: Ht 1.676 m (5' 6\")   Wt 114.8 kg (253 lb)   BMI 40.84 kg/m     General: Seated comfortably in no acute distress.  Neurologic:     Mental Status: Fully alert, attentive and oriented. Speech clear and fluent, no paraphasic errors.     Cranial Nerves: EOMI with normal smooth pursuit. Facial movements asymmetric (left facial droop, left hemifacial spasm). Hearing not formally tested but intact to conversation.  No dysarthria.     Motor: left hemifacial spasm noted. No other atypical movements seen on video.      Coordination: Finger-nose-finger without dysmetria.     Gait: Normal, steady casual gait.         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  We went over the side-effects of the medication and titration, which she was agreeable to.  We also spoke of her left hemifacial spasm following prior surgery, and that she may benefit from having botox to reduce their impact on her day to day life.  For her possible neurosurgery in the future, she is to have weight loss before it is considered further, and to help her with this I would think pool therapy may be helpful.     Plan:  Duloxetine 30 mg at bedtime for 14 days, then   - 60 mg at bedtime  Pool PT  PM&R referral for left hemifacial spasms      Follow up in Neurology clinic in 3-4 months (in-person) or earlier as needed should new concerns arise.    Total time today (32 min) in this patient encounter was spent on pre-charting, counseling and/or coordination of care. The longitudinal plan of care for the " diagnosis(es)/condition(s) as documented were addressed during this visit. Due to the added complexity in care, I will continue to support Nayeli in the subsequent management and with ongoing continuity of care.        Again, thank you for allowing me to participate in the care of your patient.      Sincerely,    Severo Sears, DO

## 2024-07-25 NOTE — PROGRESS NOTES
"[facility] Neurology Follow Up Visit    Nilsa Vazquez MRN# [id number]   Age: 53 year old YOB: 1970     Brief history of symptoms: The patient was initially seen in neurologic consultation on 10/19/2023 and most recently 4/25/2024 for evaluation of neuropathy. Please see the comprehensive neurologic consultation notes from those dates in the Epic records for details.     Overall impressions and w/up are as follows:  - Serum studies did reveal elevated CRP, ESR as well as monoclonal austen elevations. Rheumatology and hematology referrals were made.  - EMG on 3/11/2024 showed b/l (R>L) S1 radiculopathy that were active.  - At our last visit, the patient was to obtain an MRI lumbar spine, and see PT for S1 radiculopathy stretching/treatment              - MRI Lumbar spine on 4/6/2024 showed L5-S1 concentric disc bulge with moderate to severe left lateral recess stenosis (contact/compression of descending L-S1 root) as well as a moderate left and right neural foraminal stenosis leading to possible R-L5 nerve root compression at the same level.    At our last visit she was recommended to trial ANGIE b/l at S1 and see a neurosurgeon given her poor response/improvement with PT.    Interval history:   - Most recently seen with NSGY 7/2/2024 reporting 1-2 days relief with b/l L5-S1 TFESI (6/14/2024) and worsened pain in both legs.  She was recommended to have weight loss management prior to consideration of decompression.    Today, the patient confirms that for a few days she had some resolution of her back pain (lower back and buttock).  No improvement in her feet was noted.  She still has continued burning in her feet that is transient. She hasn't tried any other medications than gabapentin for foot related symptoms. She is not currently taking Topamax for migraine.      Physical Exam:   Vitals: Ht 1.676 m (5' 6\")   Wt 114.8 kg (253 lb)   BMI 40.84 kg/m     General: Seated comfortably in no acute " distress.  Neurologic:     Mental Status: Fully alert, attentive and oriented. Speech clear and fluent, no paraphasic errors.     Cranial Nerves: EOMI with normal smooth pursuit. Facial movements asymmetric (left facial droop, left hemifacial spasm). Hearing not formally tested but intact to conversation.  No dysarthria.     Motor: left hemifacial spasm noted. No other atypical movements seen on video.      Coordination: Finger-nose-finger without dysmetria.     Gait: Normal, steady casual gait.         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  We went over the side-effects of the medication and titration, which she was agreeable to.  We also spoke of her left hemifacial spasm following prior surgery, and that she may benefit from having botox to reduce their impact on her day to day life.  For her possible neurosurgery in the future, she is to have weight loss before it is considered further, and to help her with this I would think pool therapy may be helpful.     Plan:  Duloxetine 30 mg at bedtime for 14 days, then   - 60 mg at bedtime  Pool PT  PM&R referral for left hemifacial spasms      Follow up in Neurology clinic in 3-4 months (in-person) or earlier as needed should new concerns arise.    HELGA Sears D.O.   of Neurology    Total time today (32 min) in this patient encounter was spent on pre-charting, counseling and/or coordination of care. The longitudinal plan of care for the diagnosis(es)/condition(s) as documented were addressed during this visit. Due to the added complexity in care, I will continue to support Nayeli in the subsequent management and with ongoing continuity of care.

## 2024-07-29 DIAGNOSIS — I10 ESSENTIAL HYPERTENSION, BENIGN: ICD-10-CM

## 2024-07-30 ENCOUNTER — THERAPY VISIT (OUTPATIENT)
Dept: PHYSICAL THERAPY | Facility: CLINIC | Age: 54
End: 2024-07-30
Attending: PSYCHIATRY & NEUROLOGY
Payer: COMMERCIAL

## 2024-07-30 DIAGNOSIS — M54.16 LUMBAR RADICULOPATHY: ICD-10-CM

## 2024-07-30 PROCEDURE — 97110 THERAPEUTIC EXERCISES: CPT | Mod: GP | Performed by: PHYSICAL THERAPIST

## 2024-07-30 PROCEDURE — 97161 PT EVAL LOW COMPLEX 20 MIN: CPT | Mod: GP | Performed by: PHYSICAL THERAPIST

## 2024-07-30 RX ORDER — HYDROCHLOROTHIAZIDE 25 MG/1
25 TABLET ORAL DAILY
Qty: 90 TABLET | Refills: 0 | Status: SHIPPED | OUTPATIENT
Start: 2024-07-30 | End: 2024-09-03

## 2024-07-30 NOTE — PROGRESS NOTES
PHYSICAL THERAPY EVALUATION  Type of Visit: Evaluation       Fall Risk Screen:  Fall screen completed by: PT  Have you fallen 2 or more times in the past year?: No  Have you fallen and had an injury in the past year?: No  Is patient a fall risk?: No    Subjective   Pt reports ongoing pain for a year and a half with symptoms into B feet with numbness/tingling into lateral foot.  Pt also has low back and B buttock pain with symptoms shooting into L anterior thigh.  Pain is worse with standing/walking, better with lying down.  Pt reports toe symptoms are constant, the rest of the symptoms fluctuate.  Pt reports increased mm tension in buttocks with increased walking.          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 Regular PT but just pulled on my spine and made issues worse      Living Environment  Social support: With a significant other or spouse   Type of home: House   Stairs to enter the home: Yes 1 Is there a railing: Yes     Ramp: No   Stairs inside the home: Yes 8 Is there a railing: Yes     Help at home: None  Equipment owned:       Employment: Yes Accounts receiable  Hobbies/Interests:      Patient goals for therapy: Walking for any distance.    Pain assessment: Pain present     Objective   LUMBAR SPINE EVALUATION  PAIN: Pain Level at Rest: 2/10  Pain Level with Use: 8/10  Pain Location: lumbar spine, hip, knee, ankle, and foot   Pain Quality: Aching, Burning, Exhausting, and Shooting  Pain Frequency: constant  Pain is Worst: daytime  Pain is Exacerbated By: standing, walking.    Pain is Relieved By: rest  and use  Pain Progression: Worsened  INTEGUMENTARY (edema, incisions): WNL  POSTURE: Sitting Posture: Rounded shoulders, Forward head  GAIT:   Weightbearing Status: WBAT  Assistive Device(s): None  Gait Deviations: Antalgic  Base of support increased  BALANCE/PROPRIOCEPTION: WNL  WEIGHTBEARING ALIGNMENT: WNL  NON-WEIGHTBEARING ALIGNMENT: WNL   ROM:  WNL, pain with extension produced.    PELVIC/SI SCREEN: WNL  STRENGTH: WNL  MYOTOMES: WNL  DTR S: WNL  CORD SIGNS: WNL  DERMATOMES: WNL  NEURAL TENSION: Lumbar WNL  FLEXIBILITY:  Tight HS.   LUMBAR/HIP Special Tests: WNL   PELVIS/SI SPECIAL TESTS: WNL  FUNCTIONAL TESTS: Double Leg Squat: Anterior knee translation, Knee valgus, Hip internal rotation, and Improper use of glutes/hips  PALPATION:  Tender through lumbar spine.       Assessment & Plan   CLINICAL IMPRESSIONS  Medical Diagnosis: Lumbar radiculopathy    Treatment Diagnosis: Lumbar radiculopathy   Impression/Assessment: Patient is a 53 year old female with Low back pain and radiating symptoms complaints.  The following significant findings have been identified: Pain, Decreased ROM/flexibility, Decreased joint mobility, Decreased strength, Impaired balance, Impaired gait, Impaired muscle performance, and Decreased activity tolerance. These impairments interfere with their ability to perform work tasks, recreational activities, and household mobility as compared to previous level of function.     Clinical Decision Making (Complexity):  Clinical Presentation: Stable/Uncomplicated  Clinical Presentation Rationale: based on medical and personal factors listed in PT evaluation  Clinical Decision Making (Complexity): Low complexity    PLAN OF CARE  Treatment Interventions:  Interventions: Aquatic Therapy Therapeutic exercise.    Long Term Goals     PT Goal 1  Goal Identifier: Pool HEP  Goal Description: Pt will be independent with HEP in pool in order to improve general strength and function while improving weight  management.  Target Date: 09/11/24      Frequency of Treatment: 1x/week  Duration of Treatment: 4 weeks    Recommended Referrals to Other Professionals: none.   Education Assessment:   Learner/Method: Patient  Education Comments: HEP    Risks and benefits of evaluation/treatment have been explained.   Patient/Family/caregiver agrees with Plan of Care.     Evaluation Time:     PT Eval, Low Complexity Minutes (76016): 30     Signing Clinician: Jason Louise PT

## 2024-08-01 ENCOUNTER — TELEPHONE (OUTPATIENT)
Dept: NEUROLOGY | Facility: CLINIC | Age: 54
End: 2024-08-01
Payer: COMMERCIAL

## 2024-08-01 NOTE — TELEPHONE ENCOUNTER
Left Voicemail (1st Attempt) and Sent Mychart (1st Attempt) for the patient to call back and schedule the following:    Appointment type: Return Headache  Provider: Ming  Return date: 5/22/25 or near  Specialty phone number: [phone]  Additional appointment(s) needed: NA  Additonal Notes: 1 yr follow up, shawn Morales on 8/1/2024 at 2:41 PM

## 2024-08-02 ENCOUNTER — TELEPHONE (OUTPATIENT)
Dept: NEUROLOGY | Facility: CLINIC | Age: 54
End: 2024-08-02
Payer: COMMERCIAL

## 2024-08-02 NOTE — TELEPHONE ENCOUNTER
Patient confirmed scheduled appointment:  Date: 11/1/2024  Time: 8:30 am  Visit type: Return Neurology  Provider: Renu  Location: [facility]  Testing/imaging:   Additional notes: 3 month follow up      Alma Schmitz on 8/2/2024 at 4:00 PM

## 2024-08-05 ENCOUNTER — THERAPY VISIT (OUTPATIENT)
Dept: PHYSICAL THERAPY | Facility: CLINIC | Age: 54
End: 2024-08-05
Attending: PSYCHIATRY & NEUROLOGY
Payer: COMMERCIAL

## 2024-08-05 DIAGNOSIS — M54.16 LUMBAR RADICULOPATHY: Primary | ICD-10-CM

## 2024-08-05 PROCEDURE — 97113 AQUATIC THERAPY/EXERCISES: CPT | Mod: GP | Performed by: PHYSICAL THERAPIST

## 2024-08-06 NOTE — TELEPHONE ENCOUNTER
Patient confirmed scheduled appointment:  Date: 5/28/25  Time: 3:30pm  Visit type: Return Headache  Provider: Ming  Location: [facility]  Testing/imaging:   Additional notes: 1 yr follow up    Alma Schmitz on 8/6/2024 at 10:55 AM

## 2024-08-12 ENCOUNTER — PATIENT OUTREACH (OUTPATIENT)
Dept: FAMILY MEDICINE | Facility: CLINIC | Age: 54
End: 2024-08-12
Payer: COMMERCIAL

## 2024-08-26 NOTE — PROGRESS NOTES
08/05/24 0500   Appointment Info   Signing clinician's name / credentials Jason Louise, PT, DPT, OCS   Total/Authorized Visits 2   Medical Diagnosis Lumbar radiculopathy   PT Tx Diagnosis Lumbar radiculopathy   Progress Note/Certification   Onset of illness/injury or Date of Surgery 07/30/23   Therapy Frequency 1x/week   Predicted Duration 4 weeks   Progress Note Completed Date 07/31/24   PT Goal 1   Goal Identifier Pool HEP   Goal Description Pt will be independent with HEP in pool in order to improve general strength and function while improving weight management.   Target Date 09/11/24   Treatment Interventions (PT)   Interventions Therapeutic Procedure/Exercise;Aquatic Therapy   Aquatic Therapy   Aquatic Therapy Minutes (08508) 40   Aquatic Therapy 1 - Details Patient entered and exited the pool via ladder.  Patient ambulated forward x 5 minutes, side step x 5 minutes, backwards x 5 minutes.  March 2 minutes, Hamstring curl 2 minutes, Hip abduction 2 minutes, Squat 2 minutes, Heel raises 2 minutes, Kick board push forward and down 2 minutes x each, Shoulder horizontal add/abd with dumbbells x 2 minutes, Shoulder abd/add with dumbbells x 2 minutes, Walking x 5 minutes.   Skilled Intervention Pool therapy instruction.   Patient Response/Progress Tolerated well. Mild discomfort with hip abduction   Education   Learner/Method Patient   Education Comments HEP   Plan   Home program prone lying   Updates to plan of care 1x/week   Plan for next session F/u as needed, dc after 1 month   Total Session Time   Timed Code Treatment Minutes 40   Total Treatment Time (sum of timed and untimed services) 40         DISCHARGE  Reason for Discharge: Patient has met all goals.    Equipment Issued: none    Discharge Plan: Patient to continue home program.    Referring Provider:  Severo Sears

## 2024-08-28 ENCOUNTER — MYC REFILL (OUTPATIENT)
Dept: FAMILY MEDICINE | Facility: CLINIC | Age: 54
End: 2024-08-28
Payer: COMMERCIAL

## 2024-08-28 DIAGNOSIS — G47.00 PERSISTENT DISORDER OF INITIATING OR MAINTAINING SLEEP: ICD-10-CM

## 2024-08-29 RX ORDER — ZOLPIDEM TARTRATE 5 MG/1
5 TABLET ORAL
Qty: 30 TABLET | Refills: 5 | Status: SHIPPED | OUTPATIENT
Start: 2024-08-29

## 2024-08-29 NOTE — TELEPHONE ENCOUNTER
Pending Prescriptions:                       Disp   Refills    zolpidem (AMBIEN) 5 MG tablet             30 tab*5            Sig: Take 1 tablet (5 mg) by mouth every evening as           needed for sleep.    Routing refill request to provider for review/approval because:  Drug not on the G refill protocol       Raad Toledo RN

## 2024-09-03 ENCOUNTER — OFFICE VISIT (OUTPATIENT)
Dept: FAMILY MEDICINE | Facility: CLINIC | Age: 54
End: 2024-09-03
Payer: COMMERCIAL

## 2024-09-03 VITALS
BODY MASS INDEX: 42.15 KG/M2 | TEMPERATURE: 98.9 F | SYSTOLIC BLOOD PRESSURE: 136 MMHG | HEIGHT: 65 IN | DIASTOLIC BLOOD PRESSURE: 86 MMHG | OXYGEN SATURATION: 98 % | WEIGHT: 253 LBS | RESPIRATION RATE: 20 BRPM | HEART RATE: 86 BPM

## 2024-09-03 DIAGNOSIS — I10 ESSENTIAL HYPERTENSION, BENIGN: ICD-10-CM

## 2024-09-03 DIAGNOSIS — Z00.00 ROUTINE GENERAL MEDICAL EXAMINATION AT A HEALTH CARE FACILITY: Primary | ICD-10-CM

## 2024-09-03 DIAGNOSIS — Z12.4 CERVICAL CANCER SCREENING: ICD-10-CM

## 2024-09-03 DIAGNOSIS — D44.7 GLOMUS JUGULARE TUMOR (H): ICD-10-CM

## 2024-09-03 DIAGNOSIS — E66.01 MORBID OBESITY (H): ICD-10-CM

## 2024-09-03 DIAGNOSIS — M54.16 LUMBAR RADICULOPATHY: ICD-10-CM

## 2024-09-03 LAB
ALBUMIN SERPL BCG-MCNC: 4.2 G/DL (ref 3.5–5.2)
ALP SERPL-CCNC: 142 U/L (ref 40–150)
ALT SERPL W P-5'-P-CCNC: 28 U/L (ref 0–50)
ANION GAP SERPL CALCULATED.3IONS-SCNC: 13 MMOL/L (ref 7–15)
AST SERPL W P-5'-P-CCNC: 25 U/L (ref 0–45)
BILIRUB SERPL-MCNC: 0.5 MG/DL
BUN SERPL-MCNC: 14.1 MG/DL (ref 6–20)
CALCIUM SERPL-MCNC: 9.6 MG/DL (ref 8.8–10.4)
CHLORIDE SERPL-SCNC: 100 MMOL/L (ref 98–107)
CHOLEST SERPL-MCNC: 186 MG/DL
CREAT SERPL-MCNC: 0.75 MG/DL (ref 0.51–0.95)
EGFRCR SERPLBLD CKD-EPI 2021: >90 ML/MIN/1.73M2
ERYTHROCYTE [DISTWIDTH] IN BLOOD BY AUTOMATED COUNT: 12.8 % (ref 10–15)
FASTING STATUS PATIENT QL REPORTED: YES
FASTING STATUS PATIENT QL REPORTED: YES
GLUCOSE SERPL-MCNC: 105 MG/DL (ref 70–99)
HBA1C MFR BLD: 5.5 % (ref 0–5.6)
HCO3 SERPL-SCNC: 26 MMOL/L (ref 22–29)
HCT VFR BLD AUTO: 44.8 % (ref 35–47)
HDLC SERPL-MCNC: 45 MG/DL
HGB BLD-MCNC: 15.2 G/DL (ref 11.7–15.7)
LDLC SERPL CALC-MCNC: 118 MG/DL
MCH RBC QN AUTO: 29.2 PG (ref 26.5–33)
MCHC RBC AUTO-ENTMCNC: 33.9 G/DL (ref 31.5–36.5)
MCV RBC AUTO: 86 FL (ref 78–100)
NONHDLC SERPL-MCNC: 141 MG/DL
PLATELET # BLD AUTO: 351 10E3/UL (ref 150–450)
POTASSIUM SERPL-SCNC: 3.7 MMOL/L (ref 3.4–5.3)
PROT SERPL-MCNC: 7.7 G/DL (ref 6.4–8.3)
RBC # BLD AUTO: 5.2 10E6/UL (ref 3.8–5.2)
SODIUM SERPL-SCNC: 139 MMOL/L (ref 135–145)
TRIGL SERPL-MCNC: 115 MG/DL
TSH SERPL DL<=0.005 MIU/L-ACNC: 2.59 UIU/ML (ref 0.3–4.2)
WBC # BLD AUTO: 13.6 10E3/UL (ref 4–11)

## 2024-09-03 PROCEDURE — G0145 SCR C/V CYTO,THINLAYER,RESCR: HCPCS | Performed by: FAMILY MEDICINE

## 2024-09-03 PROCEDURE — 36415 COLL VENOUS BLD VENIPUNCTURE: CPT | Performed by: FAMILY MEDICINE

## 2024-09-03 PROCEDURE — 80053 COMPREHEN METABOLIC PANEL: CPT | Performed by: FAMILY MEDICINE

## 2024-09-03 PROCEDURE — 83036 HEMOGLOBIN GLYCOSYLATED A1C: CPT | Performed by: FAMILY MEDICINE

## 2024-09-03 PROCEDURE — 99396 PREV VISIT EST AGE 40-64: CPT | Performed by: FAMILY MEDICINE

## 2024-09-03 PROCEDURE — 87624 HPV HI-RISK TYP POOLED RSLT: CPT | Performed by: FAMILY MEDICINE

## 2024-09-03 PROCEDURE — 85027 COMPLETE CBC AUTOMATED: CPT | Performed by: FAMILY MEDICINE

## 2024-09-03 PROCEDURE — 80061 LIPID PANEL: CPT | Performed by: FAMILY MEDICINE

## 2024-09-03 PROCEDURE — 99214 OFFICE O/P EST MOD 30 MIN: CPT | Mod: 25 | Performed by: FAMILY MEDICINE

## 2024-09-03 PROCEDURE — 84443 ASSAY THYROID STIM HORMONE: CPT | Performed by: FAMILY MEDICINE

## 2024-09-03 RX ORDER — CYCLOBENZAPRINE HCL 10 MG
10 TABLET ORAL 2 TIMES DAILY PRN
Qty: 60 TABLET | Refills: 5 | Status: SHIPPED | OUTPATIENT
Start: 2024-09-03

## 2024-09-03 RX ORDER — HYDROCHLOROTHIAZIDE 25 MG/1
25 TABLET ORAL DAILY
Qty: 90 TABLET | Refills: 3 | Status: SHIPPED | OUTPATIENT
Start: 2024-09-03

## 2024-09-03 RX ORDER — LOSARTAN POTASSIUM 50 MG/1
100 TABLET ORAL DAILY
Qty: 180 TABLET | Refills: 3 | Status: SHIPPED | OUTPATIENT
Start: 2024-09-03

## 2024-09-03 ASSESSMENT — PAIN SCALES - GENERAL: PAINLEVEL: SEVERE PAIN (6)

## 2024-09-03 NOTE — PATIENT INSTRUCTIONS
Patient Education   Preventive Care Advice   This is general advice given by our system to help you stay healthy. However, your care team may have specific advice just for you. Please talk to your care team about your preventive care needs.  Nutrition  Eat 5 or more servings of fruits and vegetables each day.  Try wheat bread, brown rice and whole grain pasta (instead of white bread, rice, and pasta).  Get enough calcium and vitamin D. Check the label on foods and aim for 100% of the RDA (recommended daily allowance).  Lifestyle  Exercise at least 150 minutes each week  (30 minutes a day, 5 days a week).  Do muscle strengthening activities 2 days a week. These help control your weight and prevent disease.  No smoking.  Wear sunscreen to prevent skin cancer.  Have a dental exam and cleaning every 6 months.  Yearly exams  See your health care team every year to talk about:  Any changes in your health.  Any medicines your care team has prescribed.  Preventive care, family planning, and ways to prevent chronic diseases.  Shots (vaccines)   HPV shots (up to age 26), if you've never had them before.  Hepatitis B shots (up to age 59), if you've never had them before.  COVID-19 shot: Get this shot when it's due.  Flu shot: Get a flu shot every year.  Tetanus shot: Get a tetanus shot every 10 years.  Pneumococcal, hepatitis A, and RSV shots: Ask your care team if you need these based on your risk.  Shingles shot (for age 50 and up)  General health tests  Diabetes screening:  Starting at age 35, Get screened for diabetes at least every 3 years.  If you are younger than age 35, ask your care team if you should be screened for diabetes.  Cholesterol test: At age 39, start having a cholesterol test every 5 years, or more often if advised.  Bone density scan (DEXA): At age 50, ask your care team if you should have this scan for osteoporosis (brittle bones).  Hepatitis C: Get tested at least once in your life.  STIs (sexually  transmitted infections)  Before age 24: Ask your care team if you should be screened for STIs.  After age 24: Get screened for STIs if you're at risk. You are at risk for STIs (including HIV) if:  You are sexually active with more than one person.  You don't use condoms every time.  You or a partner was diagnosed with a sexually transmitted infection.  If you are at risk for HIV, ask about PrEP medicine to prevent HIV.  Get tested for HIV at least once in your life, whether you are at risk for HIV or not.  Cancer screening tests  Cervical cancer screening: If you have a cervix, begin getting regular cervical cancer screening tests starting at age 21.  Breast cancer scan (mammogram): If you've ever had breasts, begin having regular mammograms starting at age 40. This is a scan to check for breast cancer.  Colon cancer screening: It is important to start screening for colon cancer at age 45.  Have a colonoscopy test every 10 years (or more often if you're at risk) Or, ask your provider about stool tests like a FIT test every year or Cologuard test every 3 years.  To learn more about your testing options, visit:   .  For help making a decision, visit:   https://bit.ly/ap33253.  Prostate cancer screening test: If you have a prostate, ask your care team if a prostate cancer screening test (PSA) at age 55 is right for you.  Lung cancer screening: If you are a current or former smoker ages 50 to 80, ask your care team if ongoing lung cancer screenings are right for you.  For informational purposes only. Not to replace the advice of your health care provider. Copyright   2023 [facility]. All rights reserved. Clinically reviewed by the [facility]. [employer] [id number] - REV 01/24.  Preventing Falls: Care Instructions  Injuries and health problems such as trouble walking or poor eyesight can increase your risk of falling. So can some medicines. But there are things you can do to help  "prevent falls. You can exercise to get stronger. You can also arrange your home to make it safer.    Talk to your doctor about the medicines you take. Ask if any of them increase the risk of falls and whether they can be changed or stopped.   Try to exercise regularly. It can help improve your strength and balance. This can help lower your risk of falling.     Practice fall safety and prevention.    Wear low-heeled shoes that fit well and give your feet good support. Talk to your doctor if you have foot problems that make this hard.  Carry a cellphone or wear a medical alert device that you can use to call for help.  Use stepladders instead of chairs to reach high objects. Don't climb if you're at risk for falls. Ask for help, if needed.  Wear the correct eyeglasses, if you need them.    Make your home safer.    Remove rugs, cords, clutter, and furniture from walkways.  Keep your house well lit. Use night-lights in hallways and bathrooms.  Install and use sturdy handrails on stairways.  Wear nonskid footwear, even inside. Don't walk barefoot or in socks without shoes.    Be safe outside.    Use handrails, curb cuts, and ramps whenever possible.  Keep your hands free by using a shoulder bag or backpack.  Try to walk in well-lit areas. Watch out for uneven ground, changes in pavement, and debris.  Be careful in the winter. Walk on the grass or gravel when sidewalks are slippery. Use de-icer on steps and walkways. Add non-slip devices to shoes.    Put grab bars and nonskid mats in your shower or tub and near the toilet. Try to use a shower chair or bath bench when bathing.   Get into a tub or shower by putting in your weaker leg first. Get out with your strong side first. Have a phone or medical alert device in the bathroom with you.   Where can you learn more?  Go to https://www.[employer].net/patiented  Enter G117 in the search box to learn more about \"Preventing Falls: Care Instructions.\"  Current as of: July 17, " 2023               Content Version: 14.0    0304-3929 [employer].   Care instructions adapted under license by your healthcare professional. If you have questions about a medical condition or this instruction, always ask your healthcare professional. [employer] disclaims any warranty or liability for your use of this information.

## 2024-09-03 NOTE — PROGRESS NOTES
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   Following with ENT     Essential hypertension, benign  Stable no change in treatment plan.   - hydrochlorothiazide (HYDRODIURIL) 25 MG tablet; Take 1 tablet (25 mg) by mouth daily.  - losartan (COZAAR) 50 MG tablet; Take 2 tablets (100 mg) by mouth daily.  - CBC with platelets; Future  - Comprehensive metabolic panel; Future  - Lipid panel reflex to direct LDL Fasting; Future  - TSH with free T4 reflex; Future  - TSH with free T4 reflex  - Lipid panel reflex to direct LDL Fasting  - Comprehensive metabolic panel  - CBC with platelets    Cervical cancer screening    - HPV and Gynecologic Cytology Panel - Recommended Age 30 - 65 Years    Lumbar radiculopathy  Following with neuro   Pain is not well controlled   Side affects on cymbalta   Will try muscle relaxor and decrease dose of cymbalta  She will update me   - cyclobenzaprine (FLEXERIL) 10 MG tablet; Take 1 tablet (10 mg) by mouth 2 times daily as needed for muscle spasms.    Patient has been advised of split billing requirements and indicates understanding: Yes        BMI  Estimated body mass index is 42.41 kg/m  as calculated from the following:    Height as of this encounter: 1.645 m (5' 4.76\").    Weight as of this encounter: 114.8 kg (253 lb).   Weight management plan: Patient referred to endocrine and/or weight management specialty    Counseling  Appropriate preventive services were addressed with this patient via screening, questionnaire, or discussion as appropriate for fall prevention, nutrition, physical activity, Tobacco-use cessation, social engagement, weight loss and cognition.  " Checklist reviewing preventive services available has been given to the patient.  Reviewed patient's diet, addressing concerns and/or questions.   The patient was instructed to see the dentist every 6 months.   She is at risk for psychosocial distress and has been provided with information to reduce risk.           Fermín Tyler is a 53 year old, presenting for the following:  Physical        9/3/2024     7:42 AM   Additional Questions   Roomed by Manjula TENORIO   Accompanied by self        Health Care Directive  Patient does not have a Health Care Directive or Living Will: Discussed advance care planning with patient; information given to patient to review.    HPI      Hypertension Follow-up    Do you check your blood pressure regularly outside of the clinic? No   Are you following a low salt diet? No  Are your blood pressures ever more than 140 on the top number (systolic) OR more   than 90 on the bottom number (diastolic), for example 140/90? unsure    Morbid obesity   Affecting HTN and back pain   She has tried dieting and not much success  Is in pool therapy     She has weigh management referral             8/29/2024   General Health   How would you rate your overall physical health? (!) FAIR   Feel stress (tense, anxious, or unable to sleep) Only a little      (!) STRESS CONCERN      8/29/2024   Nutrition   Three or more servings of calcium each day? (!) NO   Diet: Regular (no restrictions)   How many servings of fruit and vegetables per day? (!) 2-3   How many sweetened beverages each day? 0-1            8/29/2024   Exercise   Days per week of moderate/strenous exercise 0 days   Average minutes spent exercising at this level 0 min      (!) EXERCISE CONCERN      8/29/2024   Social Factors   Frequency of gathering with friends or relatives Once a week   Worry food won't last until get money to buy more No   Food not last or not have enough money for food? No   Do you have housing? (Housing is defined as stable  permanent housing and does not include staying ouside in a car, in a tent, in an abandoned building, in an overnight shelter, or couch-surfing.) Yes   Are you worried about losing your housing? No   Lack of transportation? No   Unable to get utilities (heat,electricity)? No            9/3/2024   Fall Risk   Gait Speed Test (Document in seconds) 3.5   Gait Speed Test Interpretation Less than or equal to 5.00 seconds - PASS             2024   Dental   Dentist two times every year? (!) NO            2024   TB Screening   Were you born outside of the [place]? No              Today's PHQ-2 Score:       2024    11:23 AM   PHQ-2 (  [employer])   Q1: Little interest or pleasure in doing things 0   Q2: Feeling down, depressed or hopeless 0   PHQ-2 Score 0         2024   Substance Use   Alcohol more than 3/day or more than 7/wk No   Do you use any other substances recreationally? No        Social History     Tobacco Use    Smoking status: Former     Current packs/day: 0.00     Average packs/day: 1 pack/day for 25.0 years (25.0 ttl pk-yrs)     Types: Cigarettes     Start date: 1980     Quit date: 2005     Years since quittin.1    Smokeless tobacco: Never   Vaping Use    Vaping status: Never Used   Substance Use Topics    Alcohol use: Yes     Comment: occasionally    Drug use: No           10/11/2023   LAST FHS-7 RESULTS   1st degree relative breast or ovarian cancer No   Any relative bilateral breast cancer No   Any male have breast cancer No   Any ONE woman have BOTH breast AND ovarian cancer No   Any woman with breast cancer before 50yrs No   2 or more relatives with breast AND/OR ovarian cancer No   2 or more relatives with breast AND/OR bowel cancer No           Mammogram Screening - Mammogram every 1-2 years updated in Health Maintenance based on mutual decision making        2024   STI Screening   New sexual partner(s) since last STI/HIV test? No        History of abnormal Pap smear: YES  "- other categories - see link Cervical Cytology Screening Guidelines        Latest Ref Rng & Units 9/1/2023     7:57 AM 7/27/2022     7:00 AM 6/28/2019     4:40 PM   PAP / HPV   PAP  Negative for Intraepithelial Lesion or Malignancy (NILM)  Atypical squamous cells of undetermined significance (ASC-US)     PAP (Historical)    ASC-US    HPV 16 DNA Negative Negative  Negative     HPV 18 DNA Negative Negative  Negative     Other HR HPV Negative Negative  Negative       ASCVD Risk   The 10-year ASCVD risk score (Silvio SOLOMON, et al., 2019) is: 3.4%    Values used to calculate the score:      Age: 53 years      Sex: Female      Is Non- : No      Diabetic: No      Tobacco smoker: No      Systolic Blood Pressure: 136 mmHg      Is BP treated: Yes      HDL Cholesterol: 38 mg/dL      Total Cholesterol: 172 mg/dL           Reviewed and updated as needed this visit by Provider   Tobacco  Allergies  Meds  Problems  Med Hx  Surg Hx  Fam Hx                  Review of Systems  Constitutional, HEENT, cardiovascular, pulmonary, gi and gu systems are negative, except as otherwise noted.     Objective    Exam  /86   Pulse 86   Temp 98.9  F (37.2  C) (Tympanic)   Resp 20   Ht 1.645 m (5' 4.76\")   Wt 114.8 kg (253 lb)   LMP 02/12/2024 (Approximate)   SpO2 98%   BMI 42.41 kg/m     Estimated body mass index is 42.41 kg/m  as calculated from the following:    Height as of this encounter: 1.645 m (5' 4.76\").    Weight as of this encounter: 114.8 kg (253 lb).    Physical Exam  GENERAL: alert and no distress  EYES: Eyes grossly normal to inspection, PERRL and conjunctivae and sclerae normal  HENT: ear canals and TM's normal, nose and mouth without ulcers or lesions  NECK: no adenopathy, no asymmetry, masses, or scars  RESP: lungs clear to auscultation - no rales, rhonchi or wheezes  CV: regular rate and rhythm, normal S1 S2, no S3 or S4, no murmur, click or rub, no peripheral edema  ABDOMEN: " soft, nontender, no hepatosplenomegaly, no masses and bowel sounds normal   (female): normal female external genitalia, normal urethral meatus, normal vaginal mucosa  MS: no gross musculoskeletal defects noted, no edema  SKIN: no suspicious lesions or rashes  NEURO: Normal strength and tone, mentation intact and speech normal  PSYCH: mentation appears normal, affect normal/bright        Signed Electronically by: Claudia Mitchell MD

## 2024-09-04 LAB
HPV HR 12 DNA CVX QL NAA+PROBE: NEGATIVE
HPV16 DNA CVX QL NAA+PROBE: NEGATIVE
HPV18 DNA CVX QL NAA+PROBE: NEGATIVE
HUMAN PAPILLOMA VIRUS FINAL DIAGNOSIS: NORMAL

## 2024-09-09 LAB
BKR AP ASSOCIATED HPV REPORT: NORMAL
BKR LAB AP GYN ADEQUACY: NORMAL
BKR LAB AP GYN INTERPRETATION: NORMAL
BKR LAB AP PREVIOUS ABNL DX: NORMAL
BKR LAB AP PREVIOUS ABNORMAL: NORMAL
PATH REPORT.COMMENTS IMP SPEC: NORMAL
PATH REPORT.COMMENTS IMP SPEC: NORMAL
PATH REPORT.RELEVANT HX SPEC: NORMAL

## 2024-10-22 DIAGNOSIS — M54.16 LUMBAR RADICULOPATHY: ICD-10-CM

## 2024-10-23 RX ORDER — DULOXETIN HYDROCHLORIDE 30 MG/1
60 CAPSULE, DELAYED RELEASE ORAL AT BEDTIME
Qty: 90 CAPSULE | Refills: 3 | Status: SHIPPED | OUTPATIENT
Start: 2024-10-23

## 2024-10-29 ENCOUNTER — PATIENT OUTREACH (OUTPATIENT)
Dept: CARE COORDINATION | Facility: CLINIC | Age: 54
End: 2024-10-29
Payer: COMMERCIAL

## 2025-01-15 ASSESSMENT — SLEEP AND FATIGUE QUESTIONNAIRES
HOW LIKELY ARE YOU TO NOD OFF OR FALL ASLEEP WHILE LYING DOWN TO REST IN THE AFTERNOON WHEN CIRCUMSTANCES PERMIT: SLIGHT CHANCE OF DOZING
HOW LIKELY ARE YOU TO NOD OFF OR FALL ASLEEP WHILE WATCHING TV: WOULD NEVER DOZE
HOW LIKELY ARE YOU TO NOD OFF OR FALL ASLEEP WHILE SITTING QUIETLY AFTER LUNCH WITHOUT ALCOHOL: WOULD NEVER DOZE
HOW LIKELY ARE YOU TO NOD OFF OR FALL ASLEEP WHILE SITTING INACTIVE IN A PUBLIC PLACE: WOULD NEVER DOZE
HOW LIKELY ARE YOU TO NOD OFF OR FALL ASLEEP IN A CAR, WHILE STOPPED FOR A FEW MINUTES IN TRAFFIC: WOULD NEVER DOZE
HOW LIKELY ARE YOU TO NOD OFF OR FALL ASLEEP WHILE SITTING AND READING: SLIGHT CHANCE OF DOZING
HOW LIKELY ARE YOU TO NOD OFF OR FALL ASLEEP WHILE SITTING AND TALKING TO SOMEONE: WOULD NEVER DOZE
HOW LIKELY ARE YOU TO NOD OFF OR FALL ASLEEP WHEN YOU ARE A PASSENGER IN A CAR FOR AN HOUR WITHOUT A BREAK: WOULD NEVER DOZE

## 2025-01-16 NOTE — PROGRESS NOTES
"    New Medical Weight Management Consult    PATIENT:  Nilsa Vazquez  MRN:         [id number]  :         1970  RASHEEDA:         2025    Dear Dr. Das,    I had the pleasure of seeing your patient, Nilsa Vazquez. Full intake/assessment was done to determine barriers to weight loss success and develop a treatment plan. Nilsa Vazquez is a 54 year old female interested in treatment of medical problems associated with excess weight. She has a height of 5' 5\", a weight of 253 lbs 0 oz, and the calculated Body mass index is 42.1 kg/m .    ASSESSMENT/PLAN:  1. Morbid obesity (H) (Primary)  We discussed healthy habits to assist with weight loss. She will work on planning meals ahead of time using the plate method for portion control and macronutrient proportions. She will try keeping a food journal. She will check out the [employer] exercise video sites I put in her AVS.She may do well with the Sit and Be Fit videos. She will also consider starting pool exercise again. We discussed medication that may assist with weight loss. zepbound was prescribed. Risks/ benefits and possible side effects were discussed and questions were answered. Written information was given.  If it is not covered by her insurance we could consider compounded semaglutide or generic contrave.    2. Essential hypertension, benign  This may improve with healthy habits and weight loss.     3. Dyslipidemia  This may improve with healthy habits and weight loss.      She has the following co-morbidities:        1/15/2025     7:27 PM   --   I have the following health issues associated with obesity High Blood Pressure    High Cholesterol   I have the following symptoms associated with obesity Depression    Back Pain    Fatigue    Hip Pain           1/15/2025     7:27 PM   Patient Goals   If yes, please indicate which surgery? Back surgery for nerve pain           1/15/2025     7:27 PM   Referring Provider   Please name the provider who " "referred you to Medical Weight Management  If you do not know, please answer \"I Don't Know\" Spine specialist           1/15/2025     7:27 PM   Weight History   How concerned are you about your weight? Very Concerned   I became overweight As a Teenager   The following factors have contributed to my weight gain Change in Schedule    Started on Medication that Caused Weight Gain    After Quitting Smoking    Eating Wrong Types of Food    Lack of Exercise    Genetic (Runs in the Family)   I have tried the following methods to lose weight Atkins-type Diet (Low Carb/High Protein)    Nutrisystem   My lowest weight since age 17 was 150   My highest weight since age 17 was 255   The most weight I have ever lost was (lbs) 70   I have the following family history of obesity/being overweight My mother is overweight    My father is overweight    One or more of my siblings are overweight    Many of my relatives are overweight   How has your weight changed over the last year? Gained   How many pounds? 20?           1/15/2025     7:27 PM   Diet Recall Review with Patient   If you do eat lunch, what types of food do you typically eat? B: skips most of the time, will pack yogurt and granola or granola bar, often forgets to eat it    L At home eggs and toast, or bagel. Sometimes leftovers from dinner. In office usually a turkey sandwich, cottage cheese and grapes   If you do eat supper, what types of food do you typically eat? Meat , potatoes, pasta. I cook almost all meals my  likes a filling meal. Usually vegetables   How many glasses of juice do you drink in a typical day? 0   How many of glasses of milk do you drink in a typical day? 0   How many 8oz glasses of sugar containing drinks such as Maninder-Aid/sweet tea do you drink in a day? 0   How many cans/bottles of sugar pop/soda/tea/sports drinks do you drink in a day? 0   How many cans/bottles of diet pop/soda/tea or sports drink do you drink in a day? 0   How often do you " have a drink of alcohol? 2-4 Times a Month   If you do drink, how many drinks might you have in a day? 3-4     She stopped sugared beverages when she was on topamax. She recently stopped it because she has been on it awhile. It helped for her headaches and they haven't come back. She doesn't really struggle with hunger.        1/15/2025     7:27 PM   Eating Habits   Generally, my meals include foods like these bread, pasta, rice, potatoes, corn, crackers, sweet dessert, pop, or juice Almost Everyday   Generally, my meals include foods like these fried meats, brats, burgers, french fries, pizza, cheese, chips, or ice cream A Few Times a Week   Eat fast food (like McDonal[employer], [employer], Taco Bell) Never   Eat at a buffet or sit-down restaurant Once a Week   Eat most of my meals in front of the TV or computer Almost Everyday   Often skip meals, eat at random times, have no regular eating times Almost Everyday   Rarely sit down for a meal but snack or graze throughout Never   Eat extra snacks between meals A Few Times a Week   Eat most of my food at the end of the day Almost Everyday   Eat in the middle of the night or wake up at night to eat Never   Eat extra snacks to prevent or correct low blood sugar Never   Eat to prevent acid reflux or stomach pain Never   Worry about not having enough food to eat Never   I eat when I am depressed Never   I eat when I am stressed Never   I eat when I am bored Once a Week   I eat when I am anxious Never   I eat when I am happy or as a reward Less Than Weekly   I feel hungry all the time even if I just have eaten Less Than Weekly   Feeling full is important to me A Few Times a Week   I finish all the food on my plate even if I am already full Almost Everyday   I can't resist eating delicious food or walk past the good food/smell Never   I eat/snack without noticing that I am eating Never   I eat when I am preparing the meal Never   I eat more than usual when I see others eating  "Never   I have trouble not eating sweets, ice cream, cookies, or chips if they are around the house Never   I think about food all day Never   Please list any other foods you crave? Popcorn     Meals not prepared at home per week: 1-2         1/15/2025     7:27 PM   Amount of Food   I feel out of control when eating Never   I eat a large amount of food, like a loaf of bread, a box of cookies, a pint/quart of ice cream, all at once Never   I eat a large amount of food even when I am not hungry Monthly   I eat rapidly Never   I eat alone because I feel embarrassed and do not want others to see how much I have eaten Never   I eat until I am uncomfortably full Never   I feel bad, disgusted, or guilty after I overeat Never           1/15/2025     7:27 PM   Activity/Exercise History   How much of a typical 12 hour day do you spend sitting? Most of the Day   How much of a typical 12 hour day do you spend lying down? Half the Day   How much of a typical day do you spend walking/standing? Less Than Half the Day   How many hours (not including work) do you spend on the TV/Video Games/Computer/Tablet/Phone? 6 Hours or More   How many times a week are you active for the purpose of exercise? Never- pain,tumor in her brain, when she gets her heart rate up she hears more \"whooshing\"   What keeps you from being more active? Pain   How many total minutes do you spend doing some activity for the purpose of exercising when you exercise? None     She has done pool therapy in the past. There is a pool in town.    PAST MEDICAL HISTORY:  Past Medical History:   Diagnosis Date    Abnormal Pap smear of cervix 2019    see problem list    Hypertension     Paraganglioma (H) 2007           1/15/2025     7:27 PM   Work/Social History Reviewed With Patient   My employment status is Full-Time   My job is [employer]   How much of your job is spent on the computer or phone? 100%   How many hours do you spend commuting to work daily? 1 hour " round trip 5 days a week every third week   What is your marital status? /In a Relationship   If in a relationship, is your significant other overweight? No   If you have children, are they overweight? Yes   Who do you live with?    Who does the food shopping? Me           1/15/2025     7:27 PM   Mental Health History Reviewed With Patient   Have you ever been physically or sexually abused? No   How often in the past 2 weeks have you felt little interest or pleasure in doing things? More Than Half the Days   Over the past 2 weeks how often have you felt down, depressed, or hopeless? For Several Days           1/15/2025     7:27 PM   Sleep History Reviewed With Patient   How many hours do you sleep at night? 6       MEDICATIONS:   Current Outpatient Medications   Medication Sig Dispense Refill    cyclobenzaprine (FLEXERIL) 10 MG tablet Take 1 tablet (10 mg) by mouth 2 times daily as needed for muscle spasms. 60 tablet 5    diphenhydrAMINE (BENADRYL) 25 MG capsule Take 25 mg by mouth at bedtime      DULoxetine (CYMBALTA) 30 MG capsule Take 2 capsules (60 mg) by mouth at bedtime. 90 capsule 3    hydrochlorothiazide (HYDRODIURIL) 25 MG tablet Take 1 tablet (25 mg) by mouth daily. 90 tablet 3    losartan (COZAAR) 50 MG tablet Take 2 tablets (100 mg) by mouth daily. 180 tablet 3    multivitamin, therapeutic (THERA-VIT) TABS tablet Take 1 tablet by mouth daily Separate vitamins      prochlorperazine (COMPAZINE) 10 MG tablet Take 1 tablet (10 mg) by mouth every 6 hours as needed for nausea or vomiting (migraine) 30 tablet 3    rizatriptan (MAXALT) 5 MG tablet Take 1 tablet (5 mg) by mouth at onset of headache for migraine (repeat in 2 hours if needed) Do not take if bp is >160/100. 18 tablet 11    zolpidem (AMBIEN) 5 MG tablet Take 1 tablet (5 mg) by mouth every evening as needed for sleep. 30 tablet 5     ROS  General  Fatigue: yes  HEENT  Hx of glaucoma: no  Cardiovascular  Hx of heart disease: no-  uncontrolled hypertension  Palpitations: no  Gastrointestinal  Pancreatitis: no  Psychiatric  Moods Stable: yes  Endocrine  No personal or family history of medullary thyroid cancer: no  No personal or family history of MEN2   Neurologic  Hx of seizures: no  Migraine headaches: yes    Birth control: menopause  Kidney stones: no     ALLERGIES:   No Known Allergies    PHYSICAL EXAM:  Wt Readings from Last 4 Encounters:   01/20/25 114.8 kg (253 lb)   09/03/24 114.8 kg (253 lb)   07/25/24 114.8 kg (253 lb)   05/02/24 114.8 kg (253 lb)      BP Readings from Last 3 Encounters:   09/03/24 136/86   07/02/24 (!) 191/76   06/14/24 (!) 152/82      GENERAL: alert and no distress  EYES: Eyes grossly normal to inspection.  No discharge or erythema, or obvious scleral/conjunctival abnormalities.  RESP: No audible wheeze, cough, or visible cyanosis.    SKIN: Visible skin clear. No significant rash, abnormal pigmentation or lesions.  NEURO: Cranial nerves grossly intact.  Mentation and speech appropriate for age.  PSYCH: Appropriate affect, tone, and pace of words     FOLLOW-UP:   As scheduled with the dietician and in 4-5 months with myself     Total time spent on the date of this encounter doing: chart review, review of test results, patient visit, physical exam, education, counseling, developing plan of care and documenting = 66 minutes.     Sincerely,    NIMISHA Bell MD

## 2025-01-20 ENCOUNTER — VIRTUAL VISIT (OUTPATIENT)
Dept: SURGERY | Facility: CLINIC | Age: 55
End: 2025-01-20
Payer: COMMERCIAL

## 2025-01-20 VITALS — WEIGHT: 253 LBS | HEIGHT: 65 IN | BODY MASS INDEX: 42.15 KG/M2

## 2025-01-20 DIAGNOSIS — M54.16 LUMBAR RADICULOPATHY: ICD-10-CM

## 2025-01-20 DIAGNOSIS — I10 ESSENTIAL HYPERTENSION, BENIGN: ICD-10-CM

## 2025-01-20 DIAGNOSIS — E66.01 MORBID OBESITY (H): Primary | ICD-10-CM

## 2025-01-20 DIAGNOSIS — E78.5 DYSLIPIDEMIA: ICD-10-CM

## 2025-01-20 PROCEDURE — 98003 SYNCH AUDIO-VIDEO NEW HI 60: CPT | Performed by: FAMILY MEDICINE

## 2025-01-20 ASSESSMENT — PAIN SCALES - GENERAL: PAINLEVEL_OUTOF10: MODERATE PAIN (6)

## 2025-01-20 NOTE — PATIENT INSTRUCTIONS
Eat Better ? Move More ? Live Well    Eat 3 nutrient-rich meals each day    Don t skip meals--it will cause you to overeat later in the day!    Eating fiber (vegetables/fruits/whole grains) and protein with meals helps you stay full longer    Choose foods with less than 10 grams of sugar and 5 grams of fat per serving to prevent excess calories and weight gain  Eat around the same times each day to develop a routine eating schedule   Avoid snacking unless physically hungry.   Planned snacks: 1-2 times per day and no more than 150 calories    Eat protein first   Protein helps with healing, maintaining adequate muscle mass, reducing hunger and optimizing nutritional status   Aim for 60-80 grams of protein per day   Fill up on Fiber   Fiber comes from plants--fruits, veggies, whole grains, nuts/seeds and beans   Fiber is low in calories, high in phytonutrients and helps you stay full longer   Aim for 25-35 grams per day by eating fiber with meals and snacks  Eat [id number]   Take 20-30 minutes to eat each meal by taking small bites, chewing foods to applesauce consistency or 20-30 times before you swallow   Eating foods too fast can delay satiety/fullness signals and increase overeating   Slow down your eating by using toddler utensils, putting your fork/spoon down between bites and not watching TV or emailing during meals!   Keep a Journal         Writing down what you eat, how you feel and when you are active helps you identify new changes to work on from week to week         Look for ways to cut 100 calories from your current diet 2-3 times per day  Drink 64 ounces of 0-Calorie drinks between meals   Water   Zero calorie Propel  or Vitamin Water     So[employer]  Zero Calories   Crystal Light , Sugar-Free Maninder-Aid , and other sugar-free lemonade or flavored carroll   Keep Caffeine to less than 300mg per day ie: 3-6oz cups coffee     Work up to 45-60 minutes of physical activity most days of the week   Helps with  losing weight and prevent regaining those extra pounds!    Do a combo of cardio (walking/water exercises) and strength training (lifting weights/Vinyasa yoga)    Avoid Mindless Eating   Be present when you eat--take note of the smell, taste and quality of your food   Make a list of alternative activities you could do to prevent eating out of boredom/stress  Go for a walk, call a friend, chew gum, paint your nails, re-organize the garage, etc         Here are the [employer] exercise sites:    Yoga-https://www.SocioSquare.PerceptiMed/user/yogawithadriene    Body strength activities, dance, high intensity interval training- https://www.SocioSquare.PerceptiMed/user/popsugartvfit    Strength training- https://[email or web address].PerceptiMed/user/KozakSportsPerform    Walking- https://www.SocioSquare.PerceptiMed/user/walkathomemedia    Sit and Be Fit- https://www.[email or web address].PerceptiMed/channel/[email or web address]_g    Low impact cardio- Alma Salazar- https://www.SocioSquare.com/channel/[email or web address]    Cardio [place]ir- https://www.SocioSquare.com/channel/[email or web address]    30 Min low impact cardio- https://www.Packet Digitalube.com/watch?v=gC_L9qAHVJ8    Body Project- https://www.SocioSquare.PerceptiMed/[email or web address]/[email or web address]

## 2025-01-20 NOTE — NURSING NOTE
Current patient location: [address] [place] [id number]    Is the patient currently in the state of [place]? YES    Visit mode: VIDEO    If the visit is dropped, the patient can be reconnected by:VIDEO VISIT: Text to cell phone:   Telephone Information:   Mobile [phone]       Will anyone else be joining the visit? NO  (If patient encounters technical issues they should call [phone]    Are changes needed to the allergy or medication list? No    Are refills needed on medications prescribed by this physician? Discuss with provider    Rooming Documentation:  Questionnaire(s) completed    Reason for visit: Consult    Pt states 6/10 low back/hip/leg/toes pain chronic.    Addison GONZALEZ

## 2025-01-20 NOTE — LETTER
"2025      Nilsa Vazquez  [address] [id number]      Dear Colleague,    Thank you for referring your patient, Nilsa Vazquez, to the [facility] [place]. Please see a copy of my visit note below.        New Medical Weight Management Consult    PATIENT:  Nilsa Vazquez  MRN:         [id number]  :         1970  RASHEEDA:         2025    Dear Dr. Das,    I had the pleasure of seeing your patient, Nilsa Vazquez. Full intake/assessment was done to determine barriers to weight loss success and develop a treatment plan. Nilsa Vazquez is a 54 year old female interested in treatment of medical problems associated with excess weight. She has a height of 5' 5\", a weight of 253 lbs 0 oz, and the calculated Body mass index is 42.1 kg/m .    ASSESSMENT/PLAN:  1. Morbid obesity (H) (Primary)  We discussed healthy habits to assist with weight loss. She will work on planning meals ahead of time using the plate method for portion control and macronutrient proportions. She will try keeping a food journal. She will check out the [employer] exercise video sites I put in her AVS.She may do well with the Sit and Be Fit videos. She will also consider starting pool exercise again. We discussed medication that may assist with weight loss. zepbound was prescribed. Risks/ benefits and possible side effects were discussed and questions were answered. Written information was given.  If it is not covered by her insurance we could consider compounded semaglutide or generic contrave.    2. Essential hypertension, benign  This may improve with healthy habits and weight loss.     3. Dyslipidemia  This may improve with healthy habits and weight loss.      She has the following co-morbidities:        1/15/2025     7:27 PM   --   I have the following health issues associated with obesity High Blood Pressure    High Cholesterol   I have the following symptoms " "associated with obesity Depression    Back Pain    Fatigue    Hip Pain           1/15/2025     7:27 PM   Patient Goals   If yes, please indicate which surgery? Back surgery for nerve pain           1/15/2025     7:27 PM   Referring Provider   Please name the provider who referred you to Medical Weight Management  If you do not know, please answer \"I Don't Know\" Spine specialist           1/15/2025     7:27 PM   Weight History   How concerned are you about your weight? Very Concerned   I became overweight As a Teenager   The following factors have contributed to my weight gain Change in Schedule    Started on Medication that Caused Weight Gain    After Quitting Smoking    Eating Wrong Types of Food    Lack of Exercise    Genetic (Runs in the Family)   I have tried the following methods to lose weight Atkins-type Diet (Low Carb/High Protein)    Nutrisystem   My lowest weight since age 17 was 150   My highest weight since age 17 was 255   The most weight I have ever lost was (lbs) 70   I have the following family history of obesity/being overweight My mother is overweight    My father is overweight    One or more of my siblings are overweight    Many of my relatives are overweight   How has your weight changed over the last year? Gained   How many pounds? 20?           1/15/2025     7:27 PM   Diet Recall Review with Patient   If you do eat lunch, what types of food do you typically eat? B: skips most of the time, will pack yogurt and granola or granola bar, often forgets to eat it    L At home eggs and toast, or bagel. Sometimes leftovers from dinner. In office usually a turkey sandwich, cottage cheese and grapes   If you do eat supper, what types of food do you typically eat? Meat , potatoes, pasta. I cook almost all meals my  likes a filling meal. Usually vegetables   How many glasses of juice do you drink in a typical day? 0   How many of glasses of milk do you drink in a typical day? 0   How many 8oz glasses " of sugar containing drinks such as Maninder-Aid/sweet tea do you drink in a day? 0   How many cans/bottles of sugar pop/soda/tea/sports drinks do you drink in a day? 0   How many cans/bottles of diet pop/soda/tea or sports drink do you drink in a day? 0   How often do you have a drink of alcohol? 2-4 Times a Month   If you do drink, how many drinks might you have in a day? 3-4     She stopped sugared beverages when she was on topamax. She recently stopped it because she has been on it awhile. It helped for her headaches and they haven't come back. She doesn't really struggle with hunger.        1/15/2025     7:27 PM   Eating Habits   Generally, my meals include foods like these bread, pasta, rice, potatoes, corn, crackers, sweet dessert, pop, or juice Almost Everyday   Generally, my meals include foods like these fried meats, brats, burgers, french fries, pizza, cheese, chips, or ice cream A Few Times a Week   Eat fast food (like McDonalds, Burger Trevin, Taco Bell) Never   Eat at a buffet or sit-down restaurant Once a Week   Eat most of my meals in front of the TV or computer Almost Everyday   Often skip meals, eat at random times, have no regular eating times Almost Everyday   Rarely sit down for a meal but snack or graze throughout Never   Eat extra snacks between meals A Few Times a Week   Eat most of my food at the end of the day Almost Everyday   Eat in the middle of the night or wake up at night to eat Never   Eat extra snacks to prevent or correct low blood sugar Never   Eat to prevent acid reflux or stomach pain Never   Worry about not having enough food to eat Never   I eat when I am depressed Never   I eat when I am stressed Never   I eat when I am bored Once a Week   I eat when I am anxious Never   I eat when I am happy or as a reward Less Than Weekly   I feel hungry all the time even if I just have eaten Less Than Weekly   Feeling full is important to me A Few Times a Week   I finish all the food on my plate  "even if I am already full Almost Everyday   I can't resist eating delicious food or walk past the good food/smell Never   I eat/snack without noticing that I am eating Never   I eat when I am preparing the meal Never   I eat more than usual when I see others eating Never   I have trouble not eating sweets, ice cream, cookies, or chips if they are around the house Never   I think about food all day Never   Please list any other foods you crave? Popcorn     Meals not prepared at home per week: 1-2         1/15/2025     7:27 PM   Amount of Food   I feel out of control when eating Never   I eat a large amount of food, like a loaf of bread, a box of cookies, a pint/quart of ice cream, all at once Never   I eat a large amount of food even when I am not hungry Monthly   I eat rapidly Never   I eat alone because I feel embarrassed and do not want others to see how much I have eaten Never   I eat until I am uncomfortably full Never   I feel bad, disgusted, or guilty after I overeat Never           1/15/2025     7:27 PM   Activity/Exercise History   How much of a typical 12 hour day do you spend sitting? Most of the Day   How much of a typical 12 hour day do you spend lying down? Half the Day   How much of a typical day do you spend walking/standing? Less Than Half the Day   How many hours (not including work) do you spend on the TV/Video Games/Computer/Tablet/Phone? 6 Hours or More   How many times a week are you active for the purpose of exercise? Never- pain,tumor in her brain, when she gets her heart rate up she hears more \"whooshing\"   What keeps you from being more active? Pain   How many total minutes do you spend doing some activity for the purpose of exercising when you exercise? None     She has done pool therapy in the past. There is a pool in town.    PAST MEDICAL HISTORY:  Past Medical History:   Diagnosis Date     Abnormal Pap smear of cervix 2019    see problem list     Hypertension      Paraganglioma (H) 2007 "           1/15/2025     7:27 PM   Work/Social History Reviewed With Patient   My employment status is Full-Time   My job is [employer]   How much of your job is spent on the computer or phone? 100%   How many hours do you spend commuting to work daily? 1 hour round trip 5 days a week every third week   What is your marital status? /In a Relationship   If in a relationship, is your significant other overweight? No   If you have children, are they overweight? Yes   Who do you live with?    Who does the food shopping? Me           1/15/2025     7:27 PM   Mental Health History Reviewed With Patient   Have you ever been physically or sexually abused? No   How often in the past 2 weeks have you felt little interest or pleasure in doing things? More Than Half the Days   Over the past 2 weeks how often have you felt down, depressed, or hopeless? For Several Days           1/15/2025     7:27 PM   Sleep History Reviewed With Patient   How many hours do you sleep at night? 6       MEDICATIONS:   Current Outpatient Medications   Medication Sig Dispense Refill     cyclobenzaprine (FLEXERIL) 10 MG tablet Take 1 tablet (10 mg) by mouth 2 times daily as needed for muscle spasms. 60 tablet 5     diphenhydrAMINE (BENADRYL) 25 MG capsule Take 25 mg by mouth at bedtime       DULoxetine (CYMBALTA) 30 MG capsule Take 2 capsules (60 mg) by mouth at bedtime. 90 capsule 3     hydrochlorothiazide (HYDRODIURIL) 25 MG tablet Take 1 tablet (25 mg) by mouth daily. 90 tablet 3     losartan (COZAAR) 50 MG tablet Take 2 tablets (100 mg) by mouth daily. 180 tablet 3     multivitamin, therapeutic (THERA-VIT) TABS tablet Take 1 tablet by mouth daily Separate vitamins       prochlorperazine (COMPAZINE) 10 MG tablet Take 1 tablet (10 mg) by mouth every 6 hours as needed for nausea or vomiting (migraine) 30 tablet 3     rizatriptan (MAXALT) 5 MG tablet Take 1 tablet (5 mg) by mouth at onset of headache for migraine (repeat in 2  hours if needed) Do not take if bp is >160/100. 18 tablet 11     zolpidem (AMBIEN) 5 MG tablet Take 1 tablet (5 mg) by mouth every evening as needed for sleep. 30 tablet 5     ROS  General  Fatigue: yes  HEENT  Hx of glaucoma: no  Cardiovascular  Hx of heart disease: no- uncontrolled hypertension  Palpitations: no  Gastrointestinal  Pancreatitis: no  Psychiatric  Moods Stable: yes  Endocrine  No personal or family history of medullary thyroid cancer: no  No personal or family history of MEN2   Neurologic  Hx of seizures: no  Migraine headaches: yes    Birth control: menopause  Kidney stones: no     ALLERGIES:   No Known Allergies    PHYSICAL EXAM:  Wt Readings from Last 4 Encounters:   01/20/25 114.8 kg (253 lb)   09/03/24 114.8 kg (253 lb)   07/25/24 114.8 kg (253 lb)   05/02/24 114.8 kg (253 lb)      BP Readings from Last 3 Encounters:   09/03/24 136/86   07/02/24 (!) 191/76   06/14/24 (!) 152/82      GENERAL: alert and no distress  EYES: Eyes grossly normal to inspection.  No discharge or erythema, or obvious scleral/conjunctival abnormalities.  RESP: No audible wheeze, cough, or visible cyanosis.    SKIN: Visible skin clear. No significant rash, abnormal pigmentation or lesions.  NEURO: Cranial nerves grossly intact.  Mentation and speech appropriate for age.  PSYCH: Appropriate affect, tone, and pace of words     FOLLOW-UP:   As scheduled with the dietician and in 4-5 months with myself     Total time spent on the date of this encounter doing: chart review, review of test results, patient visit, physical exam, education, counseling, developing plan of care and documenting = 66 minutes.     Sincerely,    NIMISHA Bell MD          Virtual Visit Details    Type of service:  Video Visit     Originating Location (pt. Location): Home    Distant Location (provider location):  Off-site  Platform used for Video Visit: [employer]  Video start time: 3:01 pm  Video end time: 3:43 pm       Again, thank you for allowing me  to participate in the care of your patient.        Sincerely,        NIMISHA Bell MD    Electronically signed

## 2025-01-20 NOTE — PROGRESS NOTES
Virtual Visit Details    Type of service:  Video Visit     Originating Location (pt. Location): Home    Distant Location (provider location):  Off-site  Platform used for Video Visit: WebMD  Video start time: 3:01 pm  Video end time: 3:43 pm

## 2025-01-27 ENCOUNTER — VIRTUAL VISIT (OUTPATIENT)
Dept: SURGERY | Facility: CLINIC | Age: 55
End: 2025-01-27
Payer: COMMERCIAL

## 2025-01-27 DIAGNOSIS — E78.5 DYSLIPIDEMIA: ICD-10-CM

## 2025-01-27 DIAGNOSIS — E66.01 OBESITY, CLASS III, BMI 40-49.9 (MORBID OBESITY) (H): Primary | ICD-10-CM

## 2025-01-27 PROCEDURE — 97802 MEDICAL NUTRITION INDIV IN: CPT

## 2025-01-27 NOTE — PATIENT INSTRUCTIONS
Goals established:  Avoid skipping meals- target 5 small meals per day - 10-15g PRO each  Pair all meals or snack with a fiber source   80oz water goal  Track calories at 6893-7001 kcal per day       Eat Better ? Move More ? Live Well    Eat 3 nutrient-rich meals each day     Don't skip meals--it will cause you to overeat later in the day!     Eating fiber (vegetables/fruits/whole grains) and protein with meals helps you stay full longer     Choose foods with less than 10 grams of sugar and 5 grams of fat per serving to prevent excess calories and weight re-gain   Eat around the same times each day to develop a routine eating schedule    Avoid snacking unless physically hungry.   Planned snacks: 1-2 times per day and no more than 150 calories    Eat protein first    Protein helps with healing, maintaining adequate muscle mass, reducing hunger and optimizing nutritional status    Aim for  grams of protein per day   Fill up on Fiber    Fiber comes from plants--fruits, veggies, whole grains, nuts/seeds and beans    Fiber is low in calories, high in phytonutrients and helps you stay full longer    Aim for 25-35 grams per day by eating fiber with meals and snacks  Eat [id number]    Take 20-30 minutes to eat each meal by taking small bites, chewing foods to applesauce consistency or 20-30 times before you swallow    Eating foods too fast can delay satiety/fullness signals and increase overeating   Slow down your eating by using toddler utensils, putting your fork/spoon down between bites and not watching TV or emailing during meals!   Keep a Journal          Writing down what you eat, how you feel and when you are active helps you identify new changes to work on from week to week          Look for ways to cut 100 calories from your current diet 2-3 times per day  Drink 64 ounces of 0-Calorie drinks between meals    Water    Zero calorie Propel  or Vitamin Water      SoBe Lif[place]ater  Zero Calories    Crystal Light ,  Sugar-Free Maninder-Aid , and other sugar-free lemonade or flavored carroll    Keep Caffeine to less than 300mg per day ie: 3-6oz cups coffee     Work up to 45-60 minutes of physical activity most days of the week    Helps with losing weight and prevent regaining those extra pounds!     Do a combo of cardio (walking/water exercises) and strength training (lifting weights/Vinyasa yoga)    Avoid Mindless Eating    Be present when you eat--take note of the smell, taste and quality of your food    Make a list of alternative activities you could do to prevent eating out of boredom/stress  Go for a walk, call a friend, chew gum, paint your nails, re-organize the garage, etc      LEAN PROTEIN SOURCES    Protein Source Portion Calories Grams of Protein                           Nonfat, plain Greek yogurt    (10 grams sugar or less) 3/4 cup (6 oz)  12-17   Light Yogurt (10 grams sugar or less) 3/4 cup (6 oz)  6-8   Protein Shake 1 shake 110-180 15-30   Skim/1% Milk or lactose-free milk 1 cup ( 8 oz)  8   Plain or light, flavored soymilk 1 cup  7-8   Plain or light, hemp milk 1 cup 110 6   Fat Free or 1% Cottage Cheese 1/2 cup 90 15   Part skim ricotta cheese 1/2 cup 100 14   Part skim or reduced fat cheese slices 1/4cup, 3 dice 65-80 8     Mozzarella String Cheese 1 80 8   Canned tuna, chicken, crab or salmon  (canned in water)  1/2 cup 100 15-20   White fish (broiled, grilled, baked) 3 ounces 100 21   [place]/Tuna (broiled, grilled, baked) 3 ounces 150-180 21   Shrimp, Scallops, Lobster, Crab 3 ounces 100 21   Pork loin, Pork Tenderloin 3 ounces 150 21   Boneless, skinless chicken /turkey breast                          (broiled, grilled, baked) 3 ounces 120 21   [place], [place], [place], and Venison 3 ounces 120 21   Lean cuts of red meat and pork (sirloin,   round, tenderloin, flank, ground 93%-96%) 3 ounces 170 21   Lean or Extra Lean Ground Turkey 1/2 cup 150 20   90-95% Lean [place] Burger 1 shayan 140-180  21   Low-fat casserole with lean meat 3/4 cup 200 17   Luncheon Meats                                                        (turkey, lean ham, roast beef, chicken) 3 ounces 100 21   Egg (boiled, poached, scrambled) 1 Egg 60 7   Egg Substitute 1/2 cup 70 10   Nuts (limit to 1 serving per day)  3 Tbsp. 150 7   Nut [place] (peanut, almond)  Limit to 1 serving or less daily 1 Tbsp. 90 4   Soy Burger (varies) 1  10-15   Edamame  1/2 cup ~95 9   Garbanzo, Black, [place]avez Beans 1/2 cup 110 7   Refried Beans 1/2 cup 100 7   Kidney and Lima beans 1/2 cup 110 7   Tempeh 3 oz 175 18   Vegan crumbles 1/2 cup 100 14   Tofu 1/2 cup 110 14   Chili (beans and extra lean beef or turkey) 1 cup 200 23   Lentil Stew/Soup 1 cup 150 12   Black Bean Soup 1 cup 175 12     Carbohydrates  Carbohydrates fuel your body with glucose (sugar)--the energy your body needs so you can do your daily activities.  Carbohydrates offer an immediate source of energy for your body. They provide the fuel for your muscles and organs, such as your brain.     Types of Carbohydrates     Complex Carbohydrates are higher in fiber and keep you feeling full longer--helping you eat less.   These are found in nearly all plant-based foods and usually take longer for the body to digest.  They are most commonly found in whole-wheat bread, whole-grain pasta, brown rice, starchy vegetables,   and fruits  Refined Carbohydrates require almost NO WORK for digestion and break down into glucose more quickly   than complex carbohydrates. Refined carbohydrates are usually high in calories and low in nutrients and fiber--  eating more of these can lead to weight gain.  Thinking about eliminating carbohydrates???  If you do not eat enough carbohydrates, the following can occur:  Fatigue  Muscle cramps  Poor mental function  Fatigue easily results from deprivation of carbohydrates, which is seen in people who fast, possibly interfering with activities of daily  living.      Thinking about eliminating carbohydrates???  If you do not eat enough carbohydrates, the following can occur:  Fatigue  Muscle cramps  Poor mental function  Fatigue easily results from deprivation of carbohydrates, which is seen in people who fast, possibly interfering with activities of daily living    Carbohydrates are your body's first choice for fuel. If given a choice of several types of foods simultaneously, your body will use the energy from carbohydrates first.    What foods contain carbohydrates?  Choose the following foods containing carbohydrates (the BEST ones to eat):   Fruit-fresh, frozen, canned in their own juices  Whole grains:  Whole-wheat breads  Brown rice  Oatmeal  Whole-grain cereals  Other starchy foods containing a minimum of 3 grams (g) fiber/100 calories  The ingredient label should list whole wheat or whole grain as one of the first ingredients (bulgur, quinoa, buckwheat, millet, spelt, faro, kasha)  Milk or yogurt (a natural source of carbohydrates):  Low-fat milk  Fat-free milk  Yogurt   Beans or legumes     Starchy vegetables, raw or frozen:  Potatoes  Peas  Corn    AVOID or limit the following foods containing carbohydrates:  Refined sugars, such as in:  Candy  Desserts-ice cream, cakes, pies, brownies, frozen yogurt, sherbet/sorbet  Cookies  White flour: bread/pasta/crackers/rice/tortillas  Sugary snacks: sweetened cereal, granola bars, cereal bars, donuts, muffins, bagels  Sugary Drinks:  Fruit Juice, Smoothies  Sports Drinks  Regular Soda    What are typical serving sizes or portions?  The following are some serving and portion sizes for foods containing carbohydrates:  One medium piece of fruit, about 4?5 ounces (oz) (-tennis ball)  1 cup (C) berries or melon    C canned fruit    C juice (100% vegetable)    C starchy vegetables, cooked or chopped  One slice whole-grain bread  ? C brown rice, quinoa, buckwheat, millet, spelt, faro, kasha    C oatmeal (dry)    C  bulgur  One small tortilla (less than 6inch diameter)    C wheat germ  1 oz pretzels     C flaked cereal        Calorie-Controlled Sample Meal Plans    Examples of small healthy meals    Breakfast   Omelet made with   cup to   cup egg substitute or 2 eggs    cup chopped vegetables  1-2 tbsp. of light cheese     cup salsa  Medium banana    1 cup non-fat plain, Greek yogurt mixed with 1 cup berries and 1-2 Tbsp nuts or cereal   -3/4 cup skim or 1% cottage cheese    cup unsweetened whole-grain cereal  1/2 cup of fresh strawberries  Whole-wheat English muffin or mini bagel, 1 scrambled egg and 1 slice Swiss cheese   Small orange  Protein Bar or Shake (15-30 grams protein and 15-25 grams Carbohydrates)    cup cottage cheese, low-fat    cup fresh fruit    11 ounces of Slim Fast Low Carb (only), Chantelle's Advantage, EAS Carb Control    Lunch/Dinner  2-3 slices roasted turkey breast  1 tbsp. of fat free mayonnaise  2 slices of  whole-wheat bread, Medium apple  10 baby carrots with 1 tbsp. of low-fat dip     cup water packed tuna or chicken  1 tablespoons of low-fat mayonnaise  1-2 tbsp. dill relish  1 serving of whole-grain crackers  1 cup of strawberries   6 inch turkey sub sandwich with light mayonnaise,   cup cottage cheese                                                                                                                                                      Black bean and low-fat cheese on a whole wheat tortilla with salsa and light sour cream  Grilled chicken sandwich  Tossed salad with light dressing    Baked potato with 3/4 cup of extra lean ground beef, light shredded cheese and salsa  Fresh fruit                                                 Chicken chunks with lettuce and vegetables stuffed in hernán  Steamed broccoli                                                 3 oz boneless/skinless chicken breast  1/2 cup brown rice with stir-fried vegetables    grapefruit  3 ounces of salmon, trout, or tuna  1 cup  of steamed asparagus  1 small slice whole grain Italian bread  Broiled white or pink fish  3/4 cup whole wheat pasta with tomatoes  3/4 cup of roasted red peppers  3 oz. of extra lean (93/7) hamburger on a [employer] [place] Thins  Tossed salad with light dressing       Black bean or [place] bean soup with grated mozzarella cheese    of a flour tortilla    3 ounces of grilled pork loin with 1 tbsp. of low-sugar barbeque sauce, 1 cup of green beans seasoned with pepper  Small dinner roll or   cup of grapefruit sections    1-2 cups of torn liyah    cup of garbanzo beans or diced skinless chicken breast  5-6 cherry tomatoes  1  tbsp. of crumbled feta cheese  1 tbsp. of roasted soy nuts  1 tsp. of olive oil and 2-3 Tbsp. of balsamic or red wine vinegar  Small whole-wheat dinner roll or   cup of cut up pineapple

## 2025-01-27 NOTE — LETTER
1/27/2025      Nilsa Vazquez  [address] [facility] [address]      Dear Colleague,    Thank you for referring your patient, Nilsa Vazquez, to the [facility] [place]. Please see a copy of my visit note below.    Nilsa Vazquez is a 54 year old who is being evaluated via a billable video visit.        How would you like to obtain your AVS? MyChart  If the video visit is dropped, the invitation should be resent by: Text to cell phone: [phone]  Will anyone else be joining your video visit? No          Medical Weight Loss Initial Diet Evaluation  Assessment:  This patient was referred by Dr. Bell for MNT as treatment for Morbid obesity which is impacting her high cholesterol levels, high blood pressure and pain.  Nilsa is presenting today for a new weight management nutrition consultation. Pt has had an initial appointment with Dr. Bell.    Weight loss medication:  Zepbound  .     Personal Goals: wanting to get back surgery from pinched nerve, BMI must be under 35 for back surgery.      Anthropometrics:      Initial weight: 253 lbs    BMI: There is no height or weight on file to calculate BMI.   Ideal body weight: 57 kg (125 lb 10.6 oz)  Adjusted ideal body weight: 80.1 kg (176 lb 9.6 oz)  Estimated RMR ([place]-St Jeor equation):  1745 kcals x 1.2 (sedentary) = 2095 kcals (for weight maintenance)  1745 kcals x 1.3 (light active) = 2095 kcals (for weight maintenance)    Calorie goal for weight loss: 1500 - 1700 kcal/day    Recommended Protein Intake:  grams of protein/day    Medical History:  Patient Active Problem List   Diagnosis     GLOSSOPHAR NERVE DYSFUNCTION     Essential hypertension, benign     Persistent disorder of initiating or maintaining sleep     CARDIOVASCULAR SCREENING; LDL GOAL LESS THAN 160     Glomus jugulare tumor (H)     Morbid obesity (H)     ASCUS of cervix with negative high risk HPV     Lumbar radiculopathy     "  Diabetes: none  HbA1c:  No results found for: \"HGBA1C\"    Nutrition History:   Food allergies/intolerances/cultural or religous food customs: No     Weight loss history: Patient reports she can go all morning without eating. Her first meal can be at 1pm. Has hx with taking Topamax for migraines.   - Has been journaling her meals over the last week   - dislikes chips or sweets       Vitamins/Mineral Supplementation: Potassium and Magnesium, Calcium, D3+K    Dietary Recall:  Breakfast: skips   Lunch: leftovers OR At home eggs and toast, or bagel. Sometimes leftovers from dinner. In office usually a turkey sandwich, cottage cheese and grapes   Dinner: protein (chicken) with rice and beans Or chili OR chicken fried rice with vegetables OR chicken meatballs with Orzo   Typical Snacks: night time- popcorn  Overnight eating: No  Eating out: 1x per week.    Beverages:   Water  X2 coffee in the am    Exercise: none    Nutrition Diagnosis (PES statement):   Morbid obesity related to excessive energy intake as evidence by BMI of 42.1         Nutrition Intervention  Food and/or Nutrient Delivery   Placed emphasis on importance of developing a healthy meal routine, aiming for 3 meals a day and no snacks.  Discussed using a protein supplemens  Nutrition Education   Discussed with patient how to build a meal: the importance of including a lean/low fat protein at each meal, include a source of vegetables at a minimum of lunch and dinner and limiting carbohydrate intake to 25% per meal.  Educated on sources of lean protein, portion sizes, the amount of grams found in each source. Recommend patient to aim for 20-30g protein at each meal.  Educated on how to read a food label: keeping total fat <10g and sugar <10g per serving.  Discussed the importance of adequate hydration, with emphasis on drinking 64oz of water or zero calorie beverages per day.        Goals established by patient:   Avoid skipping meals- target 5 small meals per " day - 10-15g PRO each  Pair all meals or snack with a fiber source   80oz water goal  Track calories at 7346-9038 kcal per day           Handouts provided:  Intro to MWM    Assessment/Plan:    Pt will follow up in 5 month(s) with bariatrician and 5 month(s) with dietitian.       Video-Visit Details    Type of service:  Video Visit    Video Start Time (time video started): 4:03 p    Video End Time (time video stopped): 4:29p    Originating Location (pt. Location): Home      Distant Location (provider location):  Off-site    Mode of Communication:  Video Conference via [facility]    Physician has received verbal consent for a Video Visit from the patient? Yes      Maria Wilson RD         Again, thank you for allowing me to participate in the care of your patient.        Sincerely,        Maria Wilson RD    Electronically signed

## 2025-01-27 NOTE — PROGRESS NOTES
"Nilsa Vazquez is a 54 year old who is being evaluated via a billable video visit.        How would you like to obtain your AVS? MyChart  If the video visit is dropped, the invitation should be resent by: Text to cell phone: [phone]  Will anyone else be joining your video visit? No          Medical Weight Loss Initial Diet Evaluation  Assessment:  This patient was referred by Dr. Bell for MNT as treatment for Morbid obesity which is impacting her high cholesterol levels, high blood pressure and pain.  Nilsa is presenting today for a new weight management nutrition consultation. Pt has had an initial appointment with Dr. Bell.    Weight loss medication:  Zepbound  .     Personal Goals: wanting to get back surgery from pinched nerve, BMI must be under 35 for back surgery.      Anthropometrics:      Initial weight: 253 lbs    BMI: There is no height or weight on file to calculate BMI.   Ideal body weight: 57 kg (125 lb 10.6 oz)  Adjusted ideal body weight: 80.1 kg (176 lb 9.6 oz)  Estimated RMR ([place]-St Jeor equation):  1745 kcals x 1.2 (sedentary) = 2095 kcals (for weight maintenance)  1745 kcals x 1.3 (light active) = 2095 kcals (for weight maintenance)    Calorie goal for weight loss: 1500 - 1700 kcal/day    Recommended Protein Intake:  grams of protein/day    Medical History:  Patient Active Problem List   Diagnosis    GLOSSOPHAR NERVE DYSFUNCTION    Essential hypertension, benign    Persistent disorder of initiating or maintaining sleep    CARDIOVASCULAR SCREENING; LDL GOAL LESS THAN 160    Glomus jugulare tumor (H)    Morbid obesity (H)    ASCUS of cervix with negative high risk HPV    Lumbar radiculopathy      Diabetes: none  HbA1c:  No results found for: \"HGBA1C\"    Nutrition History:   Food allergies/intolerances/cultural or religous food customs: No     Weight loss history: Patient reports she can go all morning without eating. Her first meal can be at 1pm. Has hx with taking Topamax " for migraines.   - Has been journaling her meals over the last week   - dislikes chips or sweets       Vitamins/Mineral Supplementation: Potassium and Magnesium, Calcium, D3+K    Dietary Recall:  Breakfast: skips   Lunch: leftovers OR At home eggs and toast, or bagel. Sometimes leftovers from dinner. In office usually a turkey sandwich, cottage cheese and grapes   Dinner: protein (chicken) with rice and beans Or chili OR chicken fried rice with vegetables OR chicken meatballs with Orzo   Typical Snacks: night time- popcorn  Overnight eating: No  Eating out: 1x per week.    Beverages:   Water  X2 coffee in the am    Exercise: none    Nutrition Diagnosis (PES statement):   Morbid obesity related to excessive energy intake as evidence by BMI of 42.1         Nutrition Intervention  Food and/or Nutrient Delivery   Placed emphasis on importance of developing a healthy meal routine, aiming for 3 meals a day and no snacks.  Discussed using a protein supplemens  Nutrition Education   Discussed with patient how to build a meal: the importance of including a lean/low fat protein at each meal, include a source of vegetables at a minimum of lunch and dinner and limiting carbohydrate intake to 25% per meal.  Educated on sources of lean protein, portion sizes, the amount of grams found in each source. Recommend patient to aim for 20-30g protein at each meal.  Educated on how to read a food label: keeping total fat <10g and sugar <10g per serving.  Discussed the importance of adequate hydration, with emphasis on drinking 64oz of water or zero calorie beverages per day.        Goals established by patient:   Avoid skipping meals- target 5 small meals per day - 10-15g PRO each  Pair all meals or snack with a fiber source   80oz water goal  Track calories at 0429-0201 kcal per day           Handouts provided:  Intro to MWM    Assessment/Plan:    Pt will follow up in 5 month(s) with bariatrician and 5 month(s) with dietitian.        Video-Visit Details    Type of service:  Video Visit    Video Start Time (time video started): 4:03 p    Video End Time (time video stopped): 4:29p    Originating Location (pt. Location): Home      Distant Location (provider location):  Off-site    Mode of Communication:  Video Conference via [facility]    Physician has received verbal consent for a Video Visit from the patient? Yes      Maria Wilson RD

## 2025-03-01 ENCOUNTER — MYC REFILL (OUTPATIENT)
Dept: FAMILY MEDICINE | Facility: CLINIC | Age: 55
End: 2025-03-01
Payer: COMMERCIAL

## 2025-03-01 DIAGNOSIS — G47.00 PERSISTENT DISORDER OF INITIATING OR MAINTAINING SLEEP: ICD-10-CM

## 2025-03-03 RX ORDER — ZOLPIDEM TARTRATE 5 MG/1
5 TABLET ORAL
Qty: 30 TABLET | Refills: 5 | Status: SHIPPED | OUTPATIENT
Start: 2025-03-03

## 2025-04-21 ENCOUNTER — MYC MEDICAL ADVICE (OUTPATIENT)
Dept: SURGERY | Facility: CLINIC | Age: 55
End: 2025-04-21
Payer: COMMERCIAL

## 2025-04-21 DIAGNOSIS — E66.813 OBESITY, CLASS III, BMI 40-49.9 (MORBID OBESITY) (H): Primary | ICD-10-CM

## 2025-04-29 DIAGNOSIS — M54.16 LUMBAR RADICULOPATHY: ICD-10-CM

## 2025-04-30 RX ORDER — CYCLOBENZAPRINE HCL 10 MG
10 TABLET ORAL 2 TIMES DAILY PRN
Qty: 60 TABLET | Refills: 5 | Status: SHIPPED | OUTPATIENT
Start: 2025-04-30

## 2025-05-19 ENCOUNTER — MYC MEDICAL ADVICE (OUTPATIENT)
Dept: SURGERY | Facility: CLINIC | Age: 55
End: 2025-05-19
Payer: COMMERCIAL

## 2025-05-19 DIAGNOSIS — E66.813 OBESITY, CLASS III, BMI 40-49.9 (MORBID OBESITY) (H): ICD-10-CM

## 2025-05-21 ENCOUNTER — OFFICE VISIT (OUTPATIENT)
Dept: NEUROLOGY | Facility: CLINIC | Age: 55
End: 2025-05-21
Payer: COMMERCIAL

## 2025-05-21 ENCOUNTER — MYC MEDICAL ADVICE (OUTPATIENT)
Dept: NEUROLOGY | Facility: CLINIC | Age: 55
End: 2025-05-21

## 2025-05-21 VITALS
HEART RATE: 87 BPM | DIASTOLIC BLOOD PRESSURE: 82 MMHG | SYSTOLIC BLOOD PRESSURE: 126 MMHG | RESPIRATION RATE: 16 BRPM | OXYGEN SATURATION: 97 %

## 2025-05-21 DIAGNOSIS — M54.16 LUMBAR RADICULOPATHY: ICD-10-CM

## 2025-05-21 DIAGNOSIS — G51.32 HEMIFACIAL SPASM OF LEFT SIDE OF FACE: Primary | ICD-10-CM

## 2025-05-21 RX ORDER — DULOXETIN HYDROCHLORIDE 60 MG/1
60 CAPSULE, DELAYED RELEASE ORAL AT BEDTIME
Qty: 90 CAPSULE | Refills: 3 | Status: SHIPPED | OUTPATIENT
Start: 2025-05-21

## 2025-05-21 ASSESSMENT — PAIN SCALES - GENERAL: PAINLEVEL_OUTOF10: MODERATE PAIN (5)

## 2025-05-21 NOTE — Clinical Note
Could you help get this patient set up with PM&R for hemifacial spasm on the left?  I put a referral in today, but her last referral didn't go through.

## 2025-05-21 NOTE — PROGRESS NOTES
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  For now, she is working on weight loss for meeting criteria for surgery, which also may provide some improvement of itself for the root issues.  Her duloxetine treatment is helpful and should continue, but we discussed serotonergic symptoms and how they could develop over time.  For now, there is no immediate concern her radiculopathy are leading to progressive and new symptoms, which is reassuring overall.      Regarding the hemifacial spasms, the patient is still interested in botox treatment and just wasn't able to make an appointment in the past.  I will put a new referral in for this procedure.    Plan:  - PM&R referral  - Duloxetine 60 mg at bedtime  - Agree with consideration of surgical intervention given positive response to b/l L5-S1 TFESI on 6/14/2024    Follow up in Neurology clinic in 3-6 months, or earlier as needed should new concerns arise.    HELGA Sears D.O.   of Neurology    Total time today (31 min) in this patient encounter was spent on pre-charting, counseling and/or coordination of care. The longitudinal plan of care for the diagnosis(es)/condition(s) as documented were addressed during this visit. Due to the added complexity in care, I will continue to support Nayeli in the subsequent management and with ongoing continuity of care.     Prior History and Update:   The patient was initially seen in neurologic consultation on 10/19/2023 and most recently 7/25/2024 for evaluation of neuropathy or lumbar radiculopathy. Please see the comprehensive neurologic  consultation notes from those dates in the Epic records for details.     Overall impressions and w/up are as follows:  - Serum studies did reveal elevated CRP, ESR as well as monoclonal austen elevations. Rheumatology and hematology referrals were made.  - EMG on 3/11/2024 showed b/l (R>L) S1 radiculopathy that were active.  - At our last visit, the patient was to obtain an MRI lumbar spine, and see PT for S1 radiculopathy stretching/treatment              - MRI Lumbar spine on 4/6/2024 showed L5-S1 concentric disc bulge with moderate to severe left lateral recess stenosis (contact/compression of descending L5-S1 root) as well as a moderate left and right neural foraminal stenosis leading to possible R-L5 nerve root compression at the same level.  - Seen with NSGY 7/2/2024 reporting 1-2 days relief with b/l L5-S1 TFESI (6/14/2024) and worsened pain in both legs. She was recommended to have weight loss management prior to consideration of decompression.     At our last visit, the patient was to add duloxetine to gabapentin to see if this improved some b/l foot pain, otherwise she wanted to continue with pool therapy for conditioning.  Given her history of facial spasms following a left glomus tumor resection, I asked she be seen with PM&R for left hemifacial spasm.    Today, the patient has a reduction of shooting pain in her leg with use of duloxetine.  The patient is having a good deal of weight loss (36 lbs) and is doing this to see if surgery could be considered. There is no clear new symptoms of the legs.  Her legs are still cold and numb overall.       Physical Exam:   General: Seated comfortably in no acute distress.  Neurologic:     Mental Status: Fully alert, attentive and oriented. Speech clear and fluent, no paraphasic errors.     Cranial Nerves: EOMI with normal smooth pursuit. Facial movements symmetric but notable synkinesis and hemifacial spasm on left side. Hearing not formally tested but intact to  conversation.  No dysarthria today.     Motor: No tremors or other abnormal movements observed.   R/L  Upper:  Shoulder abduction, Deltoid (Axillary n), C5,6: 5/5  Elbow flexion, Bicep (Musculocutaneous n), C5,6: 5/5  Elbow extension, Tricep (Radial n), C6,7,8: 5/5  Wrist Flexion, Flexor carpi radialis, (Median), C6, 7: 5/5  Wrist Extension, Extensor carpi radialis longus (Radial), C6,7: 5/5  Finger Flexion, flexor digitorum profundus (median and ulnar), C7,8, T1: 5/5  Finger Flexion, flexor pollicis longus (Anterior interosseus n), C7, 8: 5/5  Finger Extension, Extensor digitorum communis (Posterior interosseus n), C7,8: 5/5  Finger Extension, extensor indicis proprius (radial), C8: 5/5  Finger abduction:   Abductor digiti minimi (ulnar), C8, T1: 5/5   Abductor pollicis brevis (median), C8, T1: 5/5   Lower Extremities  Hip Flexion, Iliopsoas, L1.2: 5/5  Hip Adduction, Adductors, (Obdurator n), L2.3: 5/5  Knee Flexion, Hamstrings (Sciatic n), S1: 5/5  Knee Extension, Quadriceps (Femoral n), L3.4: 5/5  Ankle Dorsiflexion, Tibialis anterior (Deep peroneal n), L4: 5/5  Ankle Plantarflexion, Gastrocnemius, Soleus (Tibial n), S1.2: 5/5  Inversion, in plantar flexed position (posterior tibialis from tibial, L4-5): 5/5  Eversion in dorsiflexed position (anterior tibialis, peroneal, L4-5): 5/5   Sensory: reduced vibration b/l in distal toes and MM, but normal at mid-leg b/l. Light touch normal in toes b/l.    DTR: Biceps, brachioradialis, triceps all 2+ symmetric. Patellar 2+, achilles 1+ b/l. Toes mute.   Cerebellar: FNF normal today   Gait: stands easily, good normal stride and arm swing.

## 2025-05-21 NOTE — PATIENT INSTRUCTIONS
I think given your response to sterid injections at spots where we think your lumbar and sacral nerves are travis pinched, you should consider surgical decompression to relieve the nerve from being pinched.    However, this is not a guarantee in terms of symptom relief.  For now, you should continue with mediation for symptoms relief while losing weight for surgical consideration.    Duloxetine 60 mg at bedtime  PM&R referral for hemifacial spasm

## 2025-05-21 NOTE — NURSING NOTE
Chief Complaint   Patient presents with    RECHECK     Return Neurology            /82 (BP Location: Right arm, Patient Position: Sitting, Cuff Size: Adult Large)   Pulse 87   Resp 16   SpO2 97%       Génesis Devine

## 2025-05-21 NOTE — LETTER
5/21/2025       RE: Nilsa Vazquez  [address] [facility] [address]     Dear Colleague,    Thank you for referring your patient, Nilsa Vazquez, to the [facility] [place] [facility]. Please see a copy of my visit note below.    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  For now, she is working on weight loss for meeting criteria for surgery, which also may provide some improvement of itself for the root issues.  Her duloxetine treatment is helpful and should continue, but we discussed serotonergic symptoms and how they could develop over time.  For now, there is no immediate concern her radiculopathy are leading to progressive and new symptoms, which is reassuring overall.      Regarding the hemifacial spasms, the patient is still interested in botox treatment and just wasn't able to make an appointment in the past.  I will put a new referral in for this procedure.    Plan:  - PM&R referral  - Duloxetine 60 mg at bedtime  - Agree with consideration of surgical intervention given positive response to b/l L5-S1 TFESI on 6/14/2024    Follow up in Neurology clinic in 3-6 months, or earlier as needed should new concerns arise.    HELGA Sears D.O.   of Neurology    Total time today (31 min) in this patient encounter was spent on pre-charting, counseling and/or coordination of care. The longitudinal plan of care for the diagnosis(es)/condition(s) as documented were addressed during this visit. Due to the added complexity in care, I will  continue to support Nayeli in the subsequent management and with ongoing continuity of care.     Prior History and Update:   The patient was initially seen in neurologic consultation on 10/19/2023 and most recently 7/25/2024 for evaluation of neuropathy or lumbar radiculopathy. Please see the comprehensive neurologic consultation notes from those dates in the Epic records for details.     Overall impressions and w/up are as follows:  - Serum studies did reveal elevated CRP, ESR as well as monoclonal austen elevations. Rheumatology and hematology referrals were made.  - EMG on 3/11/2024 showed b/l (R>L) S1 radiculopathy that were active.  - At our last visit, the patient was to obtain an MRI lumbar spine, and see PT for S1 radiculopathy stretching/treatment              - MRI Lumbar spine on 4/6/2024 showed L5-S1 concentric disc bulge with moderate to severe left lateral recess stenosis (contact/compression of descending L5-S1 root) as well as a moderate left and right neural foraminal stenosis leading to possible R-L5 nerve root compression at the same level.  - Seen with NSGY 7/2/2024 reporting 1-2 days relief with b/l L5-S1 TFESI (6/14/2024) and worsened pain in both legs. She was recommended to have weight loss management prior to consideration of decompression.     At our last visit, the patient was to add duloxetine to gabapentin to see if this improved some b/l foot pain, otherwise she wanted to continue with pool therapy for conditioning.  Given her history of facial spasms following a left glomus tumor resection, I asked she be seen with PM&R for left hemifacial spasm.    Today, the patient has a reduction of shooting pain in her leg with use of duloxetine.  The patient is having a good deal of weight loss (36 lbs) and is doing this to see if surgery could be considered. There is no clear new symptoms of the legs.  Her legs are still cold and numb overall.       Physical Exam:   General: Seated comfortably in no  acute distress.  Neurologic:     Mental Status: Fully alert, attentive and oriented. Speech clear and fluent, no paraphasic errors.     Cranial Nerves: EOMI with normal smooth pursuit. Facial movements symmetric but notable synkinesis and hemifacial spasm on left side. Hearing not formally tested but intact to conversation.  No dysarthria today.     Motor: No tremors or other abnormal movements observed.   R/L  Upper:  Shoulder abduction, Deltoid (Axillary n), C5,6: 5/5  Elbow flexion, Bicep (Musculocutaneous n), C5,6: 5/5  Elbow extension, Tricep (Radial n), C6,7,8: 5/5  Wrist Flexion, Flexor carpi radialis, (Median), C6, 7: 5/5  Wrist Extension, Extensor carpi radialis longus (Radial), C6,7: 5/5  Finger Flexion, flexor digitorum profundus (median and ulnar), C7,8, T1: 5/5  Finger Flexion, flexor pollicis longus (Anterior interosseus n), C7, 8: 5/5  Finger Extension, Extensor digitorum communis (Posterior interosseus n), C7,8: 5/5  Finger Extension, extensor indicis proprius (radial), C8: 5/5  Finger abduction:   Abductor digiti minimi (ulnar), C8, T1: 5/5   Abductor pollicis brevis (median), C8, T1: 5/5   Lower Extremities  Hip Flexion, Iliopsoas, L1.2: 5/5  Hip Adduction, Adductors, (Obdurator n), L2.3: 5/5  Knee Flexion, Hamstrings (Sciatic n), S1: 5/5  Knee Extension, Quadriceps (Femoral n), L3.4: 5/5  Ankle Dorsiflexion, Tibialis anterior (Deep peroneal n), L4: 5/5  Ankle Plantarflexion, Gastrocnemius, Soleus (Tibial n), S1.2: 5/5  Inversion, in plantar flexed position (posterior tibialis from tibial, L4-5): 5/5  Eversion in dorsiflexed position (anterior tibialis, peroneal, L4-5): 5/5   Sensory: reduced vibration b/l in distal toes and MM, but normal at mid-leg b/l. Light touch normal in toes b/l.    DTR: Biceps, brachioradialis, triceps all 2+ symmetric. Patellar 2+, achilles 1+ b/l. Toes mute.   Cerebellar: FNF normal today   Gait: stands easily, good normal stride and arm swing.       Again, thank you for  allowing me to participate in the care of your patient.      Sincerely,    Severo Sears, DO

## 2025-05-22 NOTE — TELEPHONE ENCOUNTER
Spoke with pt, she prefers to see Dr Clarke at [facility]. Pt is scheduled for next available apt and placed on a wait list.

## 2025-05-27 ASSESSMENT — HEADACHE IMPACT TEST (HIT 6)
HIT6 TOTAL SCORE: 42
HOW OFTEN DO HEADACHES LIMIT YOUR DAILY ACTIVITIES: RARELY
HOW OFTEN HAVE YOU FELT TOO TIRED TO WORK BECAUSE OF YOUR HEADACHES: NEVER
WHEN YOU HAVE HEADACHES HOW OFTEN IS THE PAIN SEVERE: RARELY
WHEN YOU HAVE A HEADACHE HOW OFTEN DO YOU WISH YOU COULD LIE DOWN: RARELY
HOW OFTEN DID HEADACHS LIMIT CONCENTRATION ON WORK OR DAILY ACTIVITY: NEVER
HOW OFTEN HAVE YOU FELT FED UP OR IRRITATED BECAUSE OF YOUR HEADACHES: NEVER

## 2025-05-27 ASSESSMENT — MIGRAINE DISABILITY ASSESSMENT (MIDAS)
ON A SCALE FROM 0-10 ON AVERAGE HOW PAINFUL WERE HEADACHES: 2
HOW MANY DAYS DID YOU MISS WORK OR SCHOOL BECAUSE OF HEADACHES: 0
HOW MANY DAYS IN THE PAST 3 MONTHS HAVE YOU HAD A HEADACHE: 3
HOW MANY DAYS WAS YOUR PRODUCTIVITY CUT IN HALF BECAUSE OF HEADACHES: 0
HOW MANY DAYS WAS HOUSEWORK PRODUCTIVITY CUT IN HALF DUE TO HEADACHES: 0
HOW OFTEN WERE SOCIAL ACTIVITIES MISSED DUE TO HEADACHES: 0
TOTAL SCORE: 0
HOW MANY DAYS DID YOU NOT DO HOUSEWORK BECAUSE OF HEADACHES: 0

## 2025-05-28 ENCOUNTER — VIRTUAL VISIT (OUTPATIENT)
Dept: NEUROLOGY | Facility: CLINIC | Age: 55
End: 2025-05-28
Payer: COMMERCIAL

## 2025-05-28 DIAGNOSIS — G43.709 CHRONIC MIGRAINE WITHOUT AURA WITHOUT STATUS MIGRAINOSUS, NOT INTRACTABLE: ICD-10-CM

## 2025-05-28 DIAGNOSIS — G43.809 VESTIBULAR MIGRAINE: Primary | ICD-10-CM

## 2025-05-28 RX ORDER — RIZATRIPTAN BENZOATE 5 MG/1
5 TABLET ORAL
Qty: 18 TABLET | Refills: 11 | Status: SHIPPED | OUTPATIENT
Start: 2025-05-28

## 2025-05-28 RX ORDER — TOPIRAMATE 25 MG/1
100 TABLET, FILM COATED ORAL EVERY EVENING
Qty: 120 TABLET | Refills: 5 | Status: SHIPPED | OUTPATIENT
Start: 2025-05-28

## 2025-05-28 NOTE — NURSING NOTE
Current patient location: [address] [place] [id number]    Is the patient currently in the state of [place]? YES    Visit mode: VIDEO    If the visit is dropped, the patient can be reconnected by:TELEPHONE VISIT: Phone number:   Telephone Information:   Mobile [phone]       Will anyone else be joining the visit? NO  (If patient encounters technical issues they should call [phone]    Are changes needed to the allergy or medication list? Pt stated no med changes    Are refills needed on medications prescribed by this physician? NO    Rooming Documentation:  Questionnaire(s) completed    Reason for visit: Headache    Coby Meade VVF

## 2025-05-28 NOTE — PROGRESS NOTES
Virtual Visit Details    Type of service:  Video Visit     Originating Location (pt. 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  She also had 8 months of episodic vertigo, also associated with photophobia, photophobia, as well as nausea.  Symptoms were well managed with topiramate 75 mg nightly, and she was able to taper off last year. Symptoms returned 1 week ago. We discussed restarting topiramate and monitoring.      Going forward, she will continue symptomatic treatment for chronic migraine and possible vestibular migraine.   -For acute treatment of mild headache, I recommend Excedrin as needed, not to exceed more than 9 days/month to avoid medication overuse.  - For acute treatment of headache not responsive to Excedrin, I recommend rizatriptan 5 mg taken at the onset of headache, with a repeat dose in 2 hours if needed.  This should not exceed more than 9 days/month to avoid medication overuse.      Her symptom frequency and severity remain improved.   -I recommend she restart topiramate 25 mg nightly, increasing by 25 mg as needed and tolerated to max of 100 mg nightly.      -Alternatives in the future could include antihypertensive such as beta-blockers or calcium channel blockers if okay with her primary care physician in combination with her other antihypertensives, amitriptyline, botulinum toxin injections, or a CGRP monoclonal antibody.     The longitudinal plan of care for Nayeli was addressed during this visit. Due to the added complexity in care, I will continue to support Nayeli in  the subsequent management of this condition(s) and with the ongoing continuity of care of this condition(s). Return in 1 year, sooner if needed.    Carol Ann Lai MD  Neurology     Subjective:    Nilsa Vazquez returns for follow up of vertigo migraine.    She was able to taper off of topiramate after our last visit.    She had a few days of dizziness lately. Hasn't had this for a long time prior.     Worse with sitting to standing, getting up in the morning.    Left sided deficits are stable.     Denies recent illness or allergies.    Plan for PM&R for facial spasm left, Botox.    Objective:    Vitals: There were no vitals taken for this visit.  General: Cooperative, NAD  Neurologic:  Mental Status: Fully alert, attentive and oriented. Speech clear and fluent.   Cranial Nerves: Facial movements symmetric.   Motor: No abnormal movements.      Pertinent Investigations:          5/21/2023     4:48 PM 5/17/2024     3:57 PM 5/27/2025     3:57 PM   HIT-6   When you have headaches, how often is the pain severe 8 10 8   How often do headaches limit your ability to do usual daily activities including household work, work, school, or social activities? 8 10 8   When you have a headache, how often do you wish you could lie down? 8 10 8   In the past 4 weeks, how often have you felt too tired to do work or daily activities because of your headaches 8 8 6   In the past 4 weeks, how often have you felt fed up or irritated because of your headaches 8 6 6   In the past 4 weeks, how often did headaches limit your ability to concentrate on work or daily activities 8 8 6   HIT-6 Total Score 48 52 42        Patient-reported           5/21/2023     4:51 PM 5/17/2024     4:01 PM 5/27/2025     3:58 PM   MIDAS - in the past three months:   On how many days did you miss work or school because of your headaches? 2 0 0   How many days was your productivity at work or school reduced by half or more because of your headaches? 3 1 0   On how  many days did you not do household work because of your headaches? 3 2 0   How many days was your productivity in household work reduced by half or more because of your headaches? 3 2 0   On how many days did you miss family, social, or leisure activities because of your headaches? 3 0 0   On how many days did you have a headache? 6 10 3   On a scale of 0-10, on average how painful were these headaches?  4 2   MIDAS Score 14 (III - Moderate Disability) 5 (I - Little or No Disability) 0 (I - Little or No Disability)

## 2025-05-28 NOTE — LETTER
5/28/2025       RE: Nilsa Vazquez  [address] [facility] [address]     Dear Colleague,    Thank you for referring your patient, Nilsa Vazquez, to the [facility] [place] at [facility]. Please see a copy of my visit note below.    Virtual Visit Details    Type of service:  Video Visit     Originating Location (pt. Location): Home    Distant Location (provider location):  Off-site  Platform used for Video Visit: [facility]    Headache Neurology Progress Note  May 28, 2025      Assessment/Plan:   Nilsa Vazquez is a 54 year old woman who returns for follow-up of severe posterior headaches associated with photophobia, photophobia, in the setting of left glomus tumor status post resection years ago, with some residual tumor seen on imaging.  She also had 8 months of episodic vertigo, also associated with photophobia, photophobia, as well as nausea.  Symptoms were well managed with topiramate 75 mg nightly, and she was able to taper off last year. Symptoms returned 1 week ago. We discussed restarting topiramate and monitoring.      Going forward, she will continue symptomatic treatment for chronic migraine and possible vestibular migraine.   -For acute treatment of mild headache, I recommend Excedrin as needed, not to exceed more than 9 days/month to avoid medication overuse.  - For acute treatment of headache not responsive to Excedrin, I recommend rizatriptan 5 mg taken at the onset of headache, with a repeat dose in 2 hours if needed.  This should not exceed more than 9 days/month to avoid medication overuse.      Her symptom frequency and severity remain improved.   -I recommend she restart topiramate 25 mg nightly, increasing by 25 mg as needed and tolerated to max of 100 mg nightly.      -Alternatives in the future could include antihypertensive such as beta-blockers or calcium channel  blockers if okay with her primary care physician in combination with her other antihypertensives, amitriptyline, botulinum toxin injections, or a CGRP monoclonal antibody.     The longitudinal plan of care for Nayeli was addressed during this visit. Due to the added complexity in care, I will continue to support Nayeli in the subsequent management of this condition(s) and with the ongoing continuity of care of this condition(s). Return in 1 year, sooner if needed.    Carol Ann Lai MD  Neurology     Subjective:    Nilsa Vazquez returns for follow up of vertigo migraine.    She was able to taper off of topiramate after our last visit.    She had a few days of dizziness lately. Hasn't had this for a long time prior.     Worse with sitting to standing, getting up in the morning.    Left sided deficits are stable.     Denies recent illness or allergies.    Plan for PM&R for facial spasm left, Botox.    Objective:    Vitals: There were no vitals taken for this visit.  General: Cooperative, NAD  Neurologic:  Mental Status: Fully alert, attentive and oriented. Speech clear and fluent.   Cranial Nerves: Facial movements symmetric.   Motor: No abnormal movements.      Pertinent Investigations:          5/21/2023     4:48 PM 5/17/2024     3:57 PM 5/27/2025     3:57 PM   HIT-6   When you have headaches, how often is the pain severe 8 10 8   How often do headaches limit your ability to do usual daily activities including household work, work, school, or social activities? 8 10 8   When you have a headache, how often do you wish you could lie down? 8 10 8   In the past 4 weeks, how often have you felt too tired to do work or daily activities because of your headaches 8 8 6   In the past 4 weeks, how often have you felt fed up or irritated because of your headaches 8 6 6   In the past 4 weeks, how often did headaches limit your ability to concentrate on work or daily activities 8 8 6   HIT-6 Total Score 48 52 42         Patient-reported           5/21/2023     4:51 PM 5/17/2024     4:01 PM 5/27/2025     3:58 PM   MIDAS - in the past three months:   On how many days did you miss work or school because of your headaches? 2 0 0   How many days was your productivity at work or school reduced by half or more because of your headaches? 3 1 0   On how many days did you not do household work because of your headaches? 3 2 0   How many days was your productivity in household work reduced by half or more because of your headaches? 3 2 0   On how many days did you miss family, social, or leisure activities because of your headaches? 3 0 0   On how many days did you have a headache? 6 10 3   On a scale of 0-10, on average how painful were these headaches?  4 2   MIDAS Score 14 (III - Moderate Disability) 5 (I - Little or No Disability) 0 (I - Little or No Disability)          Again, thank you for allowing me to participate in the care of your patient.      Sincerely,    Carol Ann Lai MD

## 2025-06-17 NOTE — PROGRESS NOTES
Virtual Visit Details    Type of service:  Video Visit     Originating Location (pt. Location): Home    Distant Location (provider location):  Off-site  Platform used for Video Visit: [employer]  Video start time: 10:14 am  Video end time: 10:25 am     Bariatric Care Clinic Non Surgical Follow up Visit   Date of visit: 6/18/2025  Physician: NIMISHA Bell MD, MD  Primary Care is Claudia Mitchell.  Nilsa Vazquez   54 year old  female     Initial Weight: 253#  Initial BMI: 42.1  Today's Weight:   Wt Readings from Last 1 Encounters:   06/18/25 101 kg (222 lb 11.2 oz)     Body mass index is 37.06 kg/m .           Assessment and Plan   Assessment: Nilsa is a 54 year old year old female who presents for medical weight management.      Plan:    1. Morbid obesity (H) (Primary)  Patient was congratulated on her success thus far. Healthy habits to assist with further weight loss were discussed. She is generally on track! She will work on exercising as tolerated. She will continue the zepbound 10 mg weekly. We discussed the patient's co-morbid conditions including hypertension and dyslipidemia. These likely will improve with healthy habits and weight loss.     2. Essential hypertension, benign  This may improve with healthy habits and weight loss.     3. Dyslipidemia  This may improve with healthy habits and weight loss.      Follow up next available with myself            INTERIM HISTORY  Patient is taking zepbound for appetite and craving control and she thinks it is working.     DIETARY HISTORY  Meals Per Day: 3  Eating Protein First?: usually  Food Diary: B:kashi breakfast cereal L:chicken and salad D:meat and salad and sometimes starch  Typical Snack: grapes  Fluid Intake: 64 oz  Portion Control: yes  Calorie Containing Beverages: none  Meals at Restaurant per week:1, keeping portions small    Positive Changes Since Last Visit: protein first, portion control, water intake  Struggling With: back pain, fatigue,  "exercise    Knowledgeable in Reading Food Labels: yes  Getting Adequate Protein: yes      PHYSICAL ACTIVITY PATTERNS:  Some sitting exercise, some hand weights with her arms    REVIEW OF SYSTEMS  GENERAL/CONSTITUTIONAL:  Fatigue: yes  HEENT:   glaucoma: no  CARDIOVASCULAR:  History of heart disease: no  GI:  Pancreatitis: no  NEUROLOGIC:  Paresthesias: no  History of migraine headaches: history of  PSYCHIATRIC:  Moods: stable  MUSCULOSKELETAL/RHEUMATOLOGIC  Arthralgias: yes  Myalgias: yes  ENDOCRINE:  Monitoring Blood Sugars: no  Sugars Well Controlled: na  No personal or family history of medullary thyroid cancer no  No personal or family history of MEN2   :  Birth control: menopause  History of kidney stones: no     Patient Profile   Social History     Social History Narrative    Not on file        Past Medical History   Past Medical History:   Diagnosis Date    Abnormal Pap smear of cervix 2019    see problem list    Hypertension     Paraganglioma (H) 2007     Patient Active Problem List   Diagnosis    GLOSSOPHAR NERVE DYSFUNCTION    Essential hypertension, benign    Persistent disorder of initiating or maintaining sleep    CARDIOVASCULAR SCREENING; LDL GOAL LESS THAN 160    Glomus jugulare tumor (H)    Morbid obesity (H)    ASCUS of cervix with negative high risk HPV    Lumbar radiculopathy       Past Surgical History  She has a past surgical history that includes surgical history of -  (1992 approx); vascular surgery; orthopedic surgery; craniotomy (N/A, 2007); ENT surgery (Feb 2007); Head and neck surgery (Feb 2007); Cystoscopy, Sling Transvaginal (N/A, 10/17/2022); and colonoscopy (2022).     Examination   Ht 1.651 m (5' 5\")   Wt 101 kg (222 lb 11.2 oz)   BMI 37.06 kg/m    Wt Readings from Last 4 Encounters:   06/18/25 101 kg (222 lb 11.2 oz)   01/20/25 114.8 kg (253 lb)   09/03/24 114.8 kg (253 lb)   07/25/24 114.8 kg (253 lb)      BP Readings from Last 3 Encounters:   05/21/25 126/82   09/03/24 136/86 "   07/02/24 (!) 191/76      GENERAL: alert and no distress  EYES: Eyes grossly normal to inspection.  No discharge or erythema, or obvious scleral/conjunctival abnormalities.  RESP: No audible wheeze, cough, or visible cyanosis.    SKIN: Visible skin clear. No significant rash, abnormal pigmentation or lesions.  NEURO: Cranial nerves grossly intact.  Mentation and speech appropriate for age.  PSYCH: Appropriate affect, tone, and pace of words        Counseling:   We reviewed the important post op bariatric recommendations:  -eating 3 meals daily  -eating protein first, getting >60gm protein daily  -eating slowly, chewing food well  -avoiding/limiting calorie containing beverages  -limiting starchy vegetables and carbohydrates, choosing wheat, not white with breads,   crackers, pastas, hernán, bagels, tortillas, rice  -limiting restaurant or cafeteria eating to twice a week or less    We discussed the importance of restorative sleep and stress management in maintaining a healthy weight.  We discussed the National Weight Control Registry healthy weight maintenance strategies and ways to optimize metabolism.  We discussed the importance of physical activity including cardiovascular and strength training in maintaining a healthier weight.    Total time spent on the date of this encounter doing: chart review, review of test results, patient visit, physical exam, education, counseling, developing plan of care and documenting = 33 minutes.         NIMISHA Bell MD  [facility]

## 2025-06-18 ENCOUNTER — VIRTUAL VISIT (OUTPATIENT)
Dept: SURGERY | Facility: CLINIC | Age: 55
End: 2025-06-18
Payer: COMMERCIAL

## 2025-06-18 VITALS — WEIGHT: 222.7 LBS | HEIGHT: 65 IN | BODY MASS INDEX: 37.1 KG/M2

## 2025-06-18 DIAGNOSIS — E78.5 DYSLIPIDEMIA: ICD-10-CM

## 2025-06-18 DIAGNOSIS — E66.813 OBESITY, CLASS III, BMI 40-49.9 (MORBID OBESITY) (H): ICD-10-CM

## 2025-06-18 DIAGNOSIS — E66.01 MORBID OBESITY (H): Primary | ICD-10-CM

## 2025-06-18 DIAGNOSIS — I10 ESSENTIAL HYPERTENSION, BENIGN: ICD-10-CM

## 2025-06-18 PROCEDURE — 98006 SYNCH AUDIO-VIDEO EST MOD 30: CPT | Performed by: FAMILY MEDICINE

## 2025-06-18 PROCEDURE — 1126F AMNT PAIN NOTED NONE PRSNT: CPT | Mod: 95 | Performed by: FAMILY MEDICINE

## 2025-06-18 ASSESSMENT — PAIN SCALES - GENERAL: PAINLEVEL_OUTOF10: NO PAIN (0)

## 2025-06-18 NOTE — PATIENT INSTRUCTIONS

## 2025-06-18 NOTE — NURSING NOTE
Is the patient currently in the state of [place]? yes    Location: home    Visit mode:VIDEO    If the visit is dropped, the patient can be reconnected by: VIDEO VISIT: Text to cell phone:   Telephone Information:   Mobile [phone]    and VIDEO VISIT: Send to e-mail at: [email or web address]    Will anyone else be joining the visit? NO  (If patient encounters technical issues they should call [phone])    Are changes needed to the allergy or medication list? No    Are refills needed on medications prescribed by this physician? NO    Reason for visit: FRANCE Delgado, Virtual Visit Facilitator    QNR Status: completed

## 2025-06-18 NOTE — LETTER
6/18/2025      Nilsa Vazquez  [address] [place] [id number]      Dear Colleague,    Thank you for referring your patient, Nlisa Vazquez, to the [facility] [place]. Please see a copy of my visit note below.    Virtual Visit Details    Type of service:  Video Visit     Originating Location (pt. Location): Home    Distant Location (provider location):  Off-site  Platform used for Video Visit: [employer]  Video start time: 10:14 am  Video end time: 10:25 am     Bariatric Care Clinic Non Surgical Follow up Visit   Date of visit: 6/18/2025  Physician: NIMISHA Bell MD, MD  Primary Care is Claudia Mitchell.  Nilsa Vazquez   54 year old  female     Initial Weight: 253#  Initial BMI: 42.1  Today's Weight:   Wt Readings from Last 1 Encounters:   06/18/25 101 kg (222 lb 11.2 oz)     Body mass index is 37.06 kg/m .           Assessment and Plan   Assessment: Nilsa is a 54 year old year old female who presents for medical weight management.      Plan:    1. Morbid obesity (H) (Primary)  Patient was congratulated on her success thus far. Healthy habits to assist with further weight loss were discussed. She is generally on track! She will work on exercising as tolerated. She will continue the zepbound 10 mg weekly. We discussed the patient's co-morbid conditions including hypertension and dyslipidemia. These likely will improve with healthy habits and weight loss.     2. Essential hypertension, benign  This may improve with healthy habits and weight loss.     3. Dyslipidemia  This may improve with healthy habits and weight loss.      Follow up next available with myself            INTERIM HISTORY  Patient is taking zepbound for appetite and craving control and she thinks it is working.     DIETARY HISTORY  Meals Per Day: 3  Eating Protein First?: usually  Food Diary: B:kashi breakfast cereal L:chicken and salad D:meat and salad and sometimes starch  Typical  "Snack: grapes  Fluid Intake: 64 oz  Portion Control: yes  Calorie Containing Beverages: none  Meals at Restaurant per week:1, keeping portions small    Positive Changes Since Last Visit: protein first, portion control, water intake  Struggling With: back pain, fatigue, exercise    Knowledgeable in Reading Food Labels: yes  Getting Adequate Protein: yes      PHYSICAL ACTIVITY PATTERNS:  Some sitting exercise, some hand weights with her arms    REVIEW OF SYSTEMS  GENERAL/CONSTITUTIONAL:  Fatigue: yes  HEENT:   glaucoma: no  CARDIOVASCULAR:  History of heart disease: no  GI:  Pancreatitis: no  NEUROLOGIC:  Paresthesias: no  History of migraine headaches: history of  PSYCHIATRIC:  Moods: stable  MUSCULOSKELETAL/RHEUMATOLOGIC  Arthralgias: yes  Myalgias: yes  ENDOCRINE:  Monitoring Blood Sugars: no  Sugars Well Controlled: na  No personal or family history of medullary thyroid cancer no  No personal or family history of MEN2   :  Birth control: menopause  History of kidney stones: no     Patient Profile   Social History     Social History Narrative     Not on file        Past Medical History   Past Medical History:   Diagnosis Date     Abnormal Pap smear of cervix 2019    see problem list     Hypertension      Paraganglioma (H) 2007     Patient Active Problem List   Diagnosis     GLOSSOPHAR NERVE DYSFUNCTION     Essential hypertension, benign     Persistent disorder of initiating or maintaining sleep     CARDIOVASCULAR SCREENING; LDL GOAL LESS THAN 160     Glomus jugulare tumor (H)     Morbid obesity (H)     ASCUS of cervix with negative high risk HPV     Lumbar radiculopathy       Past Surgical History  She has a past surgical history that includes surgical history of -  (1992 approx); vascular surgery; orthopedic surgery; craniotomy (N/A, 2007); ENT surgery (Feb 2007); Head and neck surgery (Feb 2007); Cystoscopy, Sling Transvaginal (N/A, 10/17/2022); and colonoscopy (2022).     Examination   Ht 1.651 m (5' 5\")   Wt " 101 kg (222 lb 11.2 oz)   BMI 37.06 kg/m    Wt Readings from Last 4 Encounters:   06/18/25 101 kg (222 lb 11.2 oz)   01/20/25 114.8 kg (253 lb)   09/03/24 114.8 kg (253 lb)   07/25/24 114.8 kg (253 lb)      BP Readings from Last 3 Encounters:   05/21/25 126/82   09/03/24 136/86   07/02/24 (!) 191/76      GENERAL: alert and no distress  EYES: Eyes grossly normal to inspection.  No discharge or erythema, or obvious scleral/conjunctival abnormalities.  RESP: No audible wheeze, cough, or visible cyanosis.    SKIN: Visible skin clear. No significant rash, abnormal pigmentation or lesions.  NEURO: Cranial nerves grossly intact.  Mentation and speech appropriate for age.  PSYCH: Appropriate affect, tone, and pace of words        Counseling:   We reviewed the important post op bariatric recommendations:  -eating 3 meals daily  -eating protein first, getting >60gm protein daily  -eating slowly, chewing food well  -avoiding/limiting calorie containing beverages  -limiting starchy vegetables and carbohydrates, choosing wheat, not white with breads,   crackers, pastas, hernán, bagels, tortillas, rice  -limiting restaurant or cafeteria eating to twice a week or less    We discussed the importance of restorative sleep and stress management in maintaining a healthy weight.  We discussed the National Weight Control Registry healthy weight maintenance strategies and ways to optimize metabolism.  We discussed the importance of physical activity including cardiovascular and strength training in maintaining a healthier weight.    Total time spent on the date of this encounter doing: chart review, review of test results, patient visit, physical exam, education, counseling, developing plan of care and documenting = 33 minutes.         NIMISHA Bell MD  [facility]             Again, thank you for allowing me to participate in the care of your patient.        Sincerely,        NIMISHA Bell MD    Electronically  signed

## 2025-06-24 ENCOUNTER — VIRTUAL VISIT (OUTPATIENT)
Dept: SURGERY | Facility: CLINIC | Age: 55
End: 2025-06-24
Payer: COMMERCIAL

## 2025-06-24 DIAGNOSIS — E66.9 OBESITY (BMI 30-39.9): Primary | ICD-10-CM

## 2025-06-24 PROCEDURE — 97803 MED NUTRITION INDIV SUBSEQ: CPT | Mod: 95

## 2025-06-24 NOTE — PROGRESS NOTES
"Nilsa Vazquez is a 54 year old who is being evaluated via a billable video visit.          How would you like to obtain your AVS? MyChart  If the video visit is dropped, the invitation should be resent by: Text to cell phone: [phone]  Will anyone else be joining your video visit? No            Medical  Weight Loss Follow-Up Diet Evaluation  Assessment:  Nilsa is presenting today for a follow up weight management nutrition consultation.  This patient has had an initial appointment and was referred by Dr. Bell for MNT as treatment for Obesity which is impacting her high cholesterol levels, high blood pressure and pain.   Weight loss medication: Zepbound.   Personal Goals: wanting to get back surgery from pinched nerve, BMI must be under 35 for back surgery.    Pt's weight is 222 lbs   Initial weight: 253 lbs   Weight change: 29 lbs down.         6/15/2025    12:15 PM   Changes and Difficulties   I have made the following changes to my diet since my last visit: Prioritizing protein, lower calories   With regards to my diet, I am still struggling with: Meal planning with family   I have made the following changes to my activity/exercise since my last visit: Added some chair exercises and small weights for send   With regards to my activity/exercise, I am still struggling with: Walking     Estimated body mass index is 37.06 kg/m  as calculated from the following:    Height as of 6/18/25: 1.651 m (5' 5\").    Weight as of 6/18/25: 101 kg (222 lb 11.2 oz).    Ideal body weight: 57 kg (125 lb 10.6 oz)  Adjusted ideal body weight: 74.6 kg (164 lb 7.6 oz)    Estimated RMR (Cross-St Jeor equation):   1605 kcals x 1.2 (sedentary) = 1925 kcals (for weight maintenance)  1605 kcals x 1.3 (light active) = 2210 kcals (for weight maintenance)  Calorie goal for weight loss: 0354-7877 kcal/day  Recommended Protein Intake:  grams of protein/day  Patient Active Problem List:  Patient Active Problem List   Diagnosis    " GLOSSOPHAR NERVE DYSFUNCTION    Essential hypertension, benign    Persistent disorder of initiating or maintaining sleep    CARDIOVASCULAR SCREENING; LDL GOAL LESS THAN 160    Glomus jugulare tumor (H)    Morbid obesity (H)    ASCUS of cervix with negative high risk HPV    Lumbar radiculopathy     Diabetes: none    Progress on goals from last visit: Patient reports she is getting in three meals per day. Noted she continues to focus on protein intake. Water intake is going great. No routine physical activity goals at this time d/t back limitations.    Dietary Recall:  Breakfast: koshi breakfast cereal with added protein and higher fiber with fairlife milk with a side of fruit occasionally Or greek yogurt with granola   Lunch: salad with added chicken (chopped or rotisserie)  Dinner: will eat what the family is eating with emphasis on meat and vegetables   Typical Snacks: grapes   Overnight eating: No  Eating out: 1x per week.  Beverages:   Water  X2 coffee in the am  Exercise: no routine - has been implementing weights here and there     Nutrition Diagnosis:    Obesity related to excessive energy intake as evidence by BMI of 37.06        Intervention:  Food and/or nutrient delivery: consistency with meals, adequate protein , fiber and carbohydrates.  Nutrition education: none  Nutrition counseling: goal setting and continued support   Coordination of nutrition care: n/a    Monitoring/Evaluation:    Goals:  Continue with previous goals set  Stay moving, weight resistance training is and excellent habit to incorporate daily    Patient to follow up in 5 month(s) with bariatrician and 6 month(s) with RD          Video-Visit Details    Type of service:  Video Visit    Video Start Time (time video started): 3:30 pm    Video End Time (time video stopped): 3:44 pm    Originating Location (pt. Location): Home      Distant Location (provider location):  Off-site    Mode of Communication:  Video Conference via  [facility]    Physician has received verbal consent for a Video Visit from the patient? Yes      Maria Wilson RD

## 2025-06-24 NOTE — LETTER
"6/24/2025      Nilsa Vazquez  [address] [facility] [id number]      Dear Colleague,    Thank you for referring your patient, Nilsa Vazquez, to the [facility] [place]. Please see a copy of my visit note below.    Nilsa Vazquez is a 54 year old who is being evaluated via a billable video visit.          How would you like to obtain your AVS? MyChart  If the video visit is dropped, the invitation should be resent by: Text to cell phone: [phone]  Will anyone else be joining your video visit? No            Medical  Weight Loss Follow-Up Diet Evaluation  Assessment:  Nilsa is presenting today for a follow up weight management nutrition consultation.  This patient has had an initial appointment and was referred by Dr. Bell for MNT as treatment for Obesity which is impacting her high cholesterol levels, high blood pressure and pain.   Weight loss medication: Zepbound.   Personal Goals: wanting to get back surgery from pinched nerve, BMI must be under 35 for back surgery.    Pt's weight is 222 lbs   Initial weight: 253 lbs   Weight change: 29 lbs down.         6/15/2025    12:15 PM   Changes and Difficulties   I have made the following changes to my diet since my last visit: Prioritizing protein, lower calories   With regards to my diet, I am still struggling with: Meal planning with family   I have made the following changes to my activity/exercise since my last visit: Added some chair exercises and small weights for send   With regards to my activity/exercise, I am still struggling with: Walking     Estimated body mass index is 37.06 kg/m  as calculated from the following:    Height as of 6/18/25: 1.651 m (5' 5\").    Weight as of 6/18/25: 101 kg (222 lb 11.2 oz).    Ideal body weight: 57 kg (125 lb 10.6 oz)  Adjusted ideal body weight: 74.6 kg (164 lb 7.6 oz)    Estimated RMR (Waucoma-St Jeor equation):   1605 kcals x 1.2 (sedentary) = 1925 kcals (for " weight maintenance)  1605 kcals x 1.3 (light active) = 2210 kcals (for weight maintenance)  Calorie goal for weight loss: 7486-0141 kcal/day  Recommended Protein Intake:  grams of protein/day  Patient Active Problem List:  Patient Active Problem List   Diagnosis     GLOSSOPHAR NERVE DYSFUNCTION     Essential hypertension, benign     Persistent disorder of initiating or maintaining sleep     CARDIOVASCULAR SCREENING; LDL GOAL LESS THAN 160     Glomus jugulare tumor (H)     Morbid obesity (H)     ASCUS of cervix with negative high risk HPV     Lumbar radiculopathy     Diabetes: none    Progress on goals from last visit: Patient reports she is getting in three meals per day. Noted she continues to focus on protein intake. Water intake is going great. No routine physical activity goals at this time d/t back limitations.    Dietary Recall:  Breakfast: koshi breakfast cereal with added protein and higher fiber with fairlife milk with a side of fruit occasionally Or greek yogurt with granola   Lunch: salad with added chicken (chopped or rotisserie)  Dinner: will eat what the family is eating with emphasis on meat and vegetables   Typical Snacks: grapes   Overnight eating: No  Eating out: 1x per week.  Beverages:   Water  X2 coffee in the am  Exercise: no routine - has been implementing weights here and there     Nutrition Diagnosis:    Obesity related to excessive energy intake as evidence by BMI of 37.06        Intervention:  Food and/or nutrient delivery: consistency with meals, adequate protein , fiber and carbohydrates.  Nutrition education: none  Nutrition counseling: goal setting and continued support   Coordination of nutrition care: n/a    Monitoring/Evaluation:    Goals:  Continue with previous goals set  Stay moving, weight resistance training is and excellent habit to incorporate daily    Patient to follow up in 5 month(s) with bariatrician and 6 month(s) with RD          Video-Visit Details    Type of  service:  Video Visit    Video Start Time (time video started): 3:30 pm    Video End Time (time video stopped): 3:44 pm    Originating Location (pt. Location): Home      Distant Location (provider location):  Off-site    Mode of Communication:  Video Conference via [facility]    Physician has received verbal consent for a Video Visit from the patient? Yes      Maria Wilson RD         Again, thank you for allowing me to participate in the care of your patient.        Sincerely,        Maria Wilson RD    Electronically signed

## 2025-06-24 NOTE — PATIENT INSTRUCTIONS
Quick and Easy Meals    Salad kit with rotisserie chicken, eggs, lunch meat, beans or tuna    Chicken nuggets on top of Caesar salad kit     Lunch meat sandwich or wrap with veggies (sugar snap peas, bell pepper slices, carrot sticks, celery, sliced cucumber, cherry tomatoes, etc.)    Greek or light yogurt with a handful of nuts and fruit    Cottage cheese, cherry tomatoes and Triscuit crackers    Refried bean and cheese quesadilla on whole wheat tortilla    Can of Progresso Light or [place] Well Yes soup - add additional leftover protein like chicken to boost protein    [place] cakes pancake or waffles with peanut butter or berries    Baked sweet potato with black beans and salsa and a side salad    Adult lunchable: lunch meat, string cheese, crackers and veggies    Tuna Cucumber Boats: cut cucumber in half, scoop out cucumber seeds, fill with mixture of tuna, light bai, tigist mustard, red onion, banana peppers, dried dill, salt and pepper    Protein pasta salad: whole wheat or bean pasta with chicken sausage, broccoli and italian dressing    Egg sandwich on english muffin: egg, slice of cheese and piece of ham    Grilled cheese and turkey sandwich with a side salad or cup of soup    BBQ Flatbread: Flat Out high protein flatbread with rotisserie chicken, BBQ sauce and red onion, topped with mozzarella cheese and baked in the oven    Angelina Chicken Wrap: Rotisserie chicken warmed up with Ritchie's Hot Sauce in a medium size tortilla with light ranch dressing. Add mixed greens, red onion, diced tomato, 2% shredded cheddar cheese.    Cottage cheese Pizza Bowl - mix cottage cheese, marinara, mozzarella cheese, turkey pepperonis, italian seasoning, onions, bell peppers, heated up, serve with bell peppers and/or crackers    Cottage cheese Taco Bowl - mix cottage cheese, ground beef with taco seasoning, shredded cheese, lettuce, tomato, heated up    Greek cottage cheese bowl - mix cottage cheese, dill, vegetables  (cucumber, bell pepper, cucumber, cherry tomatoes), salt and pepper, olives, feta, a little drizzle of olive oil     Whole wheat or bean pasta with pesto and chicken sausage     Sheet Pan Dinner: Chicken sausage, herbed baby potatoes, asparagus, carrots, and onions roasted in olive oil    Omelet with chicken or beans, low-fat cheese, veggies (bell pepper, tomato, spinach, mushroom, onions), and salsa    Whole wheat toast topped with mashed avocado, sliced tomato, and a fried egg     Berry Salad: Spinach/lettuce, berries, grilled chicken/salmon/tofu, low-fat feta cheese, with vinaigrette dressing     [place] Salad: Mixed greens, chicken breast, black beans, corn, diced tomatoes, green onions, cheddar cheese, cilantro, crushed tortilla chips, and a dressing of light ranch mixed with taco seasoning    Sweet potato, bell pepper, chickpeas, onion, and tomato sauteed in light oil with spices of choice (paprika, cumin, gary, garlic, cayenne, black pepper)    Cranberry Apple Quinoa Salad: quinoa, grilled chicken, diced apple, celery, green onion, unsweetened dried cranberries, and chopped pecans, with a dressing of olive oil, tigist mustard, and honey    Asian Cabbage Salad: Sliced green and red cabbage, sliced bell pepper, edamame, shredded carrots, cilantro, slivered almonds, and grilled chicken or tofu, with gary peanut dressing

## 2025-08-31 SDOH — HEALTH STABILITY: PHYSICAL HEALTH: ON AVERAGE, HOW MANY MINUTES DO YOU ENGAGE IN EXERCISE AT THIS LEVEL?: 0 MIN

## 2025-08-31 SDOH — HEALTH STABILITY: PHYSICAL HEALTH: ON AVERAGE, HOW MANY DAYS PER WEEK DO YOU ENGAGE IN MODERATE TO STRENUOUS EXERCISE (LIKE A BRISK WALK)?: 0 DAYS

## 2025-09-03 ENCOUNTER — OFFICE VISIT (OUTPATIENT)
Dept: FAMILY MEDICINE | Facility: CLINIC | Age: 55
End: 2025-09-03
Attending: FAMILY MEDICINE
Payer: COMMERCIAL

## 2025-09-03 VITALS
OXYGEN SATURATION: 99 % | HEART RATE: 77 BPM | SYSTOLIC BLOOD PRESSURE: 110 MMHG | RESPIRATION RATE: 18 BRPM | HEIGHT: 65 IN | WEIGHT: 215.4 LBS | DIASTOLIC BLOOD PRESSURE: 76 MMHG | BODY MASS INDEX: 35.89 KG/M2

## 2025-09-03 DIAGNOSIS — Z00.00 ROUTINE GENERAL MEDICAL EXAMINATION AT A HEALTH CARE FACILITY: ICD-10-CM

## 2025-09-03 DIAGNOSIS — G47.00 PERSISTENT DISORDER OF INITIATING OR MAINTAINING SLEEP: ICD-10-CM

## 2025-09-03 DIAGNOSIS — D44.7 GLOMUS JUGULARE TUMOR (H): ICD-10-CM

## 2025-09-03 DIAGNOSIS — I10 ESSENTIAL HYPERTENSION, BENIGN: Primary | ICD-10-CM

## 2025-09-03 LAB
ALBUMIN SERPL BCG-MCNC: 4.2 G/DL (ref 3.5–5.2)
ALP SERPL-CCNC: 135 U/L (ref 40–150)
ALT SERPL W P-5'-P-CCNC: 28 U/L (ref 0–50)
ANION GAP SERPL CALCULATED.3IONS-SCNC: 10 MMOL/L (ref 7–15)
AST SERPL W P-5'-P-CCNC: 21 U/L (ref 0–45)
BILIRUB SERPL-MCNC: 0.6 MG/DL
BUN SERPL-MCNC: 16.2 MG/DL (ref 6–20)
CALCIUM SERPL-MCNC: 9.7 MG/DL (ref 8.8–10.4)
CHLORIDE SERPL-SCNC: 102 MMOL/L (ref 98–107)
CHOLEST SERPL-MCNC: 175 MG/DL
CREAT SERPL-MCNC: 0.85 MG/DL (ref 0.51–0.95)
EGFRCR SERPLBLD CKD-EPI 2021: 80 ML/MIN/1.73M2
ERYTHROCYTE [DISTWIDTH] IN BLOOD BY AUTOMATED COUNT: 12.6 % (ref 10–15)
FASTING STATUS PATIENT QL REPORTED: YES
FASTING STATUS PATIENT QL REPORTED: YES
GLUCOSE SERPL-MCNC: 91 MG/DL (ref 70–99)
HCO3 SERPL-SCNC: 26 MMOL/L (ref 22–29)
HCT VFR BLD AUTO: 45 % (ref 35–47)
HDLC SERPL-MCNC: 40 MG/DL
HGB BLD-MCNC: 15 G/DL (ref 11.7–15.7)
LDLC SERPL CALC-MCNC: 116 MG/DL
MCH RBC QN AUTO: 29.2 PG (ref 26.5–33)
MCHC RBC AUTO-ENTMCNC: 33.3 G/DL (ref 31.5–36.5)
MCV RBC AUTO: 87.5 FL (ref 78–100)
NONHDLC SERPL-MCNC: 135 MG/DL
PLATELET # BLD AUTO: 326 10E3/UL (ref 150–450)
POTASSIUM SERPL-SCNC: 4.4 MMOL/L (ref 3.4–5.3)
PROT SERPL-MCNC: 7.5 G/DL (ref 6.4–8.3)
RBC # BLD AUTO: 5.14 10E6/UL (ref 3.8–5.2)
SODIUM SERPL-SCNC: 138 MMOL/L (ref 135–145)
TRIGL SERPL-MCNC: 96 MG/DL
TSH SERPL DL<=0.005 MIU/L-ACNC: 2.45 UIU/ML (ref 0.3–4.2)
WBC # BLD AUTO: 10.6 10E3/UL (ref 4–11)

## 2025-09-03 PROCEDURE — 99214 OFFICE O/P EST MOD 30 MIN: CPT | Mod: 25 | Performed by: FAMILY MEDICINE

## 2025-09-03 PROCEDURE — G2211 COMPLEX E/M VISIT ADD ON: HCPCS | Performed by: FAMILY MEDICINE

## 2025-09-03 PROCEDURE — 36415 COLL VENOUS BLD VENIPUNCTURE: CPT | Performed by: FAMILY MEDICINE

## 2025-09-03 PROCEDURE — 85027 COMPLETE CBC AUTOMATED: CPT | Performed by: FAMILY MEDICINE

## 2025-09-03 PROCEDURE — 80061 LIPID PANEL: CPT | Performed by: FAMILY MEDICINE

## 2025-09-03 PROCEDURE — 1125F AMNT PAIN NOTED PAIN PRSNT: CPT | Performed by: FAMILY MEDICINE

## 2025-09-03 PROCEDURE — 3078F DIAST BP <80 MM HG: CPT | Performed by: FAMILY MEDICINE

## 2025-09-03 PROCEDURE — 99396 PREV VISIT EST AGE 40-64: CPT | Performed by: FAMILY MEDICINE

## 2025-09-03 PROCEDURE — 3074F SYST BP LT 130 MM HG: CPT | Performed by: FAMILY MEDICINE

## 2025-09-03 PROCEDURE — 84443 ASSAY THYROID STIM HORMONE: CPT | Performed by: FAMILY MEDICINE

## 2025-09-03 PROCEDURE — 80053 COMPREHEN METABOLIC PANEL: CPT | Performed by: FAMILY MEDICINE

## 2025-09-03 RX ORDER — CYCLOBENZAPRINE HCL 10 MG
10 TABLET ORAL 2 TIMES DAILY PRN
Qty: 60 TABLET | Refills: 5 | Status: SHIPPED | OUTPATIENT
Start: 2025-09-03

## 2025-09-03 RX ORDER — LOSARTAN POTASSIUM 50 MG/1
100 TABLET ORAL DAILY
Qty: 180 TABLET | Refills: 3 | Status: SHIPPED | OUTPATIENT
Start: 2025-09-03

## 2025-09-03 RX ORDER — HYDROCHLOROTHIAZIDE 25 MG/1
25 TABLET ORAL DAILY
Qty: 90 TABLET | Refills: 3 | Status: SHIPPED | OUTPATIENT
Start: 2025-09-03

## 2025-09-03 RX ORDER — ZOLPIDEM TARTRATE 5 MG/1
5 TABLET ORAL
Qty: 30 TABLET | Refills: 5 | Status: SHIPPED | OUTPATIENT
Start: 2025-09-03

## 2025-09-03 ASSESSMENT — PAIN SCALES - GENERAL: PAINLEVEL_OUTOF10: MODERATE PAIN (6)

## (undated) DEVICE — LUBRICATING JELLY 4.25OZ

## (undated) DEVICE — GLOVE PROTEXIS W/NEU-THERA 7.5  2D73TE75

## (undated) DEVICE — SYR 50ML LL W/O NDL 309653

## (undated) DEVICE — SOL NACL 0.9% INJ 1000ML BAG 07983-09

## (undated) DEVICE — SU VICRYL 4-0 FS-1 27" J441H

## (undated) DEVICE — ADH SKIN CLOSURE PREMIERPRO EXOFIN 1.0ML 3470

## (undated) DEVICE — GOWN IMPERVIOUS SPECIALTY XLG/XLONG 32474

## (undated) DEVICE — DECANTER BAG 2002S

## (undated) DEVICE — BLADE CLIPPER 4406

## (undated) DEVICE — PAD FLOOR SURGISAFE

## (undated) DEVICE — DECANTER VIAL 2006S

## (undated) DEVICE — PACK LAPAROSCOPY/PELVISCOPY STD

## (undated) DEVICE — TUBING SUCTION 12"X1/4" N612

## (undated) DEVICE — SUCTION TIP YANKAUER W/O VENT BULBOUS TIP

## (undated) DEVICE — TUBING CYSTO/BLADDER IRRIG SET 80" 06544-01

## (undated) DEVICE — PREP CHLORHEXIDINE 4% 4OZ (HIBICLENS) 57504

## (undated) DEVICE — SOL NACL 0.9% IRRIG 1000ML BOTTLE 07138-09

## (undated) DEVICE — SUCTION MANIFOLD NEPTUNE 2 SYS 1 PORT 702-025-000

## (undated) DEVICE — NDL SPINAL 18GA 3.5" 405184

## (undated) DEVICE — CATH TRAY FOLEY SURESTEP 16FR W/URINE MTR STATLK LF A303416A

## (undated) DEVICE — SOL WATER IRRIG 1000ML BOTTLE 07139-09

## (undated) DEVICE — PAD PERI INDIV WRAP 11" 2022

## (undated) RX ORDER — BUPIVACAINE HYDROCHLORIDE 5 MG/ML
INJECTION, SOLUTION EPIDURAL; INTRACAUDAL
Status: DISPENSED
Start: 2022-10-17

## (undated) RX ORDER — LIDOCAINE HYDROCHLORIDE 10 MG/ML
INJECTION, SOLUTION EPIDURAL; INFILTRATION; INTRACAUDAL; PERINEURAL
Status: DISPENSED
Start: 2022-10-17

## (undated) RX ORDER — ONDANSETRON 2 MG/ML
INJECTION INTRAMUSCULAR; INTRAVENOUS
Status: DISPENSED
Start: 2022-10-17

## (undated) RX ORDER — PROPOFOL 10 MG/ML
INJECTION, EMULSION INTRAVENOUS
Status: DISPENSED
Start: 2022-10-17

## (undated) RX ORDER — DEXAMETHASONE SODIUM PHOSPHATE 10 MG/ML
INJECTION, SOLUTION INTRAMUSCULAR; INTRAVENOUS
Status: DISPENSED
Start: 2022-10-17

## (undated) RX ORDER — ACETAMINOPHEN 325 MG/1
TABLET ORAL
Status: DISPENSED
Start: 2022-10-17

## (undated) RX ORDER — MEPERIDINE HYDROCHLORIDE 25 MG/ML
INJECTION INTRAMUSCULAR; INTRAVENOUS; SUBCUTANEOUS
Status: DISPENSED
Start: 2022-10-17

## (undated) RX ORDER — PROPOFOL 10 MG/ML
INJECTION, EMULSION INTRAVENOUS
Status: DISPENSED
Start: 2021-09-17

## (undated) RX ORDER — BUPIVACAINE HYDROCHLORIDE AND EPINEPHRINE 5; 5 MG/ML; UG/ML
INJECTION, SOLUTION EPIDURAL; INTRACAUDAL; PERINEURAL
Status: DISPENSED
Start: 2022-10-17

## (undated) RX ORDER — FENTANYL CITRATE 50 UG/ML
INJECTION, SOLUTION INTRAMUSCULAR; INTRAVENOUS
Status: DISPENSED
Start: 2022-10-17

## (undated) RX ORDER — LIDOCAINE HYDROCHLORIDE 10 MG/ML
INJECTION, SOLUTION EPIDURAL; INFILTRATION; INTRACAUDAL; PERINEURAL
Status: DISPENSED
Start: 2021-09-17

## (undated) RX ORDER — MAGNESIUM SULFATE HEPTAHYDRATE 40 MG/ML
INJECTION, SOLUTION INTRAVENOUS
Status: DISPENSED
Start: 2022-10-17

## (undated) RX ORDER — PHENAZOPYRIDINE HYDROCHLORIDE 200 MG/1
TABLET, FILM COATED ORAL
Status: DISPENSED
Start: 2022-10-17

## (undated) RX ORDER — ATROPA BELLADONNA AND OPIUM 16.2; 6 MG/1; MG/1
SUPPOSITORY RECTAL
Status: DISPENSED
Start: 2022-10-17

## (undated) RX ORDER — CEFAZOLIN SODIUM/WATER 3 G/30 ML
SYRINGE (ML) INTRAVENOUS
Status: DISPENSED
Start: 2022-10-17